# Patient Record
Sex: FEMALE | Race: WHITE | Employment: PART TIME | URBAN - METROPOLITAN AREA
[De-identification: names, ages, dates, MRNs, and addresses within clinical notes are randomized per-mention and may not be internally consistent; named-entity substitution may affect disease eponyms.]

---

## 2018-04-20 DIAGNOSIS — I10 ESSENTIAL HYPERTENSION: ICD-10-CM

## 2018-04-20 DIAGNOSIS — R07.9 CHEST PAIN, UNSPECIFIED TYPE: ICD-10-CM

## 2018-04-20 RX ORDER — VERAPAMIL HYDROCHLORIDE 120 MG/1
120 CAPSULE, EXTENDED RELEASE ORAL NIGHTLY
Qty: 90 CAPSULE | Refills: 0 | Status: SHIPPED | OUTPATIENT
Start: 2018-04-20 | End: 2018-05-23 | Stop reason: SDUPTHER

## 2018-05-14 ENCOUNTER — HOSPITAL ENCOUNTER (EMERGENCY)
Age: 48
Discharge: HOME | End: 2018-05-14
Payer: COMMERCIAL

## 2018-05-14 VITALS
WEIGHT: 225 LBS | DIASTOLIC BLOOD PRESSURE: 82 MMHG | SYSTOLIC BLOOD PRESSURE: 142 MMHG | TEMPERATURE: 97.9 F | OXYGEN SATURATION: 97 % | HEART RATE: 59 BPM | RESPIRATION RATE: 18 BRPM | BODY MASS INDEX: 30.52 KG/M2

## 2018-05-14 DIAGNOSIS — T78.40XA ALLERGIC REACTION, INITIAL ENCOUNTER: Primary | ICD-10-CM

## 2018-05-14 PROCEDURE — 96372 THER/PROPH/DIAG INJ SC/IM: CPT

## 2018-05-14 PROCEDURE — 99212 OFFICE O/P EST SF 10 MIN: CPT

## 2018-05-14 PROCEDURE — 6370000000 HC RX 637 (ALT 250 FOR IP): Performed by: NURSE PRACTITIONER

## 2018-05-14 PROCEDURE — 6360000002 HC RX W HCPCS: Performed by: NURSE PRACTITIONER

## 2018-05-14 RX ORDER — METHYLPREDNISOLONE SODIUM SUCCINATE 125 MG/2ML
125 INJECTION, POWDER, LYOPHILIZED, FOR SOLUTION INTRAMUSCULAR; INTRAVENOUS ONCE
Status: COMPLETED | OUTPATIENT
Start: 2018-05-14 | End: 2018-05-14

## 2018-05-14 RX ORDER — METHYLPREDNISOLONE 4 MG/1
TABLET ORAL
Qty: 21 TABLET | Status: SHIPPED | OUTPATIENT
Start: 2018-05-14 | End: 2018-05-20

## 2018-05-14 RX ORDER — DIPHENHYDRAMINE HCL 25 MG
25 TABLET ORAL ONCE
Status: COMPLETED | OUTPATIENT
Start: 2018-05-14 | End: 2018-05-14

## 2018-05-14 RX ORDER — FAMOTIDINE 20 MG/1
20 TABLET, FILM COATED ORAL ONCE
Status: COMPLETED | OUTPATIENT
Start: 2018-05-14 | End: 2018-05-14

## 2018-05-14 RX ADMIN — METHYLPREDNISOLONE SODIUM SUCCINATE 125 MG: 125 INJECTION, POWDER, FOR SOLUTION INTRAMUSCULAR; INTRAVENOUS at 09:17

## 2018-05-14 RX ADMIN — FAMOTIDINE 20 MG: 20 TABLET, FILM COATED ORAL at 09:17

## 2018-05-14 RX ADMIN — DIPHENHYDRAMINE HCL 25 MG: 25 TABLET ORAL at 09:16

## 2018-05-17 ENCOUNTER — CARE COORDINATION (OUTPATIENT)
Dept: CARE COORDINATION | Age: 48
End: 2018-05-17

## 2018-05-23 ENCOUNTER — OFFICE VISIT (OUTPATIENT)
Dept: FAMILY MEDICINE CLINIC | Age: 48
End: 2018-05-23
Payer: COMMERCIAL

## 2018-05-23 VITALS
DIASTOLIC BLOOD PRESSURE: 88 MMHG | OXYGEN SATURATION: 98 % | HEIGHT: 72 IN | BODY MASS INDEX: 31.71 KG/M2 | RESPIRATION RATE: 16 BRPM | TEMPERATURE: 98.2 F | SYSTOLIC BLOOD PRESSURE: 130 MMHG | HEART RATE: 55 BPM | WEIGHT: 234.12 LBS

## 2018-05-23 DIAGNOSIS — Z11.4 ENCOUNTER FOR SCREENING FOR HIV: ICD-10-CM

## 2018-05-23 DIAGNOSIS — Z00.00 HEALTH CARE MAINTENANCE: ICD-10-CM

## 2018-05-23 DIAGNOSIS — I10 HTN (HYPERTENSION), BENIGN: Primary | Chronic | ICD-10-CM

## 2018-05-23 DIAGNOSIS — I10 ESSENTIAL HYPERTENSION: ICD-10-CM

## 2018-05-23 DIAGNOSIS — T78.40XD ALLERGIC REACTION TO DRUG, SUBSEQUENT ENCOUNTER: ICD-10-CM

## 2018-05-23 PROCEDURE — 99214 OFFICE O/P EST MOD 30 MIN: CPT | Performed by: FAMILY MEDICINE

## 2018-05-23 RX ORDER — VERAPAMIL HYDROCHLORIDE 120 MG/1
120 CAPSULE, EXTENDED RELEASE ORAL NIGHTLY
Qty: 90 CAPSULE | Refills: 1 | Status: SHIPPED | OUTPATIENT
Start: 2018-07-15 | End: 2018-08-29 | Stop reason: ALTCHOICE

## 2018-05-23 RX ORDER — TRIAMTERENE AND HYDROCHLOROTHIAZIDE 37.5; 25 MG/1; MG/1
1 TABLET ORAL DAILY
Qty: 90 TABLET | Refills: 1 | Status: SHIPPED | OUTPATIENT
Start: 2018-05-23 | End: 2018-08-29 | Stop reason: SDUPTHER

## 2018-05-23 ASSESSMENT — PATIENT HEALTH QUESTIONNAIRE - PHQ9
1. LITTLE INTEREST OR PLEASURE IN DOING THINGS: 0
SUM OF ALL RESPONSES TO PHQ9 QUESTIONS 1 & 2: 0
2. FEELING DOWN, DEPRESSED OR HOPELESS: 0
SUM OF ALL RESPONSES TO PHQ QUESTIONS 1-9: 0

## 2018-07-30 DIAGNOSIS — F40.01 FEAR OF TRAVEL WITH PANIC ATTACKS: Primary | ICD-10-CM

## 2018-07-30 RX ORDER — HYDROXYZINE 50 MG/1
50 TABLET, FILM COATED ORAL DAILY PRN
Qty: 5 TABLET | Refills: 0 | Status: SHIPPED | OUTPATIENT
Start: 2018-07-30 | End: 2018-08-04

## 2018-08-22 ENCOUNTER — NURSE ONLY (OUTPATIENT)
Dept: FAMILY MEDICINE CLINIC | Age: 48
End: 2018-08-22
Payer: COMMERCIAL

## 2018-08-22 ENCOUNTER — HOSPITAL ENCOUNTER (OUTPATIENT)
Age: 48
Discharge: HOME | End: 2018-08-24
Payer: COMMERCIAL

## 2018-08-22 DIAGNOSIS — I10 HTN (HYPERTENSION), BENIGN: Chronic | ICD-10-CM

## 2018-08-22 DIAGNOSIS — Z11.4 ENCOUNTER FOR SCREENING FOR HIV: ICD-10-CM

## 2018-08-22 DIAGNOSIS — Z00.00 HEALTH CARE MAINTENANCE: ICD-10-CM

## 2018-08-22 LAB
ALBUMIN SERPL-MCNC: 4.2 G/DL (ref 3.5–5.2)
ALP BLD-CCNC: 49 U/L (ref 35–104)
ALT SERPL-CCNC: 17 U/L (ref 0–32)
ANION GAP SERPL CALCULATED.3IONS-SCNC: 12 MMOL/L (ref 7–16)
AST SERPL-CCNC: 20 U/L (ref 0–31)
BILIRUB SERPL-MCNC: 0.4 MG/DL (ref 0–1.2)
BUN BLDV-MCNC: 17 MG/DL (ref 6–20)
CALCIUM SERPL-MCNC: 9.4 MG/DL (ref 8.6–10.2)
CHLORIDE BLD-SCNC: 101 MMOL/L (ref 98–107)
CHOLESTEROL, TOTAL: 203 MG/DL (ref 0–199)
CO2: 28 MMOL/L (ref 22–29)
CREAT SERPL-MCNC: 1.1 MG/DL (ref 0.5–1)
GFR AFRICAN AMERICAN: >60
GFR NON-AFRICAN AMERICAN: 53 ML/MIN/1.73
GLUCOSE BLD-MCNC: 99 MG/DL (ref 74–109)
HDLC SERPL-MCNC: 47 MG/DL
LDL CHOLESTEROL CALCULATED: 122 MG/DL (ref 0–99)
MAGNESIUM: 2.3 MG/DL (ref 1.6–2.6)
POTASSIUM SERPL-SCNC: 4 MMOL/L (ref 3.5–5)
SODIUM BLD-SCNC: 141 MMOL/L (ref 132–146)
TOTAL PROTEIN: 7.1 G/DL (ref 6.4–8.3)
TRIGL SERPL-MCNC: 171 MG/DL (ref 0–149)
TSH SERPL DL<=0.05 MIU/L-ACNC: 1.45 UIU/ML (ref 0.27–4.2)
VLDLC SERPL CALC-MCNC: 34 MG/DL

## 2018-08-22 PROCEDURE — 86703 HIV-1/HIV-2 1 RESULT ANTBDY: CPT

## 2018-08-22 PROCEDURE — 36415 COLL VENOUS BLD VENIPUNCTURE: CPT | Performed by: FAMILY MEDICINE

## 2018-08-22 PROCEDURE — 80061 LIPID PANEL: CPT

## 2018-08-22 PROCEDURE — 80053 COMPREHEN METABOLIC PANEL: CPT

## 2018-08-22 PROCEDURE — 83735 ASSAY OF MAGNESIUM: CPT

## 2018-08-22 PROCEDURE — 84443 ASSAY THYROID STIM HORMONE: CPT

## 2018-08-23 LAB — HIV-1 AND HIV-2 ANTIBODIES: NORMAL

## 2018-08-29 ENCOUNTER — OFFICE VISIT (OUTPATIENT)
Dept: FAMILY MEDICINE CLINIC | Age: 48
End: 2018-08-29
Payer: COMMERCIAL

## 2018-08-29 VITALS
WEIGHT: 233.5 LBS | BODY MASS INDEX: 31.63 KG/M2 | DIASTOLIC BLOOD PRESSURE: 80 MMHG | SYSTOLIC BLOOD PRESSURE: 130 MMHG | HEART RATE: 58 BPM | TEMPERATURE: 97.7 F | HEIGHT: 72 IN | OXYGEN SATURATION: 98 % | RESPIRATION RATE: 12 BRPM

## 2018-08-29 DIAGNOSIS — Z12.11 COLON CANCER SCREENING: ICD-10-CM

## 2018-08-29 DIAGNOSIS — I10 ESSENTIAL HYPERTENSION: Primary | ICD-10-CM

## 2018-08-29 DIAGNOSIS — Z12.39 BREAST CANCER SCREENING: ICD-10-CM

## 2018-08-29 DIAGNOSIS — Z00.00 HEALTH CARE MAINTENANCE: ICD-10-CM

## 2018-08-29 PROCEDURE — 99214 OFFICE O/P EST MOD 30 MIN: CPT | Performed by: FAMILY MEDICINE

## 2018-08-29 RX ORDER — TRIAMTERENE AND HYDROCHLOROTHIAZIDE 37.5; 25 MG/1; MG/1
1 TABLET ORAL DAILY
Qty: 90 TABLET | Refills: 1 | Status: SHIPPED | OUTPATIENT
Start: 2018-08-29 | End: 2018-10-17 | Stop reason: SDUPTHER

## 2018-08-29 RX ORDER — VERAPAMIL HYDROCHLORIDE 120 MG/1
120 CAPSULE, EXTENDED RELEASE ORAL NIGHTLY
Qty: 90 CAPSULE | Refills: 1 | Status: CANCELLED | OUTPATIENT
Start: 2018-08-29 | End: 2019-03-01

## 2018-08-29 RX ORDER — METOPROLOL SUCCINATE 50 MG/1
50 TABLET, EXTENDED RELEASE ORAL DAILY
Qty: 90 TABLET | Refills: 1 | Status: SHIPPED | OUTPATIENT
Start: 2018-08-29 | End: 2018-10-17 | Stop reason: SDUPTHER

## 2018-08-29 ASSESSMENT — ENCOUNTER SYMPTOMS
BLURRED VISION: 0
BLOOD IN STOOL: 0
DIARRHEA: 0
BACK PAIN: 0
DOUBLE VISION: 0
WHEEZING: 0
CONSTIPATION: 0
NAUSEA: 0
COUGH: 0
SORE THROAT: 0
HEARTBURN: 0
SHORTNESS OF BREATH: 0
ORTHOPNEA: 0
ABDOMINAL PAIN: 0
VOMITING: 0
SPUTUM PRODUCTION: 0

## 2018-08-29 NOTE — PROGRESS NOTES
Alexander Pereira is a 53 y.o. female who presents today for check up. She has a history of HTN. Hypertension:  Patient is here for follow up chronic hypertension. This is not generally controlled on current medication regimen (currently on Maxzide 25 1 1/2 tablets/day and ER verapamil 120 mg/day). BP today is 130/80. Takes meds as directed and tolerates them well. Most recent labs reviewed with patient and are remarkable for GFR 53 and mildly elevated lipid profile. Symptoms from htn standpoint per ROS include lower extremity edema, palpitations. Patient is compliant with lifestyle modifications. She started Weight Watchers within the past 4 weeks and is eating less processed, less salty foods and more fruits and vegetables. Weight is stable. Patient does not smoke. Comorbid conditions include stress/anxiety without medication treatment. Health Maintenance:  Alexander Pereira is due for breast cancer and colon cancer screening. No PMH/FH of breast or female reproductive malignancies. Her mother had precancerous colon polyps. [address]:  Patient's past medical, surgical, social and/or family history reviewed, updated in chart, and are non-contributory (unless otherwise stated). Medications and allergies also reviewed and updated in chart. Review of Systems  Review of Systems   Constitutional: Negative for malaise/fatigue and weight loss. HENT: Negative for congestion, ear pain and sore throat. Eyes: Negative for blurred vision and double vision. Respiratory: Negative for cough, sputum production, shortness of breath and wheezing. Cardiovascular: Negative for chest pain, palpitations, orthopnea and leg swelling. Gastrointestinal: Negative for abdominal pain, blood in stool, constipation, diarrhea, heartburn, nausea and vomiting. Genitourinary: Negative for dysuria, frequency, hematuria and urgency.    Musculoskeletal: Negative for back pain, joint pain, myalgias and neck Pop Lindsey MD  -     [facility] DIGITAL SCREEN W CAD BILATERAL; Future    Breast cancer screening  -     [facility] DIGITAL SCREEN W CAD BILATERAL; Future    Colon cancer screening  -     Pop Lindsey MD         Call or go to ED immediately if symptoms worsen or persist.    Follow Up:  Return 1) F/U 6 weeks to assess BP on new medication regimen, for F/U 3 mos check up; fasting labs 1 week prior. or sooner if necessary. Educational materials and/or home exercises printed for patient's review and were included in patient instructions on his/her After Visit Summary and given to patient at the end of visit. Counseled regarding above diagnosis, including possible risks and complications,  especially if left uncontrolled. Counseled regarding the possible side effects, risks, benefits and alternatives to treatment; patient and/or guardian verbalizes understanding, agrees, feels comfortable with and wishes to proceed with above treatment plan. Advised patient to call with any new medication issues, and read all Rx info from pharmacy to assure aware of all possible risks and side effects of medication before taking. Reviewed age and gender appropriate health screening exams and vaccinations. Advised patient regarding importance of keeping up with recommended health maintenance and to schedule as soon as possible if overdue, as this is important in assessing for undiagnosed pathology, especially cancer, as well as protecting against potentially harmful/life threatening disease. Patient and/or guardian verbalizes understanding and agrees with above counseling, assessment and plan. All questions answered.     Mckenzie León MD

## 2018-08-30 ENCOUNTER — TELEPHONE (OUTPATIENT)
Dept: SURGERY | Age: 48
End: 2018-08-30

## 2018-08-30 NOTE — TELEPHONE ENCOUNTER
First attempt, Left message to schedule pt with any available provider at [place] office for a colonoscopy screening consult.     Electronically signed by Priya Cordero CMA on 8/30/18 at 10:54 AM

## 2018-09-04 ENCOUNTER — TELEPHONE (OUTPATIENT)
Dept: SURGERY | Age: 48
End: 2018-09-04

## 2018-09-10 ENCOUNTER — HOSPITAL ENCOUNTER (OUTPATIENT)
Dept: GENERAL RADIOLOGY | Age: 48
Discharge: HOME | End: 2018-09-12
Payer: COMMERCIAL

## 2018-09-10 DIAGNOSIS — Z12.39 BREAST CANCER SCREENING: ICD-10-CM

## 2018-09-10 DIAGNOSIS — Z00.00 HEALTH CARE MAINTENANCE: ICD-10-CM

## 2018-09-10 DIAGNOSIS — N64.4 BREAST PAIN, LEFT: ICD-10-CM

## 2018-09-10 PROCEDURE — 76642 ULTRASOUND BREAST LIMITED: CPT

## 2018-09-10 PROCEDURE — G0279 TOMOSYNTHESIS, MAMMO: HCPCS

## 2018-09-28 PROBLEM — Z00.00 HEALTH CARE MAINTENANCE: Status: RESOLVED | Noted: 2018-08-29 | Resolved: 2018-09-28

## 2018-10-09 ENCOUNTER — OFFICE VISIT (OUTPATIENT)
Dept: SURGERY | Age: 48
End: 2018-10-09
Payer: COMMERCIAL

## 2018-10-09 VITALS
BODY MASS INDEX: 31.97 KG/M2 | OXYGEN SATURATION: 97 % | WEIGHT: 236 LBS | HEART RATE: 55 BPM | HEIGHT: 72 IN | DIASTOLIC BLOOD PRESSURE: 85 MMHG | SYSTOLIC BLOOD PRESSURE: 133 MMHG | RESPIRATION RATE: 17 BRPM

## 2018-10-09 DIAGNOSIS — R10.31 RIGHT LOWER QUADRANT ABDOMINAL PAIN: Primary | ICD-10-CM

## 2018-10-09 PROCEDURE — 99999 PR OFFICE/OUTPT VISIT,PROCEDURE ONLY: CPT | Performed by: SURGERY

## 2018-10-09 PROCEDURE — 99202 OFFICE O/P NEW SF 15 MIN: CPT | Performed by: SURGERY

## 2018-10-09 RX ORDER — POLYETHYLENE GLYCOL 3350 17 G/17G
500 POWDER, FOR SOLUTION ORAL ONCE
Qty: 500 G | Refills: 1 | Status: SHIPPED | OUTPATIENT
Start: 2018-10-09 | End: 2018-10-09

## 2018-10-09 ASSESSMENT — ENCOUNTER SYMPTOMS
EYES NEGATIVE: 1
RESPIRATORY NEGATIVE: 1
HEARTBURN: 0
NAUSEA: 0
BLOOD IN STOOL: 0
CONSTIPATION: 0
VOMITING: 0
ABDOMINAL PAIN: 1
DIARRHEA: 0

## 2018-10-15 NOTE — PROGRESS NOTES
SOFI Valles called and spoke to Sigifredo pulido and scheduled PT for colonoscopy on 12/03/18 @ 7:30am with Dr. Cliff Farias. PT denied taking any ASA products. PT verbalized she understood prep instructions, and NPO after midnight. PT verbalized that she understood appointment date/time, as well as to arrive 1 hour prior to procedure. PT advised on where to park and enter the hospital at for procedure. PT has been told to make sure they have a ride to and from procedure as they are not allowed to drive after procedure. PT given procedure letter in office or mailed to them with all instructions also on it. MA instructed PT to call the office at [phone] with any questions, comments, or concerns about the procedure.        Electronically signed by Sheldon Davis MA on 10/15/18 at 10:36 AM

## 2018-10-17 ENCOUNTER — TELEPHONE (OUTPATIENT)
Dept: SURGERY | Age: 48
End: 2018-10-17

## 2018-10-17 ENCOUNTER — OFFICE VISIT (OUTPATIENT)
Dept: FAMILY MEDICINE CLINIC | Age: 48
End: 2018-10-17
Payer: COMMERCIAL

## 2018-10-17 VITALS
HEIGHT: 72 IN | DIASTOLIC BLOOD PRESSURE: 62 MMHG | SYSTOLIC BLOOD PRESSURE: 114 MMHG | OXYGEN SATURATION: 99 % | HEART RATE: 53 BPM | WEIGHT: 236 LBS | TEMPERATURE: 98.5 F | BODY MASS INDEX: 31.97 KG/M2

## 2018-10-17 DIAGNOSIS — I10 ESSENTIAL HYPERTENSION: Primary | ICD-10-CM

## 2018-10-17 DIAGNOSIS — Z00.00 HEALTH CARE MAINTENANCE: ICD-10-CM

## 2018-10-17 DIAGNOSIS — Z23 NEED FOR INFLUENZA VACCINATION: ICD-10-CM

## 2018-10-17 PROCEDURE — 99213 OFFICE O/P EST LOW 20 MIN: CPT | Performed by: FAMILY MEDICINE

## 2018-10-17 PROCEDURE — 90471 IMMUNIZATION ADMIN: CPT | Performed by: FAMILY MEDICINE

## 2018-10-17 PROCEDURE — 90686 IIV4 VACC NO PRSV 0.5 ML IM: CPT | Performed by: FAMILY MEDICINE

## 2018-10-17 RX ORDER — TRIAMTERENE AND HYDROCHLOROTHIAZIDE 37.5; 25 MG/1; MG/1
1 TABLET ORAL DAILY
Qty: 90 TABLET | Refills: 1 | Status: SHIPPED | OUTPATIENT
Start: 2018-10-17 | End: 2018-11-12 | Stop reason: SDUPTHER

## 2018-10-17 RX ORDER — METOPROLOL SUCCINATE 50 MG/1
50 TABLET, EXTENDED RELEASE ORAL DAILY
Qty: 90 TABLET | Refills: 1 | Status: SHIPPED | OUTPATIENT
Start: 2018-10-17 | End: 2018-11-28 | Stop reason: SDUPTHER

## 2018-10-17 ASSESSMENT — ENCOUNTER SYMPTOMS
SORE THROAT: 0
DIARRHEA: 0
DOUBLE VISION: 0
ORTHOPNEA: 0
ABDOMINAL PAIN: 0
NAUSEA: 0
HEARTBURN: 0
BLOOD IN STOOL: 0
BLURRED VISION: 0
COUGH: 0
BACK PAIN: 0
CONSTIPATION: 0
SPUTUM PRODUCTION: 0
VOMITING: 0
SHORTNESS OF BREATH: 0
WHEEZING: 0

## 2018-10-17 NOTE — PROGRESS NOTES
pain, joint pain, myalgias and neck pain. Skin: Negative for itching and rash. Neurological: Negative for dizziness, tingling, focal weakness, weakness and headaches. Psychiatric/Behavioral: Negative for depression, memory loss and substance abuse. The patient is not nervous/anxious and does not have insomnia. Physical Exam:    VS:  /62   Pulse 53   Temp 98.5 °F (36.9 °C) (Oral)   Ht 6' (1.829 m)   Wt 236 lb (107 kg)   SpO2 99%   Breastfeeding? No   BMI 32.01 kg/m²   LAST WEIGHT:  Wt Readings from Last 3 Encounters:   10/17/18 236 lb (107 kg)   10/09/18 236 lb (107 kg)   08/29/18 233 lb 8 oz (105.9 kg)     Physical Exam   Constitutional: She is oriented to person, place, and time. She appears well-developed and well-nourished. No distress. HENT:   Head: Normocephalic and atraumatic. Right Ear: External ear normal.   Left Ear: External ear normal.   Mouth/Throat: Oropharynx is clear and moist. No oropharyngeal exudate. Eyes: Pupils are equal, round, and reactive to light. Conjunctivae and EOM are normal. Right eye exhibits no discharge. No scleral icterus. Neck: Normal range of motion. Neck supple. No thyromegaly present. Cardiovascular: Normal rate, regular rhythm, normal heart sounds and intact distal pulses. No murmur heard. Pulmonary/Chest: Effort normal. No stridor. No respiratory distress. She has no wheezes. She has no rales. She exhibits no tenderness. Abdominal: Soft. Bowel sounds are normal. She exhibits no distension and no mass. There is no tenderness. There is no guarding. Musculoskeletal: Normal range of motion. She exhibits no edema or tenderness. Lymphadenopathy:     She has no cervical adenopathy. Neurological: She is alert and oriented to person, place, and time. Skin: Skin is warm and dry. No rash noted. She is not diaphoretic. No erythema. No pallor. Psychiatric: She has a normal mood and affect.  Her behavior is normal. Thought content normal. patient's review and were included in patient instructions on his/her After Visit Summary and given to patient at the end of visit. Counseled regarding above diagnosis, including possible risks and complications,  especially if left uncontrolled. Counseled regarding the possible side effects, risks, benefits and alternatives to treatment; patient and/or guardian verbalizes understanding, agrees, feels comfortable with and wishes to proceed with above treatment plan. Advised patient to call with any new medication issues, and read all Rx info from pharmacy to assure aware of all possible risks and side effects of medication before taking. Reviewed age and gender appropriate health screening exams and vaccinations. Advised patient regarding importance of keeping up with recommended health maintenance and to schedule as soon as possible if overdue, as this is important in assessing for undiagnosed pathology, especially cancer, as well as protecting against potentially harmful/life threatening disease. Patient and/or guardian verbalizes understanding and agrees with above counseling, assessment and plan. All questions answered.     Hari Scruggs MD

## 2018-11-12 DIAGNOSIS — I10 ESSENTIAL HYPERTENSION: ICD-10-CM

## 2018-11-12 RX ORDER — TRIAMTERENE AND HYDROCHLOROTHIAZIDE 37.5; 25 MG/1; MG/1
1 TABLET ORAL DAILY
Qty: 90 TABLET | Refills: 1 | Status: SHIPPED | OUTPATIENT
Start: 2018-11-12 | End: 2018-11-28 | Stop reason: SDUPTHER

## 2018-11-19 ENCOUNTER — NURSE ONLY (OUTPATIENT)
Dept: FAMILY MEDICINE CLINIC | Age: 48
End: 2018-11-19
Payer: COMMERCIAL

## 2018-11-19 ENCOUNTER — HOSPITAL ENCOUNTER (OUTPATIENT)
Age: 48
Discharge: HOME | End: 2018-11-21
Payer: COMMERCIAL

## 2018-11-19 DIAGNOSIS — I10 HTN (HYPERTENSION), BENIGN: Chronic | ICD-10-CM

## 2018-11-19 DIAGNOSIS — I10 ESSENTIAL HYPERTENSION: ICD-10-CM

## 2018-11-19 LAB
ALBUMIN SERPL-MCNC: 4.2 G/DL (ref 3.5–5.2)
ALP BLD-CCNC: 46 U/L (ref 35–104)
ALT SERPL-CCNC: 17 U/L (ref 0–32)
ANION GAP SERPL CALCULATED.3IONS-SCNC: 13 MMOL/L (ref 7–16)
AST SERPL-CCNC: 20 U/L (ref 0–31)
BASOPHILS ABSOLUTE: 0.06 E9/L (ref 0–0.2)
BASOPHILS RELATIVE PERCENT: 0.9 % (ref 0–2)
BILIRUB SERPL-MCNC: 0.5 MG/DL (ref 0–1.2)
BUN BLDV-MCNC: 15 MG/DL (ref 6–20)
CALCIUM SERPL-MCNC: 9.6 MG/DL (ref 8.6–10.2)
CHLORIDE BLD-SCNC: 103 MMOL/L (ref 98–107)
CHOLESTEROL, TOTAL: 198 MG/DL (ref 0–199)
CO2: 28 MMOL/L (ref 22–29)
CREAT SERPL-MCNC: 1.2 MG/DL (ref 0.5–1)
EOSINOPHILS ABSOLUTE: 0.23 E9/L (ref 0.05–0.5)
EOSINOPHILS RELATIVE PERCENT: 3.5 % (ref 0–6)
GFR AFRICAN AMERICAN: 58
GFR NON-AFRICAN AMERICAN: 48 ML/MIN/1.73
GLUCOSE BLD-MCNC: 103 MG/DL (ref 74–99)
HCT VFR BLD CALC: 42.8 % (ref 34–48)
HDLC SERPL-MCNC: 44 MG/DL
HEMOGLOBIN: 14.3 G/DL (ref 11.5–15.5)
IMMATURE GRANULOCYTES #: 0.02 E9/L
IMMATURE GRANULOCYTES %: 0.3 % (ref 0–5)
LDL CHOLESTEROL CALCULATED: 110 MG/DL (ref 0–99)
LYMPHOCYTES ABSOLUTE: 2.91 E9/L (ref 1.5–4)
LYMPHOCYTES RELATIVE PERCENT: 44 % (ref 20–42)
MAGNESIUM: 2.1 MG/DL (ref 1.6–2.6)
MCH RBC QN AUTO: 30.2 PG (ref 26–35)
MCHC RBC AUTO-ENTMCNC: 33.4 % (ref 32–34.5)
MCV RBC AUTO: 90.3 FL (ref 80–99.9)
MONOCYTES ABSOLUTE: 0.46 E9/L (ref 0.1–0.95)
MONOCYTES RELATIVE PERCENT: 6.9 % (ref 2–12)
NEUTROPHILS ABSOLUTE: 2.94 E9/L (ref 1.8–7.3)
NEUTROPHILS RELATIVE PERCENT: 44.4 % (ref 43–80)
PDW BLD-RTO: 12.1 FL (ref 11.5–15)
PLATELET # BLD: 298 E9/L (ref 130–450)
PMV BLD AUTO: 10.9 FL (ref 7–12)
POTASSIUM SERPL-SCNC: 4.3 MMOL/L (ref 3.5–5)
RBC # BLD: 4.74 E12/L (ref 3.5–5.5)
SODIUM BLD-SCNC: 144 MMOL/L (ref 132–146)
TOTAL PROTEIN: 6.9 G/DL (ref 6.4–8.3)
TRIGL SERPL-MCNC: 220 MG/DL (ref 0–149)
VLDLC SERPL CALC-MCNC: 44 MG/DL
WBC # BLD: 6.6 E9/L (ref 4.5–11.5)

## 2018-11-19 PROCEDURE — 80053 COMPREHEN METABOLIC PANEL: CPT

## 2018-11-19 PROCEDURE — 36415 COLL VENOUS BLD VENIPUNCTURE: CPT | Performed by: FAMILY MEDICINE

## 2018-11-19 PROCEDURE — 80061 LIPID PANEL: CPT

## 2018-11-19 PROCEDURE — 85025 COMPLETE CBC W/AUTO DIFF WBC: CPT

## 2018-11-19 PROCEDURE — 83735 ASSAY OF MAGNESIUM: CPT

## 2018-11-28 ENCOUNTER — OFFICE VISIT (OUTPATIENT)
Dept: FAMILY MEDICINE CLINIC | Age: 48
End: 2018-11-28
Payer: COMMERCIAL

## 2018-11-28 VITALS
OXYGEN SATURATION: 97 % | DIASTOLIC BLOOD PRESSURE: 86 MMHG | TEMPERATURE: 97.8 F | BODY MASS INDEX: 32.48 KG/M2 | SYSTOLIC BLOOD PRESSURE: 122 MMHG | HEIGHT: 72 IN | WEIGHT: 239.8 LBS | RESPIRATION RATE: 16 BRPM | HEART RATE: 51 BPM

## 2018-11-28 DIAGNOSIS — F32.A ANXIETY AND DEPRESSION: ICD-10-CM

## 2018-11-28 DIAGNOSIS — I10 ESSENTIAL HYPERTENSION: Primary | ICD-10-CM

## 2018-11-28 DIAGNOSIS — F41.9 ANXIETY AND DEPRESSION: ICD-10-CM

## 2018-11-28 PROCEDURE — G0444 DEPRESSION SCREEN ANNUAL: HCPCS | Performed by: FAMILY MEDICINE

## 2018-11-28 PROCEDURE — 99215 OFFICE O/P EST HI 40 MIN: CPT | Performed by: FAMILY MEDICINE

## 2018-11-28 RX ORDER — TRIAMTERENE AND HYDROCHLOROTHIAZIDE 37.5; 25 MG/1; MG/1
1 TABLET ORAL DAILY
Qty: 90 TABLET | Refills: 1
Start: 2018-11-28 | End: 2019-05-29 | Stop reason: SDUPTHER

## 2018-11-28 RX ORDER — METOPROLOL SUCCINATE 50 MG/1
50 TABLET, EXTENDED RELEASE ORAL DAILY
Qty: 90 TABLET | Refills: 1
Start: 2018-11-28 | End: 2019-02-27 | Stop reason: SDUPTHER

## 2018-11-28 RX ORDER — ESCITALOPRAM OXALATE 20 MG/1
20 TABLET ORAL DAILY
Qty: 90 TABLET | Refills: 1 | Status: SHIPPED | OUTPATIENT
Start: 2018-11-28 | End: 2019-03-05

## 2018-11-28 ASSESSMENT — ENCOUNTER SYMPTOMS
VOMITING: 0
NAUSEA: 0
DIARRHEA: 0
HEARTBURN: 0
SPUTUM PRODUCTION: 0
WHEEZING: 0
ABDOMINAL PAIN: 0
DOUBLE VISION: 0
BLOOD IN STOOL: 0
BACK PAIN: 0
CONSTIPATION: 0
BLURRED VISION: 0
SHORTNESS OF BREATH: 0
SORE THROAT: 0
COUGH: 0
ORTHOPNEA: 0

## 2018-11-28 ASSESSMENT — PATIENT HEALTH QUESTIONNAIRE - PHQ9
1. LITTLE INTEREST OR PLEASURE IN DOING THINGS: 3
4. FEELING TIRED OR HAVING LITTLE ENERGY: 2
9. THOUGHTS THAT YOU WOULD BE BETTER OFF DEAD, OR OF HURTING YOURSELF: 0
SUM OF ALL RESPONSES TO PHQ QUESTIONS 1-9: 12
5. POOR APPETITE OR OVEREATING: 0
10. IF YOU CHECKED OFF ANY PROBLEMS, HOW DIFFICULT HAVE THESE PROBLEMS MADE IT FOR YOU TO DO YOUR WORK, TAKE CARE OF THINGS AT HOME, OR GET ALONG WITH OTHER PEOPLE: 1
3. TROUBLE FALLING OR STAYING ASLEEP: 1
8. MOVING OR SPEAKING SO SLOWLY THAT OTHER PEOPLE COULD HAVE NOTICED. OR THE OPPOSITE, BEING SO FIGETY OR RESTLESS THAT YOU HAVE BEEN MOVING AROUND A LOT MORE THAN USUAL: 1
SUM OF ALL RESPONSES TO PHQ QUESTIONS 1-9: 12
SUM OF ALL RESPONSES TO PHQ9 QUESTIONS 1 & 2: 4
2. FEELING DOWN, DEPRESSED OR HOPELESS: 1
7. TROUBLE CONCENTRATING ON THINGS, SUCH AS READING THE NEWSPAPER OR WATCHING TELEVISION: 2
6. FEELING BAD ABOUT YOURSELF - OR THAT YOU ARE A FAILURE OR HAVE LET YOURSELF OR YOUR FAMILY DOWN: 2

## 2018-11-28 NOTE — PATIENT INSTRUCTIONS
Patient Education        Anxiety Disorder: Care Instructions  Your Care Instructions    Anxiety is a normal reaction to stress. Difficult situations can cause you to have symptoms such as sweaty palms and a nervous feeling. In an anxiety disorder, the symptoms are far more severe. Constant worry, muscle tension, trouble sleeping, nausea and diarrhea, and other symptoms can make normal daily activities difficult or impossible. These symptoms may occur for no reason, and they can affect your work, school, or social life. Medicines, counseling, and self-care can all help. Follow-up care is a key part of your treatment and safety. Be sure to make and go to all appointments, and call your doctor if you are having problems. It's also a good idea to know your test results and keep a list of the medicines you take. How can you care for yourself at home? · Take medicines exactly as directed. Call your doctor if you think you are having a problem with your medicine. · Go to your counseling sessions and follow-up appointments. · Recognize and accept your anxiety. Then, when you are in a situation that makes you anxious, say to yourself, \"This is not an emergency. I feel uncomfortable, but I am not in danger. I can keep going even if I feel anxious. \"  · Be kind to your body:  ? Relieve tension with exercise or a massage. ? Get enough rest.  ? Avoid alcohol, caffeine, nicotine, and illegal drugs. They can increase your anxiety level and cause sleep problems. ? Learn and do relaxation techniques. See below for more about these techniques. · Engage your mind. Get out and do something you enjoy. Go to a funny movie, or take a walk or hike. Plan your day. Having too much or too little to do can make you anxious. · Keep a record of your symptoms. Discuss your fears with a good friend or family member, or join a support group for people with similar problems. Talking to others sometimes relieves stress.   · Get involved in play a relaxation tape while you drive a car. When should you call for help? Call 911 anytime you think you may need emergency care. For example, call if:    · You feel you cannot stop from hurting yourself or someone else.   Gertrudis Enamorado the numbers for these national suicide hotlines: 5-338-325-TALK (4-278-300-958.641.1610) and 7-267-ZZALALI (9-238.676.8427). If you or someone you know talks about suicide or feeling hopeless, get help right away.   Watch closely for changes in your health, and be sure to contact your doctor if:    · You have anxiety or fear that affects your life.     · You have symptoms of anxiety that are new or different from those you had before. Where can you learn more? Go to https://[email or web address].[email or web address]. org and sign in to your [email or web address] account. Enter P754 in the [employer] box to learn more about \"Anxiety Disorder: Care Instructions. \"     If you do not have an account, please click on the \"Sign Up Now\" link. Current as of: December 7, 2017  Content Version: 11.8  © 2900-7557 Healthwise, [employer]. Care instructions adapted under license by [facility] ([facility]). If you have questions about a medical condition or this instruction, always ask your healthcare professional. [place] [address] any warranty or liability for your use of this information. Patient Education        Learning About Anxiety Disorders  What are anxiety disorders? Anxiety disorders are a type of medical problem. They cause severe anxiety. When you feel anxious, you feel that something bad is about to happen. This feeling interferes with your life. These disorders include:  · Generalized anxiety disorder. You feel worried and stressed about many everyday events and activities. This goes on for several months and disrupts your life on most days. · Panic disorder. You have repeated panic attacks. A panic attack is a sudden, intense fear or anxiety. It may make you feel short of breath.  Your heart may pound. · Social anxiety disorder. You feel very anxious about what you will say or do in front of people. For example, you may be scared to talk or eat in public. This problem affects your daily life. · Phobias. You are very scared of a specific object, situation, or activity. For example, you may fear spiders, high places, or small spaces. What are the symptoms? Generalized anxiety disorder  Symptoms may include:  · Feeling worried and stressed about many things almost every day. · Feeling tired or irritable. You may have a hard time concentrating. · Having headaches or muscle aches. · Having a hard time getting to sleep or staying asleep. Panic disorder  You may have repeated panic attacks when there is no reason for feeling afraid. You may change your daily activities because you worry that you will have another attack. Symptoms may include:  · Intense fear, terror, or anxiety. · Trouble breathing or very fast breathing. · Chest pain or tightness. · A heartbeat that races or is not regular. Social anxiety disorder  Symptoms may include:  · Fear about a social situation, such as eating in front of others or speaking in public. You may worry a lot. Or you may be afraid that something bad will happen. · Anxiety that can cause you to blush, sweat, and feel shaky. · A heartbeat that is faster than normal.  · A hard time focusing. Phobias  Symptoms may include:  · More fear than most people of being around an object, being in a situation, or doing an activity. You might also be stressed about the chance of being around the thing you fear. · Worry about losing control, panicking, fainting, or having physical symptoms like a faster heartbeat when you are around the situation or object. How are these disorders treated? Anxiety disorders can be treated with medicines or counseling. A combination of both may be used. Medicines may include:  · Antidepressants.  These may help your symptoms by does not mean that you are going crazy. It is important to know that depression can be treated. Medicines, counseling, and self-care can all help. Many people do not get help because they are embarrassed or think that they will get over the depression on their own. But some people do not get better without treatment. Follow-up care is a key part of your treatment and safety. Be sure to make and go to all appointments, and call your doctor if you are having problems. It's also a good idea to know your test results and keep a list of the medicines you take. How can you care for yourself at home? Learn about antidepressant medicines  Antidepressant medicines can improve or end the symptoms of depression. You may need to take the medicine for at least 6 months, and often longer. Keep taking your medicine even if you feel better. If you stop taking it too soon, your symptoms may come back or get worse. You may start to feel better within 1 to 3 weeks of taking antidepressant medicine. But it can take as many as 6 to 8 weeks to see more improvement. Talk to your doctor if you have problems with your medicine or if you do not notice any improvement after 3 weeks. Antidepressants can make you feel tired, dizzy, or nervous. Some people have dry mouth, constipation, headaches, sexual problems, an upset stomach, or diarrhea. Many of these side effects are mild and go away on their own after you take the medicine for a few weeks. Some may last longer. Talk to your doctor if side effects bother you too much. You might be able to try a different medicine. If you are pregnant or breastfeeding, talk to your doctor about what medicines you can take. Learn about counseling  In many cases, counseling can work as well as medicines to treat mild to moderate depression. Counseling is done by licensed mental health providers, such as psychologists, social workers, and some types of nurses.  It can be done in one-on-one sessions or

## 2018-11-28 NOTE — PROGRESS NOTES
Component Value Date    LDLCALC 110 (H) 11/19/2018    LDLCALC 122 (H) 08/22/2018    LDLCALC 102 (H) 11/03/2015       No results found for: LABA1C  Lab Results   Component Value Date    LDLCALC 110 (H) 11/19/2018    CREATININE 1.2 (H) 11/19/2018         Assessment / Plan:      Giselle Hernandez was seen today for hypertension and atrial flutter. Diagnoses and all orders for this visit:    Essential hypertension:  Well controlled  -     triamterene-hydrochlorothiazide (MAXZIDE-25) 37.5-25 MG per tablet; Take 1 tablet by mouth daily  -     metoprolol succinate (TOPROL XL) 50 MG extended release tablet; Take 1 tablet by mouth daily    Anxiety and depression:  New diagnosis  -     escitalopram (LEXAPRO) 20 MG tablet; Take 1 tablet by mouth daily        -     Written/video information regarding anxiety and depression emailed to patient via [address] She was encouraged to start walking daily. Call or go to ED immediately if symptoms worsen or persist.    Follow Up:  Return for F/U 6-7 weeks to assess response of symptoms to medication and non medication rx. or sooner if necessary. Educational materials and/or home exercises printed for patient's review and were included in patient instructions on his/her After Visit Summary and given to patient at the end of visit. Counseled regarding above diagnosis, including possible risks and complications,  especially if left uncontrolled. Counseled regarding the possible side effects, risks, benefits and alternatives to treatment; patient and/or guardian verbalizes understanding, agrees, feels comfortable with and wishes to proceed with above treatment plan. Advised patient to call with any new medication issues, and read all Rx info from pharmacy to assure aware of all possible risks and side effects of medication before taking. Reviewed age and gender appropriate health screening exams and vaccinations.   Advised patient regarding importance of keeping up

## 2018-11-28 NOTE — PROGRESS NOTES
remainder of the day due to having had anesthesia. PARKING INSTRUCTIONS:   [x] Arrival [id number]  · [x] Parking lot 1 is located on [facility] (the corner of [facility] and [facility]). To enter, press the button and the gate will lift. A free token will be provided to exit the lot. One car per patient is allowed to park in this lot. All other cars are to park on [address] [place] [address] Street either in the parking garage or the handicap lot. [] Free  parking is available on [address] [place] [address]. · [] To reach the [facility] lobby from [address] [place] [address], upon entering the hospital, take elevator B to the 3rd floor. EDUCATION INSTRUCTIONS:      [] Knee or hip replacement booklet & exercise pamphlets given. [] Ge [address] placed in chart. [] Pre-admission Testing educational folder given  [] Incentive Spirometry,coughing & deep breathing exercises reviewed. []Medication information sheet(s)   []Fluoroscopy-Xray used in surgery reviewed with patient. Educational pamphlet placed in chart. []Pain: Post-op pain is normal and to be expected. You will be asked to rate your pain from 0-10(a zero is not acceptable-education is needed). Your post-op pain goal is:  [] Ask your nurse for your pain medication. [] Joint camp offered. [] Joint replacement booklets given. []Other     MEDICATION INSTRUCTIONS:   [x]Bring a complete list of your medications, please write the last time you took the medicine, give this list to the nurse. [x] Take the following medications the morning of surgery with 1-2 ounces of water: see med list  [] Stop herbal supplements and vitamins 5 days before your surgery. [] DO NOT take any diabetic medicine the morning of surgery. Follow instructions for insulin the day before surgery. [] If you are diabetic and your blood sugar is low or you feel symptomatic, you may drink 1-2 ounces of apple juice or take a glucose tablet.   The morning of your procedure, you may call the pre-op area if you have concerns about your blood sugar [phone]. [] Use your inhalers the morning of surgery. Bring your emergency inhaler with you day of surgery. [] Follow physician instructions regarding any blood thinners you may be taking. WHAT TO EXPECT:  [x] The day of surgery you will be greeted and  checked in by the [employer] .  A nurse will greet you in accordance to the time you are needed in the pre-op area to prepare you for surgery. Please do not be discouraged if you are not greeted in the order you arrive as there are many variables that are involved in patient preparation. Your patience is greatly appreciated as you wait for your nurse. Please bring in items such as: books, magazines, newspapers, electronics, or any other items  to occupy your time in the waiting area. [x]  Delays may occur with surgery and staff will make a sincere effort to keep you informed of delays. If any delays occur with your procedure, we apologize ahead of time for your inconvenience as we recognize the value of your time.

## 2018-11-30 ENCOUNTER — ANESTHESIA EVENT (OUTPATIENT)
Dept: ENDOSCOPY | Age: 48
End: 2018-11-30
Payer: COMMERCIAL

## 2018-12-01 ENCOUNTER — PREP FOR PROCEDURE (OUTPATIENT)
Dept: SURGERY | Age: 48
End: 2018-12-01

## 2018-12-01 RX ORDER — 0.9 % SODIUM CHLORIDE 0.9 %
10 VIAL (ML) INJECTION EVERY 12 HOURS SCHEDULED
Status: CANCELLED | OUTPATIENT
Start: 2018-12-01

## 2018-12-01 RX ORDER — 0.9 % SODIUM CHLORIDE 0.9 %
10 VIAL (ML) INJECTION PRN
Status: CANCELLED | OUTPATIENT
Start: 2018-12-01

## 2018-12-01 RX ORDER — SODIUM CHLORIDE 9 MG/ML
INJECTION, SOLUTION INTRAVENOUS CONTINUOUS
Status: CANCELLED | OUTPATIENT
Start: 2018-12-01

## 2018-12-02 NOTE — H&P
[facility]  HISTORY & PHYSICAL  ATTENDING NOTE          Chief Complaint   Patient presents with    New Patient       new patient referral from Dr Arturo Mckinney for colonoscopy screening consult. patient states her mother has had colon polyps removed. no family history of colon cancer though. patient states she has pain in LRQ that she has had ultrasounds on but no findings and is continuing to have pain.  Other       patient has never had colonoscopy      HPI:  51y/o F presents for persistent RLQ pain since November. Nothing makes it worse of better. She denies constipation, diarrhea, N/V.  US of the pelvis was normal.  She denies melena, hematochezia. Her mother has a history of colon polyps, but she does not know much more than that.   She denies family history of colon cancer, Crohn's disease or colitis.     Past Medical History        Past Medical History:   Diagnosis Date    ADHD (attention deficit hyperactivity disorder)      Fear of travel with panic attacks      Hypertension      Right ankle injury 4/2015         Past Surgical History   Past Surgical History:   Procedure Laterality Date    CHOLECYSTECTOMY   2003    OVARIAN CYST SURGERY   1990         Family History         Family History   Problem Relation Age of Onset    High Blood Pressure Mother      Colon Polyps Mother      High Blood Pressure Father      Arrhythmia Father           A-fib; S/P ablation    ADHD Son      Alcohol Abuse Maternal Grandfather      Alcohol Abuse Paternal Grandfather                   Social History   Substance Use Topics    Smoking status: Former Smoker       Packs/day: 0.25       Types: Cigarettes       Quit date: 4/15/2011    Smokeless tobacco: Never Used    Alcohol use 3.0 - 4.2 oz/week        5 - 7 Glasses of wine per week          Comment: (11/3/15):  1 glass wine/daily            Allergies   Allergen Reactions    Ace Inhibitors Hives and Itching    Cat Hair Extract

## 2018-12-03 ENCOUNTER — HOSPITAL ENCOUNTER (OUTPATIENT)
Age: 48
Setting detail: OUTPATIENT SURGERY
Discharge: HOME | End: 2018-12-03
Attending: SURGERY | Admitting: SURGERY
Payer: COMMERCIAL

## 2018-12-03 ENCOUNTER — TELEPHONE (OUTPATIENT)
Dept: SURGERY | Age: 48
End: 2018-12-03

## 2018-12-03 ENCOUNTER — ANESTHESIA (OUTPATIENT)
Dept: ENDOSCOPY | Age: 48
End: 2018-12-03
Payer: COMMERCIAL

## 2018-12-03 VITALS
RESPIRATION RATE: 16 BRPM | TEMPERATURE: 97.3 F | DIASTOLIC BLOOD PRESSURE: 79 MMHG | HEIGHT: 72 IN | OXYGEN SATURATION: 97 % | BODY MASS INDEX: 32.37 KG/M2 | WEIGHT: 239 LBS | HEART RATE: 56 BPM | SYSTOLIC BLOOD PRESSURE: 128 MMHG

## 2018-12-03 VITALS
RESPIRATION RATE: 11 BRPM | SYSTOLIC BLOOD PRESSURE: 98 MMHG | OXYGEN SATURATION: 97 % | DIASTOLIC BLOOD PRESSURE: 54 MMHG

## 2018-12-03 PROCEDURE — 3700000000 HC ANESTHESIA ATTENDED CARE: Performed by: SURGERY

## 2018-12-03 PROCEDURE — 3609010300 HC COLONOSCOPY W/BIOPSY SINGLE/MULTIPLE: Performed by: SURGERY

## 2018-12-03 PROCEDURE — 88305 TISSUE EXAM BY PATHOLOGIST: CPT

## 2018-12-03 PROCEDURE — 6360000002 HC RX W HCPCS: Performed by: NURSE ANESTHETIST, CERTIFIED REGISTERED

## 2018-12-03 PROCEDURE — C1773 RET DEV, INSERTABLE: HCPCS | Performed by: SURGERY

## 2018-12-03 PROCEDURE — 3700000001 HC ADD 15 MINUTES (ANESTHESIA): Performed by: SURGERY

## 2018-12-03 PROCEDURE — 7100000011 HC PHASE II RECOVERY - ADDTL 15 MIN: Performed by: SURGERY

## 2018-12-03 PROCEDURE — 2709999900 HC NON-CHARGEABLE SUPPLY: Performed by: SURGERY

## 2018-12-03 PROCEDURE — 2580000003 HC RX 258: Performed by: SURGERY

## 2018-12-03 PROCEDURE — 45380 COLONOSCOPY AND BIOPSY: CPT | Performed by: SURGERY

## 2018-12-03 PROCEDURE — 7100000010 HC PHASE II RECOVERY - FIRST 15 MIN: Performed by: SURGERY

## 2018-12-03 RX ORDER — SODIUM CHLORIDE 9 MG/ML
INJECTION, SOLUTION INTRAVENOUS CONTINUOUS
Status: DISCONTINUED | OUTPATIENT
Start: 2018-12-03 | End: 2018-12-03 | Stop reason: HOSPADM

## 2018-12-03 RX ORDER — PROPOFOL 10 MG/ML
INJECTION, EMULSION INTRAVENOUS PRN
Status: DISCONTINUED | OUTPATIENT
Start: 2018-12-03 | End: 2018-12-03 | Stop reason: SDUPTHER

## 2018-12-03 RX ORDER — MIDAZOLAM HYDROCHLORIDE 1 MG/ML
INJECTION INTRAMUSCULAR; INTRAVENOUS PRN
Status: DISCONTINUED | OUTPATIENT
Start: 2018-12-03 | End: 2018-12-03 | Stop reason: SDUPTHER

## 2018-12-03 RX ORDER — 0.9 % SODIUM CHLORIDE 0.9 %
10 VIAL (ML) INJECTION EVERY 12 HOURS SCHEDULED
Status: DISCONTINUED | OUTPATIENT
Start: 2018-12-03 | End: 2018-12-03 | Stop reason: HOSPADM

## 2018-12-03 RX ORDER — 0.9 % SODIUM CHLORIDE 0.9 %
10 VIAL (ML) INJECTION PRN
Status: DISCONTINUED | OUTPATIENT
Start: 2018-12-03 | End: 2018-12-03 | Stop reason: HOSPADM

## 2018-12-03 RX ORDER — FENTANYL CITRATE 50 UG/ML
INJECTION, SOLUTION INTRAMUSCULAR; INTRAVENOUS PRN
Status: DISCONTINUED | OUTPATIENT
Start: 2018-12-03 | End: 2018-12-03 | Stop reason: SDUPTHER

## 2018-12-03 RX ADMIN — PROPOFOL 290 MG: 10 INJECTION, EMULSION INTRAVENOUS at 07:55

## 2018-12-03 RX ADMIN — SODIUM CHLORIDE: 9 INJECTION, SOLUTION INTRAVENOUS at 07:09

## 2018-12-03 RX ADMIN — MIDAZOLAM HYDROCHLORIDE 2 MG: 1 INJECTION, SOLUTION INTRAMUSCULAR; INTRAVENOUS at 07:35

## 2018-12-03 RX ADMIN — FENTANYL CITRATE 50 MCG: 50 INJECTION, SOLUTION INTRAMUSCULAR; INTRAVENOUS at 07:40

## 2018-12-03 ASSESSMENT — PAIN SCALES - GENERAL
PAINLEVEL_OUTOF10: 0

## 2018-12-03 ASSESSMENT — PAIN - FUNCTIONAL ASSESSMENT: PAIN_FUNCTIONAL_ASSESSMENT: 0-10

## 2018-12-07 NOTE — TELEPHONE ENCOUNTER
MA attempted to contact patient per  to set up follow up appt. MA reached patients VM and left detailed message of why was calling and office number for her to return call and schedule appointment. Letter from patient procedures mailed out also and request to call office and set up appt on there also.     Electronically signed by Jerrell Noble MA on 12/7/18 at 3:06 PM

## 2019-02-05 ENCOUNTER — HOSPITAL ENCOUNTER (EMERGENCY)
Age: 49
Discharge: NURSING, REHAB OR LONG-TERM CARE | End: 2019-02-05
Payer: COMMERCIAL

## 2019-02-05 VITALS
HEART RATE: 60 BPM | WEIGHT: 240 LBS | TEMPERATURE: 97.8 F | DIASTOLIC BLOOD PRESSURE: 80 MMHG | SYSTOLIC BLOOD PRESSURE: 161 MMHG | RESPIRATION RATE: 16 BRPM | OXYGEN SATURATION: 95 % | BODY MASS INDEX: 32.55 KG/M2
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
## 2019-02-05 PROCEDURE — 90471 IMMUNIZATION ADMIN: CPT | Performed by: PHYSICIAN ASSISTANT

## 2019-02-05 PROCEDURE — 6360000002 HC RX W HCPCS: Performed by: PHYSICIAN ASSISTANT

## 2019-02-05 PROCEDURE — 90715 TDAP VACCINE 7 YRS/> IM: CPT | Performed by: PHYSICIAN ASSISTANT

## 2019-02-05 PROCEDURE — 99213 OFFICE O/P EST LOW 20 MIN: CPT

## 2019-02-05 PROCEDURE — 6370000000 HC RX 637 (ALT 250 FOR IP): Performed by: PHYSICIAN ASSISTANT

## 2019-02-05 RX ORDER — AMOXICILLIN AND CLAVULANATE POTASSIUM 875; 125 MG/1; MG/1
1 TABLET, FILM COATED ORAL 2 TIMES DAILY
Qty: 20 TABLET | Refills: 0 | Status: SHIPPED | OUTPATIENT
Start: 2019-02-05 | End: 2019-02-15

## 2019-02-05 RX ORDER — DIAPER,BRIEF,INFANT-TODD,DISP
EACH MISCELLANEOUS ONCE
Status: COMPLETED | OUTPATIENT
Start: 2019-02-05 | End: 2019-02-05

## 2019-02-05 RX ADMIN — BACITRACIN ZINC 1 G: 500 OINTMENT TOPICAL at 10:10

## 2019-02-05 RX ADMIN — TETANUS TOXOID, REDUCED DIPHTHERIA TOXOID AND ACELLULAR PERTUSSIS VACCINE, ADSORBED 0.5 ML: 5; 2.5; 8; 8; 2.5 SUSPENSION INTRAMUSCULAR at 10:10

## 2019-02-08 ENCOUNTER — CARE COORDINATION (OUTPATIENT)
Dept: CARE COORDINATION | Age: 49
End: 2019-02-08

## 2019-02-27 DIAGNOSIS — I10 ESSENTIAL HYPERTENSION: ICD-10-CM

## 2019-02-27 RX ORDER — METOPROLOL SUCCINATE 50 MG/1
50 TABLET, EXTENDED RELEASE ORAL DAILY
Qty: 90 TABLET | Refills: 1 | Status: SHIPPED | OUTPATIENT
Start: 2019-02-27 | End: 2019-04-16

## 2019-03-05 ENCOUNTER — OFFICE VISIT (OUTPATIENT)
Dept: FAMILY MEDICINE CLINIC | Age: 49
End: 2019-03-05
Payer: COMMERCIAL

## 2019-03-05 VITALS
DIASTOLIC BLOOD PRESSURE: 80 MMHG | OXYGEN SATURATION: 97 % | HEART RATE: 66 BPM | BODY MASS INDEX: 33.59 KG/M2 | TEMPERATURE: 98.3 F | WEIGHT: 248 LBS | SYSTOLIC BLOOD PRESSURE: 118 MMHG | HEIGHT: 72 IN

## 2019-03-05 DIAGNOSIS — F32.A ANXIETY AND DEPRESSION: ICD-10-CM

## 2019-03-05 DIAGNOSIS — F41.9 ANXIETY AND DEPRESSION: ICD-10-CM

## 2019-03-05 PROCEDURE — 99213 OFFICE O/P EST LOW 20 MIN: CPT | Performed by: FAMILY MEDICINE

## 2019-03-05 RX ORDER — ESCITALOPRAM OXALATE 20 MG/1
10 TABLET ORAL DAILY
Qty: 90 TABLET | Refills: 1 | Status: SHIPPED | OUTPATIENT
Start: 2019-03-05 | End: 2019-04-16

## 2019-03-05 RX ORDER — BUPROPION HYDROCHLORIDE 150 MG/1
150 TABLET, EXTENDED RELEASE ORAL EVERY MORNING
Qty: 90 TABLET | Refills: 1 | Status: SHIPPED | OUTPATIENT
Start: 2019-03-05 | End: 2019-04-16

## 2019-03-05 ASSESSMENT — ENCOUNTER SYMPTOMS
DIARRHEA: 0
BLURRED VISION: 0
SORE THROAT: 0
COUGH: 0
NAUSEA: 0
DOUBLE VISION: 0
HEARTBURN: 0
ORTHOPNEA: 0
ABDOMINAL PAIN: 0
VOMITING: 0
SHORTNESS OF BREATH: 0
CONSTIPATION: 0
BACK PAIN: 0
BLOOD IN STOOL: 0
SPUTUM PRODUCTION: 0
WHEEZING: 0

## 2019-03-05 ASSESSMENT — PATIENT HEALTH QUESTIONNAIRE - PHQ9
SUM OF ALL RESPONSES TO PHQ QUESTIONS 1-9: 2
SUM OF ALL RESPONSES TO PHQ QUESTIONS 1-9: 2
2. FEELING DOWN, DEPRESSED OR HOPELESS: 1
1. LITTLE INTEREST OR PLEASURE IN DOING THINGS: 1
SUM OF ALL RESPONSES TO PHQ9 QUESTIONS 1 & 2: 2

## 2019-03-07 ENCOUNTER — TELEPHONE (OUTPATIENT)
Dept: FAMILY MEDICINE CLINIC | Age: 49
End: 2019-03-07

## 2019-04-16 ENCOUNTER — HOSPITAL ENCOUNTER (OUTPATIENT)
Age: 49
Discharge: HOME | End: 2019-04-18
Payer: COMMERCIAL

## 2019-04-16 ENCOUNTER — OFFICE VISIT (OUTPATIENT)
Dept: FAMILY MEDICINE CLINIC | Age: 49
End: 2019-04-16
Payer: COMMERCIAL

## 2019-04-16 VITALS
SYSTOLIC BLOOD PRESSURE: 110 MMHG | DIASTOLIC BLOOD PRESSURE: 80 MMHG | HEART RATE: 72 BPM | BODY MASS INDEX: 34.67 KG/M2 | WEIGHT: 256 LBS | TEMPERATURE: 98 F | RESPIRATION RATE: 16 BRPM | HEIGHT: 72 IN | OXYGEN SATURATION: 96 %

## 2019-04-16 DIAGNOSIS — F41.9 ANXIETY AND DEPRESSION: Primary | ICD-10-CM

## 2019-04-16 DIAGNOSIS — R63.5 WEIGHT GAIN: ICD-10-CM

## 2019-04-16 DIAGNOSIS — I10 ESSENTIAL HYPERTENSION: ICD-10-CM

## 2019-04-16 DIAGNOSIS — R53.83 FATIGUE, UNSPECIFIED TYPE: ICD-10-CM

## 2019-04-16 DIAGNOSIS — F32.A ANXIETY AND DEPRESSION: Primary | ICD-10-CM

## 2019-04-16 LAB — TSH SERPL DL<=0.05 MIU/L-ACNC: 0.75 UIU/ML (ref 0.27–4.2)

## 2019-04-16 PROCEDURE — 86800 THYROGLOBULIN ANTIBODY: CPT

## 2019-04-16 PROCEDURE — 86376 MICROSOMAL ANTIBODY EACH: CPT

## 2019-04-16 PROCEDURE — 84443 ASSAY THYROID STIM HORMONE: CPT

## 2019-04-16 PROCEDURE — 99214 OFFICE O/P EST MOD 30 MIN: CPT | Performed by: FAMILY MEDICINE

## 2019-04-16 RX ORDER — ESCITALOPRAM OXALATE 20 MG/1
10 TABLET ORAL DAILY
Qty: 90 TABLET | Refills: 1
Start: 2019-04-16 | End: 2019-05-29 | Stop reason: SDUPTHER

## 2019-04-16 RX ORDER — METOPROLOL SUCCINATE 50 MG/1
25 TABLET, EXTENDED RELEASE ORAL DAILY
Qty: 90 TABLET | Refills: 1 | Status: SHIPPED
Start: 2019-04-16 | End: 2020-04-20 | Stop reason: SDUPTHER

## 2019-04-16 ASSESSMENT — ENCOUNTER SYMPTOMS
ABDOMINAL PAIN: 0
SPUTUM PRODUCTION: 0
BLURRED VISION: 0
BACK PAIN: 0
WHEEZING: 0
DIARRHEA: 0
VOMITING: 0
SHORTNESS OF BREATH: 0
ORTHOPNEA: 0
NAUSEA: 0
CONSTIPATION: 0
COUGH: 0
HEARTBURN: 0
SORE THROAT: 0
BLOOD IN STOOL: 0
DOUBLE VISION: 0

## 2019-04-16 NOTE — PROGRESS NOTES
Dalton Luz is a 51 y.o. female who presents today for   Chief Complaint   Patient presents with    Follow-up     Assess medication regimen       Anxiety and depression:  Somatic symptoms of anxiety have resolved, but she has no energy and no libido. The latter may be a result of high dose escitalopram.  Discussed lowering dose of escitalopram and addition of bupropion that may increase energy as well as improve libido. Patient agreed to trial of this regimen  -     Decrease escitalopram (LEXAPRO) 20 MG tablet; Take 0.5 tablets by mouth daily  -     Add buPROPion (WELLBUTRIN SR) 150 MG extended release tablet; Take 1 tablet by mouth every morning  -     Patient encouraged to start incorporating lifestyle information discussed at length during initial office visit for this diagnosis    Compliance: taking as directed. Response: no change in the above symptoms; in fact, she feels as though her symptoms are worse, particularly the lack of motivation, fatigue, and weight gain. Chart reviewed; TSH done 8/2018 within desired limits. She sleeps well with the escitalopram and anxiety symptoms are resolved. She is postmenopausal x 2 years; she sees Dr. Herman Warner    Will repeat thyroid function ;labs. Discontinue bupropion, as it seems to have added no benefit for patient's symptoms    Further history following chart review:  patient was started on Metoprolol succinate 50 mg QHS around the time that patient started to complain of these symptoms      [address]:  Patient's past medical, surgical, social and/or family history reviewed, updated in chart, and are non-contributory (unless otherwise stated). Medications and allergies also reviewed and updated in chart. Review of Systems  Review of Systems   Constitutional: Negative for malaise/fatigue and weight loss. HENT: Negative for congestion, ear pain and sore throat. Eyes: Negative for blurred vision and double vision.    Respiratory: Negative for Decrease metoprolol succinate (TOPROL XL) 50 MG extended release tablet; Take 0.5 tablets by mouth daily    Fatigue, unspecified type:  Differential diagnosis may include thyroid dysfunction versus medication effect  -     Anti-Thyroglobulin Antibody; Future  -     Thyroid Peroxidase Antibody; Future  -     TSH without Reflex; Future    Weight gain:  Differential diagnosis may include thyroid dysfunction versus medication effect  -     Anti-Thyroglobulin Antibody; Future  -     Thyroid Peroxidase Antibody; Future  -     TSH without Reflex; Future        Call or go to ED immediately if symptoms worsen or persist.    Follow Up:  Return for F/U 4-6 weeks to assess response of symptoms and BP to lower Metoprolo dose. or sooner if necessary. Educational materials and/or home exercises printed for patient's review and were included in patient instructions on his/her After Visit Summary and given to patient at the end of visit. Counseled regarding above diagnosis, including possible risks and complications,  especially if left uncontrolled. Counseled regarding the possible side effects, risks, benefits and alternatives to treatment; patient and/or guardian verbalizes understanding, agrees, feels comfortable with and wishes to proceed with above treatment plan. Advised patient to call with any new medication issues, and read all Rx info from pharmacy to assure aware of all possible risks and side effects of medication before taking. Reviewed age and gender appropriate health screening exams and vaccinations. Advised patient regarding importance of keeping up with recommended health maintenance and to schedule as soon as possible if overdue, as this is important in assessing for undiagnosed pathology, especially cancer, as well as protecting against potentially harmful/life threatening disease.       Patient and/or guardian verbalizes understanding and agrees with above counseling, assessment and plan.    All questions answered.     Grzegorz Delatorre MD

## 2019-04-19 ENCOUNTER — TELEPHONE (OUTPATIENT)
Dept: FAMILY MEDICINE CLINIC | Age: 49
End: 2019-04-19

## 2019-04-19 LAB
THYROGLOBULIN AB: <0.9 IU/ML (ref 0–4)
THYROID PEROXIDASE (TPO) ABS: 0.6 IU/ML (ref 0–9)

## 2019-04-19 NOTE — TELEPHONE ENCOUNTER
1st attempt to contact pt for results, lm for pt to contact office.      Electronically signed by Steven Collins MA on 4/19/2019 at 10:10 AM

## 2019-05-29 ENCOUNTER — OFFICE VISIT (OUTPATIENT)
Dept: FAMILY MEDICINE CLINIC | Age: 49
End: 2019-05-29
Payer: COMMERCIAL

## 2019-05-29 VITALS
WEIGHT: 262 LBS | OXYGEN SATURATION: 96 % | HEIGHT: 72 IN | TEMPERATURE: 97.9 F | SYSTOLIC BLOOD PRESSURE: 126 MMHG | HEART RATE: 63 BPM | DIASTOLIC BLOOD PRESSURE: 88 MMHG | BODY MASS INDEX: 35.49 KG/M2

## 2019-05-29 DIAGNOSIS — I10 ESSENTIAL HYPERTENSION: ICD-10-CM

## 2019-05-29 DIAGNOSIS — F32.A ANXIETY AND DEPRESSION: Primary | ICD-10-CM

## 2019-05-29 DIAGNOSIS — F41.9 ANXIETY AND DEPRESSION: Primary | ICD-10-CM

## 2019-05-29 PROCEDURE — 99214 OFFICE O/P EST MOD 30 MIN: CPT | Performed by: FAMILY MEDICINE

## 2019-05-29 RX ORDER — TRIAMTERENE AND HYDROCHLOROTHIAZIDE 37.5; 25 MG/1; MG/1
1 TABLET ORAL DAILY
Qty: 90 TABLET | Refills: 3 | Status: SHIPPED
Start: 2019-05-29 | End: 2020-05-04 | Stop reason: SDUPTHER

## 2019-05-29 RX ORDER — TRAZODONE HYDROCHLORIDE 150 MG/1
150 TABLET ORAL NIGHTLY
Qty: 90 TABLET | Refills: 3 | Status: SHIPPED
Start: 2019-05-29 | End: 2020-09-10

## 2019-05-29 RX ORDER — ESCITALOPRAM OXALATE 20 MG/1
20 TABLET ORAL DAILY
Qty: 90 TABLET | Refills: 3 | Status: SHIPPED
Start: 2019-05-29 | End: 2020-05-04 | Stop reason: SDUPTHER

## 2019-05-29 ASSESSMENT — ENCOUNTER SYMPTOMS
ABDOMINAL PAIN: 0
VOMITING: 0
DOUBLE VISION: 0
BLURRED VISION: 0
NAUSEA: 0
SPUTUM PRODUCTION: 0
BLOOD IN STOOL: 0
SHORTNESS OF BREATH: 0
HEARTBURN: 0
COUGH: 0
ORTHOPNEA: 0
CONSTIPATION: 0
WHEEZING: 0
SORE THROAT: 0
DIARRHEA: 0
BACK PAIN: 0

## 2019-06-27 ENCOUNTER — APPOINTMENT (OUTPATIENT)
Dept: GENERAL RADIOLOGY | Age: 49
End: 2019-06-27
Payer: COMMERCIAL

## 2019-06-27 ENCOUNTER — HOSPITAL ENCOUNTER (EMERGENCY)
Age: 49
Discharge: HOME | End: 2019-06-27
Payer: COMMERCIAL

## 2019-06-27 VITALS
WEIGHT: 245 LBS | RESPIRATION RATE: 16 BRPM | DIASTOLIC BLOOD PRESSURE: 76 MMHG | TEMPERATURE: 97.8 F | SYSTOLIC BLOOD PRESSURE: 134 MMHG | OXYGEN SATURATION: 97 % | BODY MASS INDEX: 33.23 KG/M2 | HEART RATE: 53 BPM

## 2019-06-27 DIAGNOSIS — S93.401A SPRAIN OF RIGHT ANKLE, UNSPECIFIED LIGAMENT, INITIAL ENCOUNTER: Primary | ICD-10-CM

## 2019-06-27 PROCEDURE — 73610 X-RAY EXAM OF ANKLE: CPT

## 2019-06-27 PROCEDURE — 73630 X-RAY EXAM OF FOOT: CPT

## 2019-06-27 PROCEDURE — 99213 OFFICE O/P EST LOW 20 MIN: CPT

## 2019-06-27 PROCEDURE — L4350 ANKLE CONTROL ORTHO PRE OTS: HCPCS

## 2019-06-27 ASSESSMENT — PAIN DESCRIPTION - LOCATION: LOCATION: ANKLE

## 2019-06-27 ASSESSMENT — PAIN DESCRIPTION - PAIN TYPE: TYPE: ACUTE PAIN

## 2019-06-27 ASSESSMENT — PAIN SCALES - GENERAL: PAINLEVEL_OUTOF10: 8

## 2019-06-27 ASSESSMENT — PAIN DESCRIPTION - ORIENTATION: ORIENTATION: RIGHT

## 2019-06-27 ASSESSMENT — PAIN DESCRIPTION - DESCRIPTORS: DESCRIPTORS: THROBBING;SHARP

## 2019-06-27 NOTE — ED PROVIDER NOTES
Department of Emergency Medicine   ED  Provider Note  Admit Date/RoomTime: 6/27/2019  2:17 PM  ED Room: 02/02  Chief Complaint:   Ankle Pain (Pt c/o R ankle pain after missing a step last night)    History of Present Illness   Source of history provided by:  patient. History/Exam Limitations: none. Marguerite Ruvalcaba is a 51 y.o. old female presenting to the emergency department by private vehicle, for Right ankle pain which occured 1 day(s) prior to arrival.  Cause of complaint: twisting injury while at home. There has been a history of no prior problems with this area in the past.  Since onset the symptoms have been moderate in degree with ability to bear weight, but with some pain. Her pain is aggraveated by walking and relieved by nothing. ROS    Pertinent positives and negatives are stated within HPI, all other systems reviewed and are negative. Past Surgical History:   Procedure Laterality Date    CHOLECYSTECTOMY  2003    OVARIAN CYST SURGERY  1990    OR COLONOSCOPY W/BIOPSY SINGLE/MULTIPLE N/A 12/3/2018    COLONOSCOPY WITH BIOPSY performed by David Marino MD at [facility] History:  reports that she quit smoking about 8 years ago. Her smoking use included cigarettes. She smoked 0.25 packs per day. She has never used smokeless tobacco. She reports that she drinks about 3.0 - 4.2 oz of alcohol per week. She reports that she does not use drugs. Family History: family history includes ADHD in her son; Alcohol Abuse in her maternal grandfather and paternal grandfather; Arrhythmia in her father; Atrial Fibrillation in her father; Colon Polyps in her mother; High Blood Pressure in her father and mother. Allergies: Ace inhibitors;  Lisinopril; and Cat hair extract    Physical Exam           ED Triage Vitals   BP Temp Temp src Pulse Resp SpO2 Height Weight   -- -- -- -- -- -- -- --       Oxygen Saturation Interpretation: Normal.    Constitutional:  Alert, development Fever with headache, neck pain and decreased po intake since yesterday consistent with age. Neck:  Normal ROM. Supple. Ankle:  Right Lateral:              Tenderness:  moderate. Swelling: Moderate. Deformity: no.             ROM: diminished range with pain. Skin:  no erythema, rash or wounds noted. Neurovascular: Motor deficit: none. Sensory deficit:   none. Pulse deficit: none. 2+ dorsalis pedis pulse of the right foot. Capillary refill: normal.  Knee:              Tenderness:  none. No proximal fibular tenderness to palpation. Swelling: None. Deformity: no.             ROM: full range of motion. Skin:  no erythema, rash or wounds noted. Foot:              Tenderness:  Mild, lateral.              Swelling: None. Deformity: no.             ROM: full range of motion. Skin:  no erythema, rash or wounds noted. Gait:  limp. Lymphatics: No lymphangitis or adenopathy noted. Neurological:  Oriented. Motor functions intact. Lab / Imaging Results   (All laboratory and radiology results have been personally reviewed by myself)  Labs:  No results found for this visit on 06/27/19. Imaging: All Radiology results interpreted by Radiologist unless otherwise noted. XR ANKLE RIGHT (MIN 3 VIEWS)   Final Result   No acute findings. XR FOOT RIGHT (MIN 3 VIEWS)   Final Result   No acute findings. ED Course / Medical Decision Making   Medications - No data to display     Consults:   None    Procedure(s):   none    MDM:       No acute fracture dislocation on x-ray today. No proximal fibular tenderness. Patient has a good peripheral pulse. Advised ice and elevate. Given crutches. Advised to return to the ER or urgent care for any worsening symptoms. Otherwise to follow-up with PCP. Counseling:    The emergency provider has spoken with the patient and discussed todays results, in addition to providing specific details for the plan of care and counseling regarding the diagnosis and prognosis. Questions are answered at this time and they are agreeable with the plan. Assessment      1. Sprain of right ankle, unspecified ligament, initial encounter      Plan   Discharge to home  Patient condition is good    New Medications     New Prescriptions    No medications on file     Electronically signed by CANDACE Chahal   DD: 6/27/19  **This report was transcribed using voice recognition software. Every effort was made to ensure accuracy; however, inadvertent computerized transcription errors may be present.   END OF ED PROVIDER NOTE       Ron Chahal  06/27/19 2638

## 2020-02-19 ENCOUNTER — OFFICE VISIT (OUTPATIENT)
Dept: FAMILY MEDICINE CLINIC | Age: 50
End: 2020-02-19
Payer: COMMERCIAL

## 2020-02-19 VITALS
BODY MASS INDEX: 35.49 KG/M2 | TEMPERATURE: 98 F | SYSTOLIC BLOOD PRESSURE: 124 MMHG | HEIGHT: 72 IN | OXYGEN SATURATION: 97 % | DIASTOLIC BLOOD PRESSURE: 78 MMHG | WEIGHT: 262 LBS | HEART RATE: 95 BPM

## 2020-02-19 PROCEDURE — 99213 OFFICE O/P EST LOW 20 MIN: CPT | Performed by: NURSE PRACTITIONER

## 2020-02-19 RX ORDER — METHYLPREDNISOLONE 4 MG/1
TABLET ORAL
Qty: 1 KIT | Refills: 0 | Status: SHIPPED | OUTPATIENT
Start: 2020-02-19 | End: 2020-02-25

## 2020-02-19 RX ORDER — AZITHROMYCIN 250 MG/1
250 TABLET, FILM COATED ORAL SEE ADMIN INSTRUCTIONS
Qty: 6 TABLET | Refills: 0 | Status: SHIPPED | OUTPATIENT
Start: 2020-02-19 | End: 2020-02-24

## 2020-02-19 RX ORDER — METOPROLOL SUCCINATE 50 MG/1
TABLET, EXTENDED RELEASE ORAL
Qty: 90 TABLET | Refills: 1 | OUTPATIENT
Start: 2020-02-19

## 2020-02-19 NOTE — PROGRESS NOTES
Chief Complaint:   Chest Congestion (green/ yellow  productive Phlegm x 2 weeks )    History of Present Illness   Source of history provided by:  patientMarcos Valentin is a 51 y.o. old female with a past medical history of:   Past Medical History:   Diagnosis Date    ADHD (attention deficit hyperactivity disorder)     Fear of travel with panic attacks     Hypertension     Right ankle injury 4/2015    Presents to the walk in clinic for evaluation of sinus pressure, nasal congestion, discolored nasal drainage, bilateral ear pressure, productive cough, chest congestion, and sore throat x 14 days. Has been taking OTC without relief. Denies any fever, chills, wheezing, CP, SOB, or GI symptoms. No hx of asthma, COPD, or tobacco use. ROS    Unless otherwise stated in this report or unable to obtain because of the patient's clinical or mental status as evidenced by the medical record, this patients's positive and negative responses for Review of Systems, constitutional, psych, eyes, ENT, cardiovascular, respiratory, gastrointestinal, neurological, genitourinary, musculoskeletal, integument systems and systems related to the presenting problem are either stated in the preceding or were not pertinent or were negative for the symptoms and/or complaints related to the medical problem. Past Surgical History:  has a past surgical history that includes Ovarian cyst surgery (1990); Cholecystectomy (2003); and pr colonoscopy w/biopsy single/multiple (N/A, 12/3/2018). Social History:  reports that she quit smoking about 8 years ago. Her smoking use included cigarettes. She smoked 0.25 packs per day. She has never used smokeless tobacco. She reports current alcohol use of about 5.0 - 7.0 standard drinks of alcohol per week. She reports that she does not use drugs. Family History: family history includes ADHD in her son;  Alcohol Abuse in her maternal grandfather and paternal grandfather; Arrhythmia in her father; Atrial Fibrillation in her father; Colon Polyps in her mother; High Blood Pressure in her father and mother. Allergies: Ace inhibitors; Lisinopril; and Cat hair extract    Physical Exam         VS:  /78   Pulse 95   Temp 98 °F (36.7 °C) (Oral)   Ht 6' (1.829 m)   Wt 262 lb (118.8 kg)   LMP 05/01/2017   SpO2 97%   BMI 35.53 kg/m²    Oxygen Saturation Interpretation: Normal.    Constitutional:  Alert, development consistent with age. Head: TTP over the maxillary sinuses. Ears:  External Ears: Bilateral pinna normal. TMs visualized dull & bulging. or perforation bilaterally. Nose:  Moderate congestion of the nasal mucosa. There is moderate injection to middle turbinates bilaterally. Throat: Moderate posterior pharyngeal erythema with mild post nasal drip present. No exudate or tonsillar hypertrophy noted. Neck:  Supple. There is no anterior cervical adenopathy. Lungs: CTAB without wheezes, rales, or rhonchi. Cough is moist & productive throughout examination. Heart:  Regular rate and rhythm, normal heart sounds, without pathological murmurs, ectopy, gallops, or rubs. Skin:  Normal turgor. Warm, dry, without visible rash. Neurological:  Alert and oriented. Motor functions intact. Responds to verbal commands. Lab / Imaging Results   (All laboratory and radiology results have been personally reviewed by myself)  Labs:  No results found for this visit on 02/19/20. Assessment / Plan     Impression(s):  1. Sinobronchitis      Disposition:  Disposition: stable    Script written for Medrol dose-pack & azithromycin, side effects discussed. Increase fluids and rest. Symptomatic relief discussed. F/u PCP in 5-7 days if symptoms persist. ED sooner if symptoms worsen or change. Red flag symptoms discussed. Pt is in agreement with this care plan. All questions answered.

## 2020-04-20 ENCOUNTER — NURSE TRIAGE (OUTPATIENT)
Dept: OTHER | Facility: CLINIC | Age: 50
End: 2020-04-20

## 2020-04-20 ENCOUNTER — TELEPHONE (OUTPATIENT)
Dept: FAMILY MEDICINE CLINIC | Age: 50
End: 2020-04-20

## 2020-04-20 ENCOUNTER — E-VISIT (OUTPATIENT)
Dept: FAMILY MEDICINE CLINIC | Age: 50
End: 2020-04-20

## 2020-04-20 ENCOUNTER — TELEMEDICINE (OUTPATIENT)
Dept: FAMILY MEDICINE CLINIC | Age: 50
End: 2020-04-20
Payer: COMMERCIAL

## 2020-04-20 VITALS
WEIGHT: 252 LBS | DIASTOLIC BLOOD PRESSURE: 100 MMHG | HEART RATE: 66 BPM | TEMPERATURE: 96.9 F | SYSTOLIC BLOOD PRESSURE: 160 MMHG | HEIGHT: 72 IN | BODY MASS INDEX: 34.13 KG/M2

## 2020-04-20 PROCEDURE — 99213 OFFICE O/P EST LOW 20 MIN: CPT | Performed by: FAMILY MEDICINE

## 2020-04-20 RX ORDER — KETOROLAC TROMETHAMINE 10 MG/1
10 TABLET, FILM COATED ORAL EVERY 6 HOURS PRN
Qty: 20 TABLET | Refills: 0 | Status: SHIPPED
Start: 2020-04-20 | End: 2020-05-04 | Stop reason: SDUPTHER

## 2020-04-20 RX ORDER — METOPROLOL SUCCINATE 50 MG/1
25 TABLET, EXTENDED RELEASE ORAL DAILY
Qty: 90 TABLET | Refills: 0 | Status: SHIPPED
Start: 2020-04-20 | End: 2020-05-04 | Stop reason: SDUPTHER

## 2020-04-20 ASSESSMENT — ENCOUNTER SYMPTOMS
WHEEZING: 0
BACK PAIN: 0
BLOOD IN STOOL: 0
NAUSEA: 0
ABDOMINAL PAIN: 0
SORE THROAT: 0
VOMITING: 0
CONSTIPATION: 0
COUGH: 0
SHORTNESS OF BREATH: 0
DIARRHEA: 0

## 2020-04-20 NOTE — PROGRESS NOTES
an old prescription of Tramadol. Nothing has provided relief. The headache makes her nauseated    Her  was tested COVID-19 + on 3/25/2020. His symptoms have abated and he has returned to work. She has has not been tested but is presumed to be COVID-19 +. Hypertension:  She is in need of refill of metoprolol at this time. Other medication refills are due in 5/2020    [address]:  Patient's past medical, surgical, social and/or family history reviewed, updated in chart, and are non-contributory (unless otherwise stated). Medications and allergies also reviewed and updated in chart. Review of Systems  Review of Systems   HENT: Negative for congestion, ear pain and sore throat. Respiratory: Negative for cough, shortness of breath and wheezing. Cardiovascular: Negative for chest pain, palpitations and leg swelling. Gastrointestinal: Negative for abdominal pain, blood in stool, constipation, diarrhea, nausea and vomiting. Genitourinary: Negative for dysuria, frequency, hematuria and urgency. Musculoskeletal: Negative for back pain, myalgias and neck pain. Skin: Negative for rash. Neurological: Negative for dizziness, weakness and headaches. Psychiatric/Behavioral: The patient is not nervous/anxious. PHYSICAL EXAMINATION:  [ INSTRUCTIONS:  \"[x]\" Indicates a positive item  \"[]\" Indicates a negative item  -- DELETE ALL ITEMS NOT EXAMINED]  Vital Signs: (As obtained by patient/caregiver or practitioner observation)    Vitals:    04/20/20 1411   BP: (!) 160/100   Pulse: 66   Temp: 96.9 °F (36.1 °C)   TempSrc: Oral   Weight: 252 lb (114.3 kg)   Height: 6' (1.829 m)       Physical Exam  Constitutional:       General: She is not in acute distress. Appearance: Normal appearance. She is well-developed and overweight. She is not diaphoretic. HENT:      Head: Normocephalic and atraumatic.       Right Ear: External ear normal.      Left Ear: External ear normal.      Mouth/Throat: Pharynx: No oropharyngeal exudate. Eyes:      General: No scleral icterus. Conjunctiva/sclera: Conjunctivae normal.   Neck:      Musculoskeletal: Normal range of motion and neck supple. Thyroid: No thyromegaly. Pulmonary:      Effort: Pulmonary effort is normal. No respiratory distress. Musculoskeletal: Normal range of motion. Neurological:      Mental Status: She is alert and oriented to person, place, and time. Psychiatric:         Behavior: Behavior normal. Behavior is cooperative. Thought Content: Thought content normal.         Assessment / Plan:      Radames Bacon was seen today for headache. Diagnoses and all orders for this visit:    Acute intractable tension-type headache:  Differential diagnosis includes suspected COVID-19 infection. All other conservative measures, inculding treamtnet for sinus symptoms and treatment with Tramadol have been ineffective thus far  -     ketorolac (TORADOL) 10 MG tablet; Take 1 tablet by mouth every 6 hours as needed for Pain  -     Written/verbal information about headache management provided to patient via [facility] email    Suspected COVID-19 virus infection:  Symptoms are mild to moderate        -     Verbal/written information about COVID-19 management provided to patient via [employer] [facility] email    Essential hypertension:  Due for metoprolol refill  -     metoprolol succinate (TOPROL XL) 50 MG extended release tablet; Take 0.5 tablets by mouth daily    Follow Up:  Return in about 2 weeks (around 5/4/2020) for Check up, Medication refills. or sooner if necessary. Call or go to ED immediately if symptoms worsen or persist.    Educational materials and/or home exercises printed for patient's review and were included in patient instructions on his/her AfterVisit Summary and given to patient at the end of visit. Counseled regarding above diagnosis,including possible risks and complications,  especially if left uncontrolled.     Counseled

## 2020-04-20 NOTE — TELEPHONE ENCOUNTER
I called the patient to room her for her video visit with Dr Ana Wade and got her voicemail. Left her a message to call back.

## 2020-04-24 ENCOUNTER — APPOINTMENT (OUTPATIENT)
Dept: CT IMAGING | Age: 50
End: 2020-04-24
Payer: COMMERCIAL

## 2020-04-24 ENCOUNTER — HOSPITAL ENCOUNTER (EMERGENCY)
Age: 50
Discharge: HOME | End: 2020-04-24
Attending: EMERGENCY MEDICINE
Payer: COMMERCIAL

## 2020-04-24 VITALS
DIASTOLIC BLOOD PRESSURE: 94 MMHG | HEART RATE: 55 BPM | HEIGHT: 72 IN | OXYGEN SATURATION: 97 % | BODY MASS INDEX: 34.54 KG/M2 | TEMPERATURE: 98.2 F | RESPIRATION RATE: 16 BRPM | SYSTOLIC BLOOD PRESSURE: 175 MMHG | WEIGHT: 255 LBS

## 2020-04-24 LAB
EKG ATRIAL RATE: 55 BPM
EKG P AXIS: 26 DEGREES
EKG P-R INTERVAL: 176 MS
EKG Q-T INTERVAL: 454 MS
EKG QRS DURATION: 88 MS
EKG QTC CALCULATION (BAZETT): 434 MS
EKG R AXIS: 15 DEGREES
EKG T AXIS: 38 DEGREES
EKG VENTRICULAR RATE: 55 BPM

## 2020-04-24 PROCEDURE — 99284 EMERGENCY DEPT VISIT MOD MDM: CPT

## 2020-04-24 PROCEDURE — 96374 THER/PROPH/DIAG INJ IV PUSH: CPT

## 2020-04-24 PROCEDURE — 96375 TX/PRO/DX INJ NEW DRUG ADDON: CPT

## 2020-04-24 PROCEDURE — 93005 ELECTROCARDIOGRAM TRACING: CPT | Performed by: EMERGENCY MEDICINE

## 2020-04-24 PROCEDURE — 70450 CT HEAD/BRAIN W/O DYE: CPT

## 2020-04-24 PROCEDURE — 6360000002 HC RX W HCPCS: Performed by: EMERGENCY MEDICINE

## 2020-04-24 PROCEDURE — 93010 ELECTROCARDIOGRAM REPORT: CPT | Performed by: INTERNAL MEDICINE

## 2020-04-24 RX ORDER — DIPHENHYDRAMINE HYDROCHLORIDE 50 MG/ML
25 INJECTION INTRAMUSCULAR; INTRAVENOUS ONCE
Status: COMPLETED | OUTPATIENT
Start: 2020-04-24 | End: 2020-04-24

## 2020-04-24 RX ORDER — KETOROLAC TROMETHAMINE 30 MG/ML
30 INJECTION, SOLUTION INTRAMUSCULAR; INTRAVENOUS EVERY 6 HOURS PRN
Status: DISCONTINUED | OUTPATIENT
Start: 2020-04-24 | End: 2020-04-24 | Stop reason: HOSPADM

## 2020-04-24 RX ORDER — ONDANSETRON 2 MG/ML
4 INJECTION INTRAMUSCULAR; INTRAVENOUS EVERY 6 HOURS PRN
Status: DISCONTINUED | OUTPATIENT
Start: 2020-04-24 | End: 2020-04-24 | Stop reason: HOSPADM

## 2020-04-24 RX ORDER — BUTALBITAL, ACETAMINOPHEN AND CAFFEINE 300; 40; 50 MG/1; MG/1; MG/1
1 CAPSULE ORAL EVERY 4 HOURS PRN
Qty: 84 CAPSULE | Refills: 0 | Status: SHIPPED | OUTPATIENT
Start: 2020-04-24 | End: 2022-07-13

## 2020-04-24 RX ORDER — MORPHINE SULFATE 10 MG/ML
8 INJECTION, SOLUTION INTRAMUSCULAR; INTRAVENOUS ONCE
Status: COMPLETED | OUTPATIENT
Start: 2020-04-24 | End: 2020-04-24

## 2020-04-24 RX ADMIN — KETOROLAC TROMETHAMINE 30 MG: 30 INJECTION, SOLUTION INTRAMUSCULAR at 13:39

## 2020-04-24 RX ADMIN — ONDANSETRON 4 MG: 2 INJECTION INTRAMUSCULAR; INTRAVENOUS at 13:39

## 2020-04-24 RX ADMIN — MORPHINE SULFATE 8 MG: 10 INJECTION INTRAVENOUS at 14:39

## 2020-04-24 RX ADMIN — DIPHENHYDRAMINE HYDROCHLORIDE 25 MG: 50 INJECTION, SOLUTION INTRAMUSCULAR; INTRAVENOUS at 13:40

## 2020-04-24 ASSESSMENT — PAIN DESCRIPTION - LOCATION
LOCATION: CHEST
LOCATION: HEAD
LOCATION: HEAD
LOCATION: CHEST

## 2020-04-24 ASSESSMENT — PAIN SCALES - GENERAL
PAINLEVEL_OUTOF10: 5
PAINLEVEL_OUTOF10: 7
PAINLEVEL_OUTOF10: 7
PAINLEVEL_OUTOF10: 8
PAINLEVEL_OUTOF10: 6
PAINLEVEL_OUTOF10: 9
PAINLEVEL_OUTOF10: 8

## 2020-04-24 ASSESSMENT — PAIN DESCRIPTION - PAIN TYPE
TYPE: ACUTE PAIN
TYPE: ACUTE PAIN

## 2020-04-24 NOTE — ED PROVIDER NOTES
reviewed. Allergies: Ace inhibitors; Lisinopril; and Cat hair extract    -------------------------------------------------- RESULTS -------------------------------------------------  All laboratory and radiology results have been personally reviewed by myself   LABS:  No results found for this visit on 04/24/20. RADIOLOGY:  Interpreted by Radiologist.  No orders to display       ------------------------- NURSING NOTES AND VITALS REVIEWED ---------------------------   The nursing notes within the ED encounter and vital signs as below have been reviewed. BP (!) 189/91   Pulse 56   Temp 98.2 °F (36.8 °C) (Oral)   Resp 16   Ht 6' (1.829 m)   Wt 255 lb (115.7 kg)   LMP 05/01/2017   SpO2 94%   BMI 34.58 kg/m²   Oxygen Saturation Interpretation: Normal      ---------------------------------------------------PHYSICAL EXAM--------------------------------------      Constitutional/General: Alert and oriented x3, well appearing, non toxic in NAD  Head: NC/AT  Eyes: PERRL, EOMI  Mouth: Oral mucosa appears well-hydrated  Neck: Supple, full ROM, no meningeal signs  Pulmonary: Lungs clear to auscultation bilaterally, no wheezes, rales, or rhonchi. Not in respiratory distress  Cardiovascular:  Regular rate and rhythm, no murmurs, gallops, or rubs. 2+ distal pulses  Abdomen: Soft, non tender, non distended,   Extremities: Moves all extremities x 4. Warm and well perfused  Skin: warm and dry without rash  Neurologic: GCS 15, there are no focal neurological deficits  Psych: Normal Affect      ------------------------------ ED COURSE/MEDICAL DECISION MAKING----------------------  Medications   ketorolac (TORADOL) injection 30 mg (has no administration in time range)   ondansetron (ZOFRAN) injection 4 mg (has no administration in time range)   diphenhydrAMINE (BENADRYL) injection 25 mg (has no administration in time range)         Medical Decision Making:      Time: 2:20p  Re-evaluation.   Patients symptoms are

## 2020-04-24 NOTE — ED NOTES
States gets chest discomfort and feeling like she has to burp sometimes when she is under stress or anxious.        Johny Castañeda RN  04/24/20 3157

## 2020-04-27 ASSESSMENT — ENCOUNTER SYMPTOMS
VOMITING: 0
DIARRHEA: 0
BACK PAIN: 0
NAUSEA: 0
SORE THROAT: 0
CONSTIPATION: 0
ABDOMINAL PAIN: 0
SHORTNESS OF BREATH: 0
COUGH: 0
BLOOD IN STOOL: 0
WHEEZING: 0

## 2020-04-27 NOTE — PROGRESS NOTES
Medical Decision Making:      Re-evaluation. Patients symptoms are improving following the above medication cocktail  Repeat physical examination is improved        --------------------------------- IMPRESSION AND DISPOSITION ---------------------------------     IMPRESSION  1. Nonintractable episodic headache, unspecified headache type       Current status:  Headaches have been persistent. She believes that a trial of diphenhydramine over the weekend may have provided some relief. Trial of rizatriptan x a couple of doses was ineffective. Further history: she has has been home nonstop for several weeks with 2 home based cats, to which she is allergic    Hypertension:  Patient is here for follow up chronic hypertension. This is not generally controlled on current medication regimen (currently on Maxzide 25 1 1/2 tablets/day and metoprolol succinate [Toprol XL] 25 mg/day). BP today is 136/90; she reports that it has been somewhat elevated at home recently. Takes meds as directed and tolerates them well. Symptoms at this time include headaches as above. Fair lifestyle compliance between presence of headaches and pandemic. Patient does not smoke. Comorbid conditions include stress/anxiety without medication treatment. She is due for medication refills. Anxiety and/or depression: This is a/an chronic problem. This has been going on for greater than 1 year. Exacerbating factors include concerns about the well being of her family (especially her college aged son), headache symptoms, and recent pandemic. Alleviating factors include compliance with medication as prescribed. Treatment in the past includes escitalopram, bupropion. Anxiety symptoms include palpitations, anxious mood. Depressive symptoms include depressed mood, anhedonia, fatigue. Patient does not have suicidal or homicidal ideation. Patient is not having issues falling or staying asleep.   Patient is not having associated panic attacks. The above is not interfering with quality of life. Patient has seen a Counselor before. Patient does not use alcohol and/or drugs. Family history includes N/A. Currently she reports that she is stable and well controlled with escitalopram 20 mg/day. She tolerates this well and denies significant side effects. She is due for medication refill      [address] [place]:  Patient's past medical, surgical, social and/or family history reviewed, updated in chart, and are non-contributory (unless otherwise stated). Medications and allergies also reviewed and updated in chart. Review of Systems  Review of Systems   HENT: Negative for congestion, ear pain and sore throat. Respiratory: Negative for cough, shortness of breath and wheezing. Cardiovascular: Negative for chest pain, palpitations and leg swelling. Gastrointestinal: Negative for abdominal pain, blood in stool, constipation, diarrhea, nausea and vomiting. Genitourinary: Negative for dysuria, frequency, hematuria and urgency. Musculoskeletal: Negative for back pain, myalgias and neck pain. Skin: Negative for rash. Neurological: Positive for headaches. Negative for dizziness and weakness. Psychiatric/Behavioral: The patient is not nervous/anxious. Physical Exam:    VS:  BP (!) 136/90   Pulse 61   Temp 97.6 °F (36.4 °C) (Oral)   Resp 20   Ht 6' (1.829 m)   Wt 261 lb (118.4 kg)   LMP 05/01/2017   SpO2 97%   BMI 35.40 kg/m²     LAST WEIGHT:  Wt Readings from Last 3 Encounters:   05/04/20 261 lb (118.4 kg)   04/24/20 255 lb (115.7 kg)   04/20/20 252 lb (114.3 kg)       BMI Readings from Last 3 Encounters:   05/04/20 35.40 kg/m²   04/24/20 34.58 kg/m²   04/20/20 34.18 kg/m²       Physical Exam  Constitutional:       General: She is not in acute distress. Appearance: She is well-developed. She is not diaphoretic. HENT:      Head: Normocephalic and atraumatic.       Comments: Reproducible tightness and tension from occipital skull into occiput and into neck and shoulders/trapezii bilateral     Right Ear: External ear normal.      Left Ear: External ear normal.      Nose: Nose normal.      Right Sinus: No maxillary sinus tenderness or frontal sinus tenderness. Left Sinus: No maxillary sinus tenderness or frontal sinus tenderness. Mouth/Throat:      Mouth: Mucous membranes are moist.      Pharynx: Oropharynx is clear. Uvula midline. No pharyngeal swelling, oropharyngeal exudate, posterior oropharyngeal erythema or uvula swelling. Eyes:      General: No scleral icterus. Right eye: No discharge. Conjunctiva/sclera: Conjunctivae normal.      Pupils: Pupils are equal, round, and reactive to light. Neck:      Musculoskeletal: Normal range of motion and neck supple. Thyroid: No thyromegaly. Cardiovascular:      Rate and Rhythm: Normal rate and regular rhythm. Heart sounds: Normal heart sounds. No murmur. Pulmonary:      Effort: Pulmonary effort is normal. No respiratory distress. Breath sounds: No stridor. No wheezing or rales. Chest:      Chest wall: No tenderness. Abdominal:      General: Bowel sounds are normal. There is no distension. Palpations: Abdomen is soft. There is no mass. Tenderness: There is no abdominal tenderness. There is no guarding. Musculoskeletal: Normal range of motion. General: No tenderness. Lymphadenopathy:      Cervical: No cervical adenopathy. Skin:     General: Skin is warm and dry. Coloration: Skin is not pale. Findings: No erythema or rash. Neurological:      Mental Status: She is alert and oriented to person, place, and time. Psychiatric:         Behavior: Behavior normal.         Thought Content:  Thought content normal.         Labs:  Lab Results   Component Value Date     11/19/2018    K 4.3 11/19/2018     11/19/2018    CO2 28 11/19/2018    BUN 15 11/19/2018    CREATININE 1.2 11/19/2018    PROT 6.9 11/19/2018

## 2020-04-28 ENCOUNTER — TELEPHONE (OUTPATIENT)
Dept: PRIMARY CARE CLINIC | Age: 50
End: 2020-04-28

## 2020-04-28 NOTE — TELEPHONE ENCOUNTER
[facility] ED Follow Up Call    ED Visit Date:  2020    Patient: Eliu Hardin Patient : 1970     Marimar Bridges MD     Spoke with: Patient    Interactive Patient Contact:  Was patient able to fill all prescriptions: yes  Is patient taking all medications as directed on the After Visit Summary? yes  Does patient understand their visit instructions: Yes  Does patient have questions or concerns that need addressed?: no    Current patient status and patient narrative:  not changed - states pain is 3 or 4 on Saturday but since then 7 or 8 on scale of 1 -10. Self-care instructions reviewed:  yes    Face to face required? no  Able to participate in virtual visit? yes, offered earlier visit but pt prefers to keep appt on 2020 that was previously scheduled with PCP. ED summary reviewed, including labs, tests and important procedures:yes    Instructed to call PCP or return to ED if symptoms persist or worsen. Pt verbalized understanding.            Marimar Monsivais RN   20

## 2020-05-01 ENCOUNTER — PATIENT MESSAGE (OUTPATIENT)
Dept: FAMILY MEDICINE CLINIC | Age: 50
End: 2020-05-01

## 2020-05-01 RX ORDER — RIZATRIPTAN BENZOATE 5 MG/1
5 TABLET, ORALLY DISINTEGRATING ORAL PRN
Qty: 30 TABLET | Refills: 2 | Status: SHIPPED | OUTPATIENT
Start: 2020-05-01

## 2020-05-04 ENCOUNTER — OFFICE VISIT (OUTPATIENT)
Dept: FAMILY MEDICINE CLINIC | Age: 50
End: 2020-05-04
Payer: COMMERCIAL

## 2020-05-04 VITALS
BODY MASS INDEX: 35.35 KG/M2 | HEART RATE: 61 BPM | SYSTOLIC BLOOD PRESSURE: 136 MMHG | TEMPERATURE: 97.6 F | HEIGHT: 72 IN | RESPIRATION RATE: 20 BRPM | DIASTOLIC BLOOD PRESSURE: 90 MMHG | OXYGEN SATURATION: 97 % | WEIGHT: 261 LBS

## 2020-05-04 PROBLEM — J30.81 ALLERGIC RHINITIS DUE TO ANIMAL HAIR AND DANDER: Status: ACTIVE | Noted: 2020-05-04

## 2020-05-04 PROBLEM — G44.209 TENSION HEADACHE: Status: ACTIVE | Noted: 2020-05-04

## 2020-05-04 PROCEDURE — 99214 OFFICE O/P EST MOD 30 MIN: CPT | Performed by: FAMILY MEDICINE

## 2020-05-04 RX ORDER — METOPROLOL SUCCINATE 50 MG/1
25 TABLET, EXTENDED RELEASE ORAL DAILY
Qty: 90 TABLET | Refills: 0 | Status: SHIPPED
Start: 2020-05-04 | End: 2020-10-19 | Stop reason: SDUPTHER

## 2020-05-04 RX ORDER — ONDANSETRON 4 MG/1
TABLET, ORALLY DISINTEGRATING ORAL
COMMUNITY
Start: 2020-04-27

## 2020-05-04 RX ORDER — FLUTICASONE PROPIONATE 50 MCG
2 SPRAY, SUSPENSION (ML) NASAL DAILY
Qty: 1 BOTTLE | Refills: 5 | Status: SHIPPED
Start: 2020-05-04 | End: 2021-04-06 | Stop reason: SDUPTHER

## 2020-05-04 RX ORDER — ESCITALOPRAM OXALATE 20 MG/1
20 TABLET ORAL DAILY
Qty: 90 TABLET | Refills: 3 | Status: SHIPPED
Start: 2020-05-04 | End: 2020-12-31 | Stop reason: SDUPTHER

## 2020-05-04 RX ORDER — TRIAMTERENE AND HYDROCHLOROTHIAZIDE 37.5; 25 MG/1; MG/1
1 TABLET ORAL DAILY
Qty: 90 TABLET | Refills: 3 | Status: SHIPPED
Start: 2020-05-04 | End: 2021-04-06 | Stop reason: SDUPTHER

## 2020-05-04 NOTE — PATIENT INSTRUCTIONS
bedroom. · If you are allergic to house dust and mites, do not use home humidifiers. Your doctor can suggest ways you can control dust and mites. · Look for signs of cockroaches. Cockroaches cause allergic reactions. Use cockroach baits to get rid of them. Then, clean your home well. Cockroaches like areas where grocery bags, newspapers, empty bottles, or cardboard boxes are stored. Do not keep these inside your home, and keep trash and food containers sealed. Seal off any spots where cockroaches might enter your home. · If you are allergic to mold, get rid of furniture, rugs, and drapes that smell musty. Check for mold in the bathroom. · If you are allergic to outdoor pollen or mold spores, use air-conditioning. Change or clean all filters every month. Keep windows closed. · If you are allergic to pollen, stay inside when pollen counts are high. Use a vacuum  with a HEPA filter or a double-thickness filter at least two times each week. · Stay inside when air pollution is bad. Avoid paint fumes, perfumes, and other strong odors. · Avoid conditions that make your allergies worse. Stay away from smoke. Do not smoke or let anyone else smoke in your house. Do not use fireplaces or wood-burning stoves. · If you are allergic to your pets, change the air filter in your furnace every month. Use high-efficiency filters. · If you are allergic to pet dander, keep pets outside or out of your bedroom. Old carpet and cloth furniture can hold a lot of animal dander. You may need to replace them. When should you call for help? Give an epinephrine shot if:    · You think you are having a severe allergic reaction.     · You have symptoms in more than one body area, such as mild nausea and an itchy mouth.    After giving an epinephrine shot call  911, even if you feel better.   Call 911 if:    · You have symptoms of a severe allergic reaction.  These may include:  ? Sudden raised, red areas (hives) all over your safety. Be sure to make and go to all appointments, and call your doctor if you are having problems. It's also a good idea to know your test results and keep a list of the medicines you take. How can you care for yourself at home? · If you think that dust or dust mites are causing your allergies:  ? Wash sheets, pillowcases, and other bedding every week in hot water. ? Use airtight, dust-proof covers for pillows, duvets, and mattresses. Avoid plastic covers, because they tend to tear quickly and do not \"breathe. \" Wash according to the instructions. ? Remove extra blankets and pillows that you don't need. ? Use blankets that are machine-washable. ? Don't use home humidifiers. They can help mites live longer. · Use air-conditioning. Change or clean all filters every month. Keep windows closed. Use high-efficiency air filters. Don't use window or attic fans, which draw dust into the air. · If you're allergic to pet dander, keep pets outside or, at the very least, out of your bedroom. Old carpet and cloth-covered furniture can hold a lot of animal dander. You may need to replace them. · Look for signs of cockroaches. Use cockroach baits to get rid of them. Then clean your home well. · If you're allergic to mold, don't keep indoor plants, because molds can grow in soil. Get rid of furniture, rugs, and drapes that smell musty. Check for mold in the bathroom. · If you're allergic to pollen, stay inside when pollen counts are high. · Don't smoke or let anyone else smoke in your house. Don't use fireplaces or wood-burning stoves. Avoid paint fumes, perfumes, and other strong odors. When should you call for help? Give an epinephrine shot if:    · You think you are having a severe allergic reaction.    After giving an epinephrine shot call  911, even if you feel better.   Call 911 if:    · You have symptoms of a severe allergic reaction.  These may include:  ? Sudden raised, red areas (hives) all over your body.  ? Swelling of the throat, mouth, lips, or tongue. ? Trouble breathing. ? Passing out (losing consciousness). Or you may feel very lightheaded or suddenly feel weak, confused, or restless.     · You have been given an epinephrine shot, even if you feel better.    Call your doctor now or seek immediate medical care if:    · You have symptoms of an allergic reaction, such as:  ? A rash or hives (raised, red areas on the skin). ? Itching. ? Swelling. ? Belly pain, nausea, or vomiting.    Watch closely for changes in your health, and be sure to contact your doctor if:    · Your allergies get worse.     · You need help controlling your allergies.     · You have questions about allergy testing.     · You do not get better as expected. Where can you learn more? Go to https://Cojoinpekimberlyeb.Qubole. org and sign in to your [employer] account. Enter L249 in the WebSideStory box to learn more about \"Managing Your Allergies: Care Instructions. \"     If you do not have an account, please click on the \"Sign Up Now\" link. Current as of: October 6, 2019Content Version: 12.4  © 7219-8315 Healthwise, Incorporated. Care instructions adapted under license by [facility] ([facility]). If you have questions about a medical condition or this instruction, always ask your healthcare professional. [address] any warranty or liability for your use of this information. Patient Education        Rhinitis: Care Instructions  Your Care Instructions  Rhinitis is swelling and irritation in the nose. Allergies and infections are often the cause. Your nose may run or feel stuffy. Other symptoms are itchy and sore eyes, ears, throat, and mouth. If allergies are the cause, your doctor may do tests to find out what you are allergic to. You may be able to stop symptoms if you avoid the things that cause them. Your doctor may suggest or prescribe medicine to ease your symptoms.   Follow-up care is a key part of your treatment and safety. Be sure to make and go to all appointments, and call your doctor if you are having problems. It's also a good idea to know your test results and keep a list of the medicines you take. How can you care for yourself at home? · If your rhinitis is caused by allergies, try to find out what sets off (triggers) your symptoms. Take steps to avoid these triggers. ? Avoid yard work. It can stir up both pollen and mold. ? Do not smoke or allow others to smoke around you. If you need help quitting, talk to your doctor about stop-smoking programs and medicines. These can increase your chances of quitting for good. ? Do not use aerosol sprays, cleaning products, or perfumes. ? If pollen is one of your triggers, close your house and car windows during blooming season. ? Clean your house often to control dust.  ? Keep pets outside. · If your doctor recommends over-the-counter medicines to relieve symptoms, take your medicines exactly as prescribed. Call your doctor if you think you are having a problem with your medicine. · Use saline (saltwater) nasal washes to help keep your nasal passages open and wash out mucus and bacteria. You can buy saline nose drops at a grocery store or drugstore. Or you can make your own at home by adding 1 teaspoon of salt and 1 teaspoon of baking soda to 2 cups of distilled water. If you make your own, fill a bulb syringe with the solution, insert the tip into your nostril, and squeeze gently. Jerona Race your nose. When should you call for help? Call your doctor now or seek immediate medical care if:    · You are having trouble breathing.    Watch closely for changes in your health, and be sure to contact your doctor if:    · Mucus from your nose gets thicker (like pus) or has new blood in it.     · You have new or worse symptoms.     · You do not get better as expected. Where can you learn more? Go to https://chpekimberlyeb.health-partners. org and sign in to straight toward the outer wall of your nostril. This will help keep the medicine from irritating the inner walls of your nose, especially your septum (the wall that separates your left and right nostrils). · Don't blow your nose for 10 minutes or so after you spray. And try not to sneeze. · Be safe with medicines. Use this medicine exactly as prescribed. Call your doctor if you think you are having a problem with your medicine. · Clean your sprayer once a week. Read the label to learn how. When should you call for help? Watch closely for changes in your health, and be sure to contact your doctor if you have any problems. Where can you learn more? Go to https://HourVillepeSyncing.Neteb.CaptiveMotion. org and sign in to your [employer] account. Enter D702 in the [employer] box to learn more about \"Using a Nasal Steroid Spray: Care Instructions. \"     If you do not have an account, please click on the \"Sign Up Now\" link. Current as of: July 28, 2019Content Version: 12.4  © 9366-5339 Healthwise, Incorporated. Care instructions adapted under license by [facility] ([facility]). If you have questions about a medical condition or this instruction, always ask your healthcare professional. [address] any warranty or liability for your use of this information. Patient Education        Saline Nasal Washes: Care Instructions  Your Care Instructions  Saline nasal washes help keep the nasal passages open by washing out thick or dried mucus. This simple remedy can help relieve symptoms of allergies, sinusitis, and colds. It also can make the nose feel more comfortable by keeping the mucous membranes moist. You may notice a little burning sensation in your nose the first few times you use the solution, but this usually gets better in a few days. Follow-up care is a key part of your treatment and safety. Be sure to make and go to all appointments, and call your doctor if you are having problems.  It's each headache began, how long it lasted, and what the pain was like (throbbing, aching, stabbing, or dull). Write down any other symptoms you had with the headache, such as nausea, flashing lights or dark spots, or sensitivity to bright light or loud noise. Note if the headache occurred near your period. List anything that might have triggered the headache, such as certain foods (chocolate, cheese, wine) or odors, smoke, bright light, stress, or lack of sleep. · Find healthy ways to deal with stress. Headaches are most common during or right after stressful times. Take time to relax before and after you do something that has caused a headache in the past.  · Try to keep your muscles relaxed by keeping good posture. Check your jaw, face, neck, and shoulder muscles for tension, and try relaxing them. When sitting at a desk, change positions often, and stretch for 30 seconds each hour. · Get plenty of sleep and exercise. · Eat regularly and well. Long periods without food can trigger a headache. · Treat yourself to a massage. Some people find that regular massages are very helpful in relieving tension. · Limit caffeine by not drinking too much coffee, tea, or soda. But don't quit caffeine suddenly, because that can also give you headaches. · Reduce eyestrain from computers by blinking frequently and looking away from the computer screen every so often. Make sure you have proper eyewear and that your monitor is set up properly, about an arm's length away. · Seek help if you have depression or anxiety. Your headaches may be linked to these conditions. Treatment can both prevent headaches and help with symptoms of anxiety or depression. When should you call for help? Call 911 anytime you think you may need emergency care. For example, call if:    · You have signs of a stroke.  These may include:  ? Sudden numbness, paralysis, or weakness in your face, arm, or leg, especially on only one side of your body.  ? Sudden vision changes. ? Sudden trouble speaking. ? Sudden confusion or trouble understanding simple statements. ? Sudden problems with walking or balance. ? A sudden, severe headache that is different from past headaches.    Call your doctor now or seek immediate medical care if:    · You have a new or worse headache.     · Your headache gets much worse. Where can you learn more? Go to https://chpepiceweb.Cubicle. org and sign in to your [employer] account. Enter 1226 1820 in the [facility] box to learn more about \"Headache: Care Instructions. \"     If you do not have an account, please click on the \"Sign Up Now\" link. Current as of: November 19, 2019Content Version: 12.4  © 1165-1709 Healthwise, Incorporated. Care instructions adapted under license by [facility] ([facility]). If you have questions about a medical condition or this instruction, always ask your healthcare professional. [place] [address] any warranty or liability for your use of this information. Patient Education        Tension Headache: Care Instructions  Your Care Instructions  Most headaches are tension headaches. These headaches tend to happen again, especially if you are under stress. A tension headache may cause pain or a feeling of pressure all over your head. You probably can't pinpoint the center of the pain. If you keep getting tension headaches, the best thing you can do to limit them is to find out what is causing them and then make changes in those areas. Follow-up care is a key part of your treatment and safety. Be sure to make and go to all appointments, and call your doctor if you are having problems. It's also a good idea to know your test results and keep a list of the medicines you take. How can you care for yourself at home? · Rest in a quiet, dark room with a cool cloth on your forehead until your headache is gone. Close your eyes, and try to relax or go to sleep.  Don't watch TV or read. Avoid using the computer. · Use a warm, moist towel or a heating pad set on low to relax tight shoulder and neck muscles. · Have someone gently massage your neck and shoulders. · Take pain medicines exactly as directed. ? If the doctor gave you a prescription medicine for pain, take it as prescribed. ? If you are not taking a prescription pain medicine, ask your doctor if you can take an over-the-counter medicine. · Be careful not to take pain medicine more often than the instructions allow, because you may get worse or more frequent headaches when the medicine wears off. · If you get another tension headache, stop what you are doing and sit quietly for a moment. Close your eyes and breathe slowly. Try to relax your head and neck muscles. · Do not ignore new symptoms that occur with a headache, such as fever, weakness or numbness, vision changes, or confusion. These may be signs of a more serious problem. To help prevent headaches  · Keep a headache diary so you can figure out what triggers your headaches. Avoiding triggers may help you prevent headaches. Record when each headache began, how long it lasted, and what the pain was like (throbbing, aching, stabbing, or dull). List anything that may have triggered the headache, such as being physically or emotionally stressed or being anxious or depressed. Other possible triggers are hunger, anger, fatigue, poor posture, and muscle strain. · Find healthy ways to deal with stress. Headaches are most common during or right after stressful times. Take time to relax before and after you do something that has caused a headache in the past.  · Exercise daily to relieve stress. Relaxation exercises may help reduce tension. · Get plenty of sleep. · Eat regularly and well. Long periods without food can trigger a headache. · Treat yourself to a massage. Some people find that massages are very helpful in relieving tension.   · Try to keep your muscles relaxed by keeping good posture. Check your jaw, face, neck, and shoulder muscles for tension, and try to relax them. When sitting at a desk, change positions often, and stretch for 30 seconds each hour. · Reduce eyestrain from computers by blinking frequently and looking away from the computer screen every so often. Make sure you have proper eyewear and that your monitor is set up properly, about an arm's length away. When should you call for help? Call 911 anytime you think you may need emergency care. For example, call if:    · You have signs of a stroke. These may include:  ? Sudden numbness, paralysis, or weakness in your face, arm, or leg, especially on only one side of your body. ? Sudden vision changes. ? Sudden trouble speaking. ? Sudden confusion or trouble understanding simple statements. ? Sudden problems with walking or balance. ? A sudden, severe headache that is different from past headaches.    Call your doctor now or seek immediate medical care if:    · You have new or worse nausea and vomiting.     · You have a new or higher fever.     · Your headache gets much worse.    Watch closely for changes in your health, and be sure to contact your doctor if:    · You are not getting better after 2 days (48 hours). Where can you learn more? Go to https://Devex.Vedantu. org and sign in to your [employer] account. Enter 11 22 12 in the ParaShoot box to learn more about \"Tension Headache: Care Instructions. \"     If you do not have an account, please click on the \"Sign Up Now\" link. Current as of: November 19, 2019Content Version: 12.4  © 8266-3359 Healthwise, Incorporated. Care instructions adapted under license by [facility] ([facility]). If you have questions about a medical condition or this instruction, always ask your healthcare professional. [place] [address] any warranty or liability for your use of this information.          Patient Education         Headaches: Avoiding Triggers (01:35)  Your health professional recommends that you watch this short online health video. Learn some common headache triggers so you can plan to avoid the ones that affect you. How to watch the video    Scan the QR code   OR Visit the website    [email or web address]   Current as of: November 19, 2019Content Version: 12.4  © 7631-8633 [employer]. Care instructions adapted under license by [employer] [facility]). If you have questions about a medical condition or this instruction, always ask your healthcare professional. [address] any warranty or liability for your use of this information. Patient Education         Headaches: Keeping a Diary (01:31)  Your health professional recommends that you watch this short online health video. Learn how keeping a headache diary can help you find what's causing your pain. How to watch the video    Scan the QR code   OR Visit the website    [email or web address]   Current as of: November 19, 2019Content Version: 12.4  © 7486-3038 [employer]. Care instructions adapted under license by [employer] ([facility]). If you have questions about a medical condition or this instruction, always ask your healthcare professional. [address] any warranty or liability for your use of this information.

## 2020-07-11 ENCOUNTER — HOSPITAL ENCOUNTER (OUTPATIENT)
Age: 50
Discharge: HOME | End: 2020-07-13
Payer: COMMERCIAL

## 2020-07-11 ENCOUNTER — OFFICE VISIT (OUTPATIENT)
Dept: PRIMARY CARE CLINIC | Age: 50
End: 2020-07-11
Payer: COMMERCIAL

## 2020-07-11 VITALS
BODY MASS INDEX: 34.54 KG/M2 | SYSTOLIC BLOOD PRESSURE: 140 MMHG | WEIGHT: 255 LBS | TEMPERATURE: 97.7 F | HEART RATE: 59 BPM | HEIGHT: 72 IN | DIASTOLIC BLOOD PRESSURE: 90 MMHG

## 2020-07-11 PROCEDURE — U0003 INFECTIOUS AGENT DETECTION BY NUCLEIC ACID (DNA OR RNA); SEVERE ACUTE RESPIRATORY SYNDROME CORONAVIRUS 2 (SARS-COV-2) (CORONAVIRUS DISEASE [COVID-19]), AMPLIFIED PROBE TECHNIQUE, MAKING USE OF HIGH THROUGHPUT TECHNOLOGIES AS DESCRIBED BY CMS-2020-01-R: HCPCS

## 2020-07-11 PROCEDURE — 99213 OFFICE O/P EST LOW 20 MIN: CPT | Performed by: NURSE PRACTITIONER

## 2020-07-11 RX ORDER — ALBUTEROL SULFATE 90 UG/1
2 AEROSOL, METERED RESPIRATORY (INHALATION)
Qty: 1 INHALER | Refills: 0 | Status: SHIPPED
Start: 2020-07-11 | End: 2021-04-06 | Stop reason: SDUPTHER

## 2020-07-11 RX ORDER — BENZONATATE 100 MG/1
100 CAPSULE ORAL 3 TIMES DAILY PRN
Qty: 21 CAPSULE | Refills: 0 | Status: SHIPPED | OUTPATIENT
Start: 2020-07-11 | End: 2020-07-18

## 2020-07-11 NOTE — PROGRESS NOTES
Tyler Arriola  1970    Respiratory Symptoms:  Patient complains of 9 day(s) history of myalgias/arthralgias, headache, clear nasal discharge, sore throat, shortness of breath, wheezing/chest tightness, productive cough: Sputum is clear , nausea without vomiting, diarrhea and loss of taste and smell. Symptoms have been improving with time. She denies fever, ear symptoms, facial pain/sinus pressure and dizziness. Relevant PMH: HTN. Smoking history:  She  reports that she quit smoking about 9 years ago. Her smoking use included cigarettes. She smoked 0.25 packs per day. She has never used smokeless tobacco.     She has had ill contacts with COVID- Son tested positive. Treatment to date: Acetaminophen, NSAID, which has been  somewhat effective. Pertinent travel history:  None    Vitals:    07/11/20 0813   BP: (!) 140/90   Pulse: 59   Temp: 97.7 °F (36.5 °C)   TempSrc: Oral   Weight: 255 lb (115.7 kg)   Height: 6' (1.829 m)      Physical Exam  Vitals signs reviewed. Constitutional:       General: She is not in acute distress. Appearance: Normal appearance. She is well-developed. She is not toxic-appearing. HENT:      Head: Normocephalic and atraumatic. Right Ear: Hearing normal.      Left Ear: Hearing normal.      Nose: Nose normal.      Mouth/Throat:      Lips: Pink. Mouth: Mucous membranes are moist.      Pharynx: Oropharynx is clear. Uvula midline. Eyes:      General: Lids are normal.      Conjunctiva/sclera: Conjunctivae normal.      Pupils: Pupils are equal, round, and reactive to light. Neck:      Musculoskeletal: Normal range of motion. Trachea: Trachea normal.   Cardiovascular:      Rate and Rhythm: Normal rate and regular rhythm. Pulses: Normal pulses. Radial pulses are 2+ on the right side and 2+ on the left side. Heart sounds: Normal heart sounds. Pulmonary:      Effort: Pulmonary effort is normal. No respiratory distress.       Breath sounds: Normal breath sounds. No decreased breath sounds, wheezing, rhonchi or rales. Abdominal:      General: Abdomen is flat. Bowel sounds are normal.      Palpations: Abdomen is soft. Lymphadenopathy:      Cervical: No cervical adenopathy. Skin:     General: Skin is warm and dry. Capillary Refill: Capillary refill takes less than 2 seconds. Neurological:      Mental Status: She is alert and oriented to person, place, and time. Psychiatric:         Attention and Perception: Attention normal.         Mood and Affect: Mood and affect normal.         Speech: Speech normal.         Behavior: Behavior normal. Behavior is cooperative. Thought Content: Thought content normal.         Cognition and Memory: Cognition normal.         Judgment: Judgment normal.        Assessment/Plan:  1. Close exposure to COVID-19 virus  - COVID-19; Future  - Will advise patient of results once available  - Patient advised to continue strict self-quarantine at home for 14 days or until her test is negative and is fever free for 72 hours without medications  - If negative patient advised should be 7 days from symptom onset, have improving respiratory symptoms, and fever free for 72 hours without fever reducers prior to returning to public    2. Cough  - albuterol sulfate HFA (VENTOLIN HFA) 108 (90 Base) MCG/ACT inhaler; Inhale 2 puffs into the lungs every 4-6 hours as needed for Wheezing or Shortness of Breath  Dispense: 1 Inhaler; Refill: 0  - benzonatate (TESSALON) 100 MG capsule; Take 1 capsule by mouth 3 times daily as needed for Cough  Dispense: 21 capsule; Refill: 0    3. Generalized body aches  - Tylenol or Ibuprofen as needed    4. Disturbance of smell and taste    HANNA Smith - CNP  7/11/20     This visit was provided as a focused evaluation during the COVID -19 pandemic/national emergency. A comprehensive review of all previous patient history and testing was not conducted.   Pertinent findings were

## 2020-07-14 LAB — SARS-COV-2: NOT DETECTED

## 2020-09-10 RX ORDER — TRAZODONE HYDROCHLORIDE 150 MG/1
150 TABLET ORAL NIGHTLY
Qty: 90 TABLET | Refills: 1 | Status: SHIPPED
Start: 2020-09-10 | End: 2020-10-19 | Stop reason: SDUPTHER

## 2020-12-13 ENCOUNTER — OFFICE VISIT (OUTPATIENT)
Dept: PRIMARY CARE CLINIC | Age: 50
End: 2020-12-13
Payer: COMMERCIAL

## 2020-12-13 VITALS
BODY MASS INDEX: 33.86 KG/M2 | SYSTOLIC BLOOD PRESSURE: 140 MMHG | HEIGHT: 72 IN | OXYGEN SATURATION: 96 % | RESPIRATION RATE: 20 BRPM | HEART RATE: 63 BPM | TEMPERATURE: 98.1 F | DIASTOLIC BLOOD PRESSURE: 82 MMHG | WEIGHT: 250 LBS

## 2020-12-13 LAB
Lab: NORMAL
QC PASS/FAIL: NORMAL
SARS-COV-2, POC: DETECTED

## 2020-12-13 PROCEDURE — 87426 SARSCOV CORONAVIRUS AG IA: CPT | Performed by: FAMILY MEDICINE

## 2020-12-13 PROCEDURE — 99213 OFFICE O/P EST LOW 20 MIN: CPT | Performed by: FAMILY MEDICINE

## 2020-12-13 RX ORDER — AZITHROMYCIN 250 MG/1
250 TABLET, FILM COATED ORAL SEE ADMIN INSTRUCTIONS
Qty: 6 TABLET | Refills: 0 | Status: SHIPPED | OUTPATIENT
Start: 2020-12-13 | End: 2020-12-18

## 2020-12-13 ASSESSMENT — ENCOUNTER SYMPTOMS
EYES NEGATIVE: 1
ALLERGIC/IMMUNOLOGIC NEGATIVE: 1
GASTROINTESTINAL NEGATIVE: 1
RESPIRATORY NEGATIVE: 1

## 2020-12-13 NOTE — PROGRESS NOTES
20  Name: Popeye Escobedo    : 1970    Sex: female    Age: 53 y.o. Subjective:  Chief Complaint: Patient is here for ache  temp     Onset  This am       coivd pos--patient does not work in the medical field. 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 Review of Systems   Constitutional: Positive for fever. HENT: Negative. Eyes: Negative. Respiratory: Negative. Cardiovascular: Negative. Gastrointestinal: Negative. Endocrine: Negative. Genitourinary: Negative. Musculoskeletal: Negative. Skin: Negative. Allergic/Immunologic: Negative. Neurological: Negative. Hematological: Negative. Psychiatric/Behavioral: Negative.           Current Outpatient Medications:     azithromycin (ZITHROMAX) 250 MG tablet, Take 1 tablet by mouth See Admin Instructions for 5 days 500mg on day 1 followed by 250mg on days 2 - 5, Disp: 6 tablet, Rfl: 0    traZODone (DESYREL) 150 MG tablet, Take 1 tablet by mouth nightly, Disp: 90 tablet, Rfl: 3    metoprolol succinate (TOPROL XL) 50 MG extended release tablet, Take 0.5 tablets by mouth daily, Disp: 90 tablet, Rfl: 3    triamterene-hydrochlorothiazide (MAXZIDE-25) 37.5-25 MG per tablet, Take 1 tablet by mouth daily, Disp: 90 tablet, Rfl: 3    escitalopram (LEXAPRO) 20 MG tablet, Take 1 tablet by mouth daily, Disp: 90 tablet, Rfl: 3    Loratadine (CLARITIN PO), Take by mouth Indications: OTC, Disp: , Rfl:     albuterol sulfate HFA (VENTOLIN HFA) 108 (90 Base) MCG/ACT inhaler, Inhale 2 puffs into the lungs every 4-6 hours as needed for Wheezing or Shortness of Breath (Patient not taking: Reported on 2020), Disp: 1 Inhaler, Rfl: 0    ondansetron (ZOFRAN-ODT) 4 MG disintegrating tablet, dissolve 1 tablet ON TONGUE twice a day if needed for nausea, Disp: , Rfl:     fluticasone (FLONASE) 50 MCG/ACT nasal spray, 2 sprays by Nasal route daily (Patient not taking: Reported on 2020), Disp: 1 Bottle, Rfl: 5   rizatriptan (MAXALT-MLT) 5 MG disintegrating tablet, Take 1 tablet by mouth as needed for Migraine (Patient not taking: Reported on 2020), Disp: 30 tablet, Rfl: 2    butalbital-APAP-caffeine (FIORICET) -40 MG CAPS per capsule, Take 1 capsule by mouth every 4 hours as needed for Headaches (Patient not taking: Reported on 2020), Disp: 84 capsule, Rfl: 0  Allergies   Allergen Reactions    Ace Inhibitors Hives and Itching    Lisinopril Itching    Cat Hair Extract      Congested     Social History     Socioeconomic History    Marital status:      Spouse name: Not on file    Number of children: Not on file    Years of education: Not on file    Highest education level: Not on file   Occupational History    Not on file   Social Needs    Financial resource strain: Not on file    Food insecurity     Worry: Not on file     Inability: Not on file    Transportation needs     Medical: Not on file     Non-medical: Not on file   Tobacco Use    Smoking status: Former Smoker     Packs/day: 0.25     Types: Cigarettes     Quit date: 4/15/2011     Years since quittin.6    Smokeless tobacco: Never Used   Substance and Sexual Activity    Alcohol use:  Yes     Alcohol/week: 5.0 - 7.0 standard drinks     Types: 5 - 7 Glasses of wine per week    Drug use: No    Sexual activity: Yes     Partners: Male     Comment: (11/3/15):  ; s/p vasectomy   Lifestyle    Physical activity     Days per week: Not on file     Minutes per session: Not on file    Stress: Not on file   Relationships    Social connections     Talks on phone: Not on file     Gets together: Not on file     Attends [employer] service: Not on file     Active member of club or organization: Not on file     Attends meetings of clubs or organizations: Not on file     Relationship status: Not on file    Intimate partner violence     Fear of current or ex partner: Not on file     Emotionally abused: Not on file Physically abused: Not on file     Forced sexual activity: Not on file   Other Topics Concern    Not on file   Social History Narrative    Not on file      Past Medical History:   Diagnosis Date    ADHD (attention deficit hyperactivity disorder)     Fear of travel with panic attacks     Hypertension     Right ankle injury 4/2015     Family History   Problem Relation Age of Onset    High Blood Pressure Mother     Colon Polyps Mother     High Blood Pressure Father     Arrhythmia Father         A-fib; S/P ablation    Atrial Fibrillation Father     ADHD Son     Alcohol Abuse Maternal Grandfather     Alcohol Abuse Paternal Grandfather       Past Surgical History:   Procedure Laterality Date    CHOLECYSTECTOMY  2003    OVARIAN CYST SURGERY  1990    HI COLONOSCOPY W/BIOPSY SINGLE/MULTIPLE N/A 12/3/2018    COLONOSCOPY WITH BIOPSY performed by Homer Vicente MD at [address]:    12/13/20 0911   BP: (!) 140/82   Site: Left Upper Arm   Position: Sitting   Cuff Size: Medium Adult   Pulse: 63   Resp: 20   Temp: 98.1 °F (36.7 °C)   TempSrc: Oral   SpO2: 96%   Weight: 250 lb (113.4 kg)   Height: 6' (1.829 m)       Objective:    Physical Exam  Vitals signs reviewed. Constitutional:       Appearance: She is well-developed. HENT:      Head: Normocephalic. Eyes:      Pupils: Pupils are equal, round, and reactive to light. Neck:      Musculoskeletal: Normal range of motion. Cardiovascular:      Rate and Rhythm: Normal rate and regular rhythm. Pulmonary:      Effort: Pulmonary effort is normal.      Breath sounds: Normal breath sounds. Abdominal:      Palpations: Abdomen is soft. Musculoskeletal: Normal range of motion. Skin:     General: Skin is warm. Neurological:      Mental Status: She is alert and oriented to person, place, and time. Psychiatric:         Behavior: Behavior normal.         Joshua Leonardo was seen today for headache, fever and generalized body aches.

## 2020-12-29 RX ORDER — ESCITALOPRAM OXALATE 20 MG/1
20 TABLET ORAL DAILY
Qty: 90 TABLET | Refills: 3 | OUTPATIENT
Start: 2020-12-29

## 2020-12-31 RX ORDER — ESCITALOPRAM OXALATE 20 MG/1
TABLET ORAL
Qty: 45 TABLET | Refills: 0 | Status: SHIPPED
Start: 2020-12-31 | End: 2021-01-06 | Stop reason: SDUPTHER

## 2020-12-31 NOTE — PROGRESS NOTES
Mariam Briceno is a 53 y.o. female who presents today for     Chief Complaint   Patient presents with    Discuss Medications    Other     12/13/2020 positive covid test. very tired. no fever now        MY CHART MESSAGE 3/28/2020: I have slight tightness in my chest and a bit of a cough, and I tire easily. Like many people, I'm sure, I have a lot of anxiety. I'm not sure  it's appropriate, but I have increased my Lexapro to 1.5 tablets (30mg.) Any advice/suggestions would be appreciated. Current status:  She is exhausted. In addition to stress and depression, she tested positive for COVID 12/13/2020. In addition to fatigue, her COVID symptoms have included loss of taste and smell (which is recovering) and persistent diarrhea--2 to 3 stools a day. Denies current F/C; admits to [facility]    Hypertension:  Patient is here for follow up chronic hypertension. This is not generally controlled on current medication regimen (currently on Maxzide 25 1 1/2 tablets/day and metoprolol succinate [Toprol XL] 25 mg/day). BP today is 136/90; she reports that it has been somewhat elevated at home recently. Takes meds as directed and tolerates them well. Symptoms at this time include headaches as above. Fair lifestyle compliance between presence of headaches and pandemic. Patient does not smoke. Comorbid conditions include stress/anxiety without medication treatment. She is due for medication refills.     Anxiety and/or depression: This is a/an chronic problem. This has been going on for greater than 1 year. Exacerbating factors include concerns about the well being of her family (especially her college aged son), headache symptoms, and recent pandemic. Alleviating factors include compliance with medication as prescribed. Treatment in the past includes escitalopram, bupropion. Anxiety symptoms include palpitations, anxious mood. Depressive symptoms include depressed mood, anhedonia, fatigue. Patient does not have suicidal or homicidal ideation. Patient is not having issues falling or staying asleep. Patient is not having associated panic attacks. The above is not interfering with quality of life. Patient has seen a Counselor before. Patient does not use alcohol and/or drugs. Family history includes N/A. Currently she reports that she is stable and well controlled with escitalopram 20 mg/day. She tolerates this well and denies significant side effects. She is due for medication refill      [address]:  Patient's past medical, surgical, social and/or family history reviewed, updated in chart, and are non-contributory (unless otherwise stated). Medications and allergies also reviewed and updated in chart. Review of Systems  Review of Systems   HENT: Negative for congestion, ear pain and sore throat. Respiratory: Negative for cough, shortness of breath and wheezing. Cardiovascular: Negative for chest pain, palpitations and leg swelling. Gastrointestinal: Negative for abdominal pain, blood in stool, constipation, diarrhea, nausea and vomiting. Genitourinary: Negative for dysuria, frequency, hematuria and urgency. Musculoskeletal: Negative for back pain, myalgias and neck pain. Skin: Negative for rash. Neurological: Positive for headaches. Negative for dizziness and weakness. Psychiatric/Behavioral: The patient is not nervous/anxious.         Physical Exam: VS:  /88   Pulse 60   Temp 96.6 °F (35.9 °C) (Oral)   Resp 18   Ht 6' (1.829 m)   Wt 261 lb 4.8 oz (118.5 kg)   LMP 05/01/2017   SpO2 98%   BMI 35.44 kg/m²     LAST WEIGHT:  Wt Readings from Last 3 Encounters:   01/06/21 261 lb 4.8 oz (118.5 kg)   12/13/20 250 lb (113.4 kg)   07/11/20 255 lb (115.7 kg)       BMI Readings from Last 3 Encounters:   01/06/21 35.44 kg/m²   12/13/20 33.91 kg/m²   07/11/20 34.58 kg/m²       Physical Exam  Constitutional:       General: She is not in acute distress. Appearance: She is well-developed. She is not diaphoretic. HENT:      Head: Normocephalic and atraumatic. Comments: Reproducible tightness and tension from occipital skull into occiput and into neck and shoulders/trapezii bilateral     Right Ear: External ear normal.      Left Ear: External ear normal.      Nose: Nose normal.      Right Sinus: No maxillary sinus tenderness or frontal sinus tenderness. Left Sinus: No maxillary sinus tenderness or frontal sinus tenderness. Mouth/Throat:      Mouth: Mucous membranes are moist.      Pharynx: Oropharynx is clear. Uvula midline. No pharyngeal swelling, oropharyngeal exudate, posterior oropharyngeal erythema or uvula swelling. Eyes:      General: No scleral icterus. Right eye: No discharge. Conjunctiva/sclera: Conjunctivae normal.      Pupils: Pupils are equal, round, and reactive to light. Neck:      Musculoskeletal: Normal range of motion and neck supple. Thyroid: No thyromegaly. Cardiovascular:      Rate and Rhythm: Normal rate and regular rhythm. Heart sounds: Normal heart sounds. No murmur. Pulmonary:      Effort: Pulmonary effort is normal. No respiratory distress. Breath sounds: No stridor. No wheezing or rales. Chest:      Chest wall: No tenderness. Abdominal:      General: Bowel sounds are normal. There is no distension. Palpations: Abdomen is soft. There is no mass. Tenderness: There is no abdominal tenderness. There is no guarding. Musculoskeletal: Normal range of motion. General: No tenderness. Lymphadenopathy:      Cervical: No cervical adenopathy. Skin:     General: Skin is warm and dry. Coloration: Skin is not pale. Findings: No erythema or rash. Neurological:      Mental Status: She is alert and oriented to person, place, and time. Psychiatric:         Behavior: Behavior normal.         Thought Content: Thought content normal.         Labs:  Lab Results   Component Value Date     11/19/2018    K 4.3 11/19/2018     11/19/2018    CO2 28 11/19/2018    BUN 15 11/19/2018    CREATININE 1.2 11/19/2018    PROT 6.9 11/19/2018    LABALBU 4.2 11/19/2018    CALCIUM 9.6 11/19/2018    GFRAA 58 11/19/2018    LABGLOM 48 11/19/2018    GLUCOSE 103 11/19/2018    AST 20 11/19/2018    ALT 17 11/19/2018    ALKPHOS 46 11/19/2018    BILITOT 0.5 11/19/2018    TSH 0.751 04/16/2019    CHOL 198 11/19/2018    TRIG 220 11/19/2018    HDL 44 11/19/2018    LDLCALC 110 11/19/2018        Lab Results   Component Value Date    CHOL 198 11/19/2018    CHOL 203 (H) 08/22/2018    CHOL 204 (H) 11/03/2015     Lab Results   Component Value Date    TRIG 220 (H) 11/19/2018    TRIG 171 (H) 08/22/2018    TRIG 259 (H) 11/03/2015     Lab Results   Component Value Date    HDL 44 11/19/2018    HDL 47 08/22/2018    HDL 50 11/03/2015     Lab Results   Component Value Date    LDLCALC 110 (H) 11/19/2018    LDLCALC 122 (H) 08/22/2018    LDLCALC 102 (H) 11/03/2015       No results found for: LABA1C  Lab Results   Component Value Date    LDLCALC 110 (H) 11/19/2018    CREATININE 1.2 (H) 11/19/2018         Assessment / Plan:      Natali Sánchez was seen today for discuss medications and other. Diagnoses and all orders for this visit:    COVID-19 virus infection: Documented history in 12/2020; still having symptoms   -     COVID-19 Ambulatory; Future  -     XR CHEST (2 VW);  Future -     CBC Auto Differential; Future  -     Comprehensive Metabolic Panel; Future    Shortness of breath: recent COVID infection  -     XR CHEST (2 VW); Future    Anxiety and depression:Due for medication refill  -     escitalopram (LEXAPRO) 20 MG tablet; Take 1 1/2 tablets daily        Follow Up:  No follow-ups on file. or sooner if necessary. Call or go to ED immediately if symptoms worsen or persist.    Educational materials and/or home exercises printed for patient's review and were included in patient instructions on his/her AfterVisit Summary and given to patient at the end of visit. Counseled regarding above diagnosis,including possible risks and complications,  especially if left uncontrolled. Counseled regarding the possible side effects, risks, benefits and alternatives to treatment; patient and/or guardian verbalizes understanding, agrees, feels comfortable with and wishes to proceed with above treatment plan. Advised patient tocall with any new medication issues, and read all Rx info from pharmacy to assureaware of all possible risks and side effects of medication before taking. Reviewed age and gender appropriate health screening exams and vaccinations. Advisedpatient regarding importance of keeping up with recommended health maintenance andto schedule as soon as possible if overdue, as this is important in assessing forundiagnosed pathology, especially cancer, as well as protecting against potentially harmful/life threatening disease. Patient and/or guardian verbalizes understandingand agrees with above counseling, assessment and plan. All questions answered.     Jose Arreola MD

## 2021-01-06 ENCOUNTER — HOSPITAL ENCOUNTER (OUTPATIENT)
Dept: GENERAL RADIOLOGY | Age: 51
Discharge: HOME | End: 2021-01-08
Payer: COMMERCIAL

## 2021-01-06 ENCOUNTER — HOSPITAL ENCOUNTER (OUTPATIENT)
Age: 51
Discharge: HOME | End: 2021-01-08
Payer: COMMERCIAL

## 2021-01-06 ENCOUNTER — HOSPITAL ENCOUNTER (OUTPATIENT)
Age: 51
Discharge: HOME | End: 2021-01-06
Payer: COMMERCIAL

## 2021-01-06 ENCOUNTER — OFFICE VISIT (OUTPATIENT)
Dept: FAMILY MEDICINE CLINIC | Age: 51
End: 2021-01-06
Payer: COMMERCIAL

## 2021-01-06 VITALS
BODY MASS INDEX: 35.39 KG/M2 | DIASTOLIC BLOOD PRESSURE: 88 MMHG | HEART RATE: 60 BPM | SYSTOLIC BLOOD PRESSURE: 130 MMHG | HEIGHT: 72 IN | WEIGHT: 261.3 LBS | RESPIRATION RATE: 18 BRPM | OXYGEN SATURATION: 98 % | TEMPERATURE: 96.6 F

## 2021-01-06 DIAGNOSIS — F32.A ANXIETY AND DEPRESSION: ICD-10-CM

## 2021-01-06 DIAGNOSIS — U07.1 COVID-19 VIRUS INFECTION: ICD-10-CM

## 2021-01-06 DIAGNOSIS — F41.9 ANXIETY AND DEPRESSION: ICD-10-CM

## 2021-01-06 DIAGNOSIS — U07.1 COVID-19 VIRUS INFECTION: Primary | ICD-10-CM

## 2021-01-06 DIAGNOSIS — R06.02 SHORTNESS OF BREATH: ICD-10-CM

## 2021-01-06 LAB
ALBUMIN SERPL-MCNC: 4.3 G/DL (ref 3.5–5.2)
ALP BLD-CCNC: 62 U/L (ref 35–104)
ALT SERPL-CCNC: 33 U/L (ref 0–32)
ANION GAP SERPL CALCULATED.3IONS-SCNC: 9 MMOL/L (ref 7–16)
AST SERPL-CCNC: 28 U/L (ref 0–31)
BASOPHILS ABSOLUTE: 0.05 E9/L (ref 0–0.2)
BASOPHILS RELATIVE PERCENT: 0.8 % (ref 0–2)
BILIRUB SERPL-MCNC: 0.4 MG/DL (ref 0–1.2)
BUN BLDV-MCNC: 15 MG/DL (ref 6–20)
CALCIUM SERPL-MCNC: 8.2 MG/DL (ref 8.6–10.2)
CHLORIDE BLD-SCNC: 102 MMOL/L (ref 98–107)
CO2: 28 MMOL/L (ref 22–29)
CREAT SERPL-MCNC: 1.2 MG/DL (ref 0.5–1)
EOSINOPHILS ABSOLUTE: 0.17 E9/L (ref 0.05–0.5)
EOSINOPHILS RELATIVE PERCENT: 2.8 % (ref 0–6)
GFR AFRICAN AMERICAN: 58
GFR NON-AFRICAN AMERICAN: 48 ML/MIN/1.73
GLUCOSE BLD-MCNC: 105 MG/DL (ref 74–99)
HCT VFR BLD CALC: 41.7 % (ref 34–48)
HEMOGLOBIN: 14.5 G/DL (ref 11.5–15.5)
IMMATURE GRANULOCYTES #: 0.02 E9/L
IMMATURE GRANULOCYTES %: 0.3 % (ref 0–5)
LYMPHOCYTES ABSOLUTE: 2.63 E9/L (ref 1.5–4)
LYMPHOCYTES RELATIVE PERCENT: 44.1 % (ref 20–42)
MCH RBC QN AUTO: 30.8 PG (ref 26–35)
MCHC RBC AUTO-ENTMCNC: 34.8 % (ref 32–34.5)
MCV RBC AUTO: 88.5 FL (ref 80–99.9)
MONOCYTES ABSOLUTE: 0.42 E9/L (ref 0.1–0.95)
MONOCYTES RELATIVE PERCENT: 7 % (ref 2–12)
NEUTROPHILS ABSOLUTE: 2.68 E9/L (ref 1.8–7.3)
NEUTROPHILS RELATIVE PERCENT: 45 % (ref 43–80)
PDW BLD-RTO: 12.4 FL (ref 11.5–15)
PLATELET # BLD: 288 E9/L (ref 130–450)
PMV BLD AUTO: 10 FL (ref 7–12)
POTASSIUM SERPL-SCNC: 4.1 MMOL/L (ref 3.5–5)
RBC # BLD: 4.71 E12/L (ref 3.5–5.5)
SODIUM BLD-SCNC: 139 MMOL/L (ref 132–146)
TOTAL PROTEIN: 7.8 G/DL (ref 6.4–8.3)
WBC # BLD: 6 E9/L (ref 4.5–11.5)

## 2021-01-06 PROCEDURE — 71046 X-RAY EXAM CHEST 2 VIEWS: CPT

## 2021-01-06 PROCEDURE — 99213 OFFICE O/P EST LOW 20 MIN: CPT | Performed by: FAMILY MEDICINE

## 2021-01-06 PROCEDURE — 85025 COMPLETE CBC W/AUTO DIFF WBC: CPT

## 2021-01-06 PROCEDURE — 36415 COLL VENOUS BLD VENIPUNCTURE: CPT

## 2021-01-06 PROCEDURE — 80053 COMPREHEN METABOLIC PANEL: CPT

## 2021-01-06 RX ORDER — ESCITALOPRAM OXALATE 20 MG/1
TABLET ORAL
Qty: 135 TABLET | Refills: 3 | Status: SHIPPED
Start: 2021-01-06 | End: 2021-04-06 | Stop reason: SDUPTHER

## 2021-01-06 ASSESSMENT — COLUMBIA-SUICIDE SEVERITY RATING SCALE - C-SSRS
1. WITHIN THE PAST MONTH, HAVE YOU WISHED YOU WERE DEAD OR WISHED YOU COULD GO TO SLEEP AND NOT WAKE UP?: NO
2. HAVE YOU ACTUALLY HAD ANY THOUGHTS OF KILLING YOURSELF?: NO
6. HAVE YOU EVER DONE ANYTHING, STARTED TO DO ANYTHING, OR PREPARED TO DO ANYTHING TO END YOUR LIFE?: NO

## 2021-01-06 ASSESSMENT — PATIENT HEALTH QUESTIONNAIRE - PHQ9
8. MOVING OR SPEAKING SO SLOWLY THAT OTHER PEOPLE COULD HAVE NOTICED. OR THE OPPOSITE, BEING SO FIGETY OR RESTLESS THAT YOU HAVE BEEN MOVING AROUND A LOT MORE THAN USUAL: 0
5. POOR APPETITE OR OVEREATING: 0
9. THOUGHTS THAT YOU WOULD BE BETTER OFF DEAD, OR OF HURTING YOURSELF: 0
SUM OF ALL RESPONSES TO PHQ QUESTIONS 1-9: 13
SUM OF ALL RESPONSES TO PHQ QUESTIONS 1-9: 13
SUM OF ALL RESPONSES TO PHQ9 QUESTIONS 1 & 2: 6
1. LITTLE INTEREST OR PLEASURE IN DOING THINGS: 3
SUM OF ALL RESPONSES TO PHQ QUESTIONS 1-9: 13

## 2021-01-07 LAB — SARS-COV-2, PCR: NOT DETECTED

## 2021-03-31 ASSESSMENT — ENCOUNTER SYMPTOMS
BACK PAIN: 0
WHEEZING: 0
CONSTIPATION: 0
DIARRHEA: 0
SHORTNESS OF BREATH: 0
ABDOMINAL PAIN: 0
BLOOD IN STOOL: 0
SORE THROAT: 0
COUGH: 0
VOMITING: 0
NAUSEA: 0

## 2021-03-31 NOTE — PROGRESS NOTES
is not having issues falling or staying asleep. Patient is not having associated panic attacks. The above is not interfering with quality of life. Patient has seen a Counselor before. Patient does not use alcohol and/or drugs. Family history includes N/A. Currently she reports that she is stable and well controlled with escitalopram 20 mg/day. She tolerates this well and denies significant side effects. [address]:  Patient's past medical, surgical, social and/or family history reviewed, updated in chart, and are non-contributory (unless otherwise stated). Medications and allergies also reviewed and updated in chart. Review of Systems  Review of Systems   HENT: Negative for congestion, ear pain and sore throat. Respiratory: Negative for cough, shortness of breath and wheezing. Cardiovascular: Negative for chest pain, palpitations and leg swelling. Gastrointestinal: Negative for abdominal pain, blood in stool, constipation, diarrhea, nausea and vomiting. Genitourinary: Negative for dysuria, frequency, hematuria and urgency. Musculoskeletal: Negative for back pain, myalgias and neck pain. Skin: Negative for rash. Neurological: Positive for headaches. Negative for dizziness and weakness. Psychiatric/Behavioral: The patient is not nervous/anxious. Physical Exam:    VS:  /82   Pulse 69   Temp 96.9 °F (36.1 °C) (Infrared)   Resp 18   Ht 6' (1.829 m)   Wt 260 lb (117.9 kg)   LMP 05/01/2017   SpO2 96%   BMI 35.26 kg/m²     LAST WEIGHT:  Wt Readings from Last 3 Encounters:   04/06/21 260 lb (117.9 kg)   01/06/21 261 lb 4.8 oz (118.5 kg)   12/13/20 250 lb (113.4 kg)       BMI Readings from Last 3 Encounters:   04/06/21 35.26 kg/m²   01/06/21 35.44 kg/m²   12/13/20 33.91 kg/m²       Physical Exam  Constitutional:       General: She is not in acute distress. Appearance: She is well-developed. She is not diaphoretic. HENT:      Head: Normocephalic and atraumatic. Right Ear: External ear normal.      Left Ear: External ear normal.      Nose: Nose normal.      Right Sinus: No maxillary sinus tenderness or frontal sinus tenderness. Left Sinus: No maxillary sinus tenderness or frontal sinus tenderness. Mouth/Throat:      Mouth: Mucous membranes are moist.      Pharynx: Oropharynx is clear. Uvula midline. No pharyngeal swelling, oropharyngeal exudate, posterior oropharyngeal erythema or uvula swelling. Eyes:      General: No scleral icterus. Right eye: No discharge. Conjunctiva/sclera: Conjunctivae normal.      Pupils: Pupils are equal, round, and reactive to light. Neck:      Musculoskeletal: Normal range of motion and neck supple. Thyroid: No thyromegaly. Cardiovascular:      Rate and Rhythm: Normal rate and regular rhythm. Heart sounds: Normal heart sounds. No murmur. Pulmonary:      Effort: Pulmonary effort is normal. No respiratory distress. Breath sounds: No stridor. No wheezing or rales. Chest:      Chest wall: No tenderness. Abdominal:      General: Bowel sounds are normal. There is no distension. Palpations: Abdomen is soft. There is no mass. Tenderness: There is no abdominal tenderness. There is no guarding. Musculoskeletal: Normal range of motion. General: No tenderness. Lymphadenopathy:      Cervical: No cervical adenopathy. Skin:     General: Skin is warm and dry. Coloration: Skin is not pale. Findings: No erythema or rash. Neurological:      Mental Status: She is alert and oriented to person, place, and time. Psychiatric:         Behavior: Behavior normal.         Thought Content:  Thought content normal.         Labs:  Lab Results   Component Value Date     01/06/2021    K 4.1 01/06/2021     01/06/2021    CO2 28 01/06/2021    BUN 15 01/06/2021    CREATININE 1.2 01/06/2021    PROT 7.8 01/06/2021    LABALBU 4.3 01/06/2021    CALCIUM 8.2 01/06/2021    GFRAA 58 01/06/2021 1 1/2 tablets daily    Vitamin D insufficiency  -     Vitamin D 25 Hydroxy; Future  -     vitamin D3 (CHOLECALCIFEROL) 25 MCG (1000 UT) TABS tablet; Take 1 tablet by mouth daily    Paresthesia  -     Vitamin B12 & Folate; Future  -     Consider prescribing trial of gabapentin is B12/Folate are WDL      ADDENDUM:    Lab Results   Component Value Date    [id number] 04/06/2021     Vitamin B12/Folate are within desired limits. Will prescribe trial of gabapentin 100 mg QHS and F/U in 6 weeks (F2F or Vv)    Time spent in pre-visit planning, interview, exam, /education and coordination of care: 30 minutes        Follow Up:  Return for F/U 3 mos check up; fasting labs 1 week prior. or sooner if necessary. Call or go to ED immediately if symptoms worsen or persist.    Educational materials and/or home exercises printed for patient's review and were included in patient instructions on his/her AfterVisit Summary and given to patient at the end of visit. Counseled regarding above diagnosis,including possible risks and complications,  especially if left uncontrolled. Counseled regarding the possible side effects, risks, benefits and alternatives to treatment; patient and/or guardian verbalizes understanding, agrees, feels comfortable with and wishes to proceed with above treatment plan. Advised patient tocall with any new medication issues, and read all Rx info from pharmacy to assureaware of all possible risks and side effects of medication before taking. Reviewed age and gender appropriate health screening exams and vaccinations. Advisedpatient regarding importance of keeping up with recommended health maintenance andto schedule as soon as possible if overdue, as this is important in assessing forundiagnosed pathology, especially cancer, as well as protecting against potentially harmful/life threatening disease.       Patient and/or guardian verbalizes understandingand agrees with above counseling, assessment and plan. All questions answered.     Randal Huerta MD

## 2021-04-06 ENCOUNTER — OFFICE VISIT (OUTPATIENT)
Dept: FAMILY MEDICINE CLINIC | Age: 51
End: 2021-04-06
Payer: COMMERCIAL

## 2021-04-06 VITALS
BODY MASS INDEX: 35.21 KG/M2 | TEMPERATURE: 96.9 F | RESPIRATION RATE: 18 BRPM | DIASTOLIC BLOOD PRESSURE: 82 MMHG | HEART RATE: 69 BPM | SYSTOLIC BLOOD PRESSURE: 128 MMHG | WEIGHT: 260 LBS | HEIGHT: 72 IN | OXYGEN SATURATION: 96 %

## 2021-04-06 DIAGNOSIS — Z12.31 ENCOUNTER FOR SCREENING MAMMOGRAM FOR MALIGNANT NEOPLASM OF BREAST: Primary | ICD-10-CM

## 2021-04-06 DIAGNOSIS — F41.9 ANXIETY AND DEPRESSION: ICD-10-CM

## 2021-04-06 DIAGNOSIS — R05.9 COUGH: ICD-10-CM

## 2021-04-06 DIAGNOSIS — I10 ESSENTIAL HYPERTENSION: ICD-10-CM

## 2021-04-06 DIAGNOSIS — E55.9 VITAMIN D INSUFFICIENCY: ICD-10-CM

## 2021-04-06 DIAGNOSIS — R20.2 PARESTHESIA: ICD-10-CM

## 2021-04-06 DIAGNOSIS — F32.A ANXIETY AND DEPRESSION: ICD-10-CM

## 2021-04-06 DIAGNOSIS — J30.81 ALLERGIC RHINITIS DUE TO ANIMAL HAIR AND DANDER: ICD-10-CM

## 2021-04-06 LAB
FOLATE: 17.9 NG/ML (ref 4.8–24.2)
VITAMIN B-12: 388 PG/ML (ref 211–946)

## 2021-04-06 PROCEDURE — 99214 OFFICE O/P EST MOD 30 MIN: CPT | Performed by: FAMILY MEDICINE

## 2021-04-06 RX ORDER — ESCITALOPRAM OXALATE 20 MG/1
TABLET ORAL
Qty: 135 TABLET | Refills: 3 | Status: SHIPPED
Start: 2021-04-06 | End: 2022-05-02 | Stop reason: SDUPTHER

## 2021-04-06 RX ORDER — TRAZODONE HYDROCHLORIDE 150 MG/1
150 TABLET ORAL NIGHTLY
Qty: 90 TABLET | Refills: 3 | Status: SHIPPED
Start: 2021-04-06 | End: 2022-05-02 | Stop reason: SDUPTHER

## 2021-04-06 RX ORDER — FLUTICASONE PROPIONATE 50 MCG
2 SPRAY, SUSPENSION (ML) NASAL DAILY
Qty: 3 BOTTLE | Refills: 3 | Status: SHIPPED | OUTPATIENT
Start: 2021-04-06 | End: 2022-04-06

## 2021-04-06 RX ORDER — MELATONIN
1000 DAILY
Qty: 90 TABLET | Refills: 3 | COMMUNITY
Start: 2021-04-06 | End: 2021-07-06 | Stop reason: SDUPTHER

## 2021-04-06 RX ORDER — TRIAMTERENE AND HYDROCHLOROTHIAZIDE 37.5; 25 MG/1; MG/1
1 TABLET ORAL DAILY
Qty: 90 TABLET | Refills: 3 | Status: SHIPPED
Start: 2021-04-06 | End: 2022-03-29 | Stop reason: SDUPTHER

## 2021-04-06 RX ORDER — METOPROLOL SUCCINATE 50 MG/1
25 TABLET, EXTENDED RELEASE ORAL DAILY
Qty: 90 TABLET | Refills: 3 | Status: SHIPPED
Start: 2021-04-06 | End: 2022-06-06 | Stop reason: SDUPTHER

## 2021-04-06 RX ORDER — ALBUTEROL SULFATE 90 UG/1
2 AEROSOL, METERED RESPIRATORY (INHALATION)
Qty: 1 INHALER | Refills: 2 | Status: SHIPPED
Start: 2021-04-06 | End: 2022-05-02 | Stop reason: SDUPTHER

## 2021-04-06 NOTE — PATIENT INSTRUCTIONS
Patient Education        Numbness and Tingling: Care Instructions  Your Care Instructions     Many things can cause numbness or tingling. Swelling may put pressure on a nerve. This could cause you to lose feeling or have a pins-and-needles sensation on part of your body. Nerves may be damaged from trauma, toxins, or diseases, such as diabetes or multiple sclerosis (MS). Sometimes, though, the cause is not clear. If there is no clear reason for your symptoms, and you are not having any other symptoms, your doctor may suggest watching and waiting for a while to see if the numbness or tingling goes away on its own. Your doctor may want you to have blood or nerve tests to find the cause of your symptoms. Follow-up care is a key part of your treatment and safety. Be sure to make and go to all appointments, and call your doctor if you are having problems. It's also a good idea to know your test results and keep a list of the medicines you take. How can you care for yourself at home? · If your doctor prescribes medicine, take it exactly as directed. Call your doctor if you think you are having a problem with your medicine. · If you have any swelling, put ice or a cold pack on the area for 10 to 20 minutes at a time. Put a thin cloth between the ice and your skin. When should you call for help? Call 911 anytime you think you may need emergency care. For example, call if:    · You have weakness, numbness, or tingling in both legs.     · You lose bowel or bladder control.     · You have symptoms of a stroke. These may include:  ? Sudden numbness, tingling, weakness, or loss of movement in your face, arm, or leg, especially on only one side of your body. ? Sudden vision changes. ? Sudden trouble speaking. ? Sudden confusion or trouble understanding simple statements. ? Sudden problems with walking or balance. ? A sudden, severe headache that is different from past headaches.    Watch closely for changes in your health, and be sure to contact your doctor if you have any problems, or if:    · You do not get better as expected. Where can you learn more? Go to [email or web address] org and sign in to your [employer] account. Enter E564 in the [facility] box to learn more about \"Numbness and Tingling: Care Instructions. \"     If you do not have an account, please click on the \"Sign Up Now\" link. Current as of: August 4, 2020               Content Version: 12.8  © 1449-9970 [employer]. Care instructions adapted under license by [facility] ([facility]). If you have questions about a medical condition or this instruction, always ask your healthcare professional. [address] any warranty or liability for your use of this information. Patient Education        gabapentin  Pronunciation:  GA ba PEN tin  Brand:  Gralise, Horizant, Neurontin  What is the most important information I should know about gabapentin? Gabapentin can cause life-threatening breathing problems, especially in older adults or people with COPD. Seek emergency medical attention if you have very slow breathing. Some people have thoughts about suicide or behavior changes while taking gabapentin. Stay alert to changes in your mood or symptoms. Report any new or worsening symptoms to your doctor. Avoid driving or hazardous activity until you know how this medicine will affect you. Dizziness or drowsiness can cause falls, accidents, or severe injuries. Do not stop using gabapentin suddenly, even if you feel fine. What is gabapentin? Gabapentin is used together with other medicines to treat partial seizures in adults and children at least 1years old. Gabapentin is also used to treat nerve pain caused by herpes virus or shingles (herpes zoster) in adults. Use only the brand and form of gabapentin your doctor has prescribed.   Check your medicine each time you get a refill to make sure you receive the taken during the day. For best results, take Horizant with food at about 5:00 in the evening. Both Gralise and Horizant should be taken with food. Neurontin can be taken with or without food. If you break a Neurontin tablet and take only half of it, take the other half at your next dose. Any tablet that has been broken should be used as soon as possible or within a few days. Swallow the capsule  or tablet  whole and do not crush, chew, break, or open it. Measure liquid medicine carefully. Use the dosing syringe provided, or use a medicine dose-measuring device (not a kitchen spoon). Do not stop using gabapentin suddenly, even if you feel fine. Stopping suddenly may cause increased seizures. Follow your doctor's instructions about tapering your dose. In case of emergency, wear or carry medical identification to let others know you have seizures. This medicine can cause unusual results with certain medical tests. Tell any doctor who treats you that you are using gabapentin. Store gabapentin tablets and capsules at room temperature away from light and moisture. Store the liquid medicine in the refrigerator. Do not freeze. What happens if I miss a dose? Take the medicine as soon as you can, but skip the missed dose if it is almost time for your next dose. Do not take two doses at one time. If you take Horizant:  Skip the missed dose and use your next dose at the regular time. Do not use two doses of Horizant at one time. What happens if I overdose? Seek emergency medical attention or call the Poison Help line at 1-410.938.7488. What should I avoid while taking gabapentin? Avoid driving or hazardous activity until you know how this medicine will affect you. Your reactions could be impaired. Dizziness or drowsiness can cause falls, accidents, or severe injuries. Avoid taking an antacid within 2 hours before or after you take gabapentin. Antacids can make it harder for your body to absorb gabapentin. effects. You may report side effects to FDA at 2-511-FDA-9736. What other drugs will affect gabapentin? Using gabapentin with other drugs that slow your breathing can cause dangerous side effects or death. Ask your doctor before using opioid medication, a sleeping pill, cold or allergy medicine, a muscle relaxer, or medicine for anxiety or seizures. Other drugs may affect gabapentin, including prescription and over-the-counter medicines, vitamins, and herbal products. Tell your doctor about all your current medicines and any medicine you start or stop using. Where can I get more information? Your pharmacist can provide more information about gabapentin. Remember, keep this and all other medicines out of the reach of children, never share your medicines with others, and use this medication only for the indication prescribed. Every effort has been made to ensure that the information provided by [address] is accurate, up-to-date, and complete, but no guarantee is made to that effect. Drug information contained herein may be time sensitive. [facility] information has been compiled for use by healthcare practitioners and consumers in the [facility] and therefore [employer] does not warrant that uses outside of the [facility] are appropriate, unless specifically indicated otherwise. [facility]'s drug information does not endorse drugs, diagnose patients or recommend therapy. [employer] drug information is an informational resource designed to assist licensed healthcare practitioners in caring for their patients and/or to serve consumers viewing this service as a supplement to, and not a substitute for, the expertise, skill, knowledge and judgment of healthcare practitioners. The absence of a warning for a given drug or drug combination in no way should be construed to indicate that the drug or drug combination is safe, effective or appropriate for any given patient.  [employer] does not assume any responsibility for any aspect of healthcare administered with the aid of information [facility] provides. The information contained herein is not intended to cover all possible uses, directions, precautions, warnings, drug interactions, allergic reactions, or adverse effects. If you have questions about the drugs you are taking, check with your doctor, nurse or pharmacist.  Copyright 3106-8643 [address]. Version: 16.01. Revision date: 4/30/2020. Care instructions adapted under license by [facility] [place] ([facility]). If you have questions about a medical condition or this instruction, always ask your healthcare professional. [place] [address] any warranty or liability for your use of this information.

## 2021-04-09 ENCOUNTER — TELEPHONE (OUTPATIENT)
Dept: FAMILY MEDICINE CLINIC | Age: 51
End: 2021-04-09

## 2021-04-09 NOTE — TELEPHONE ENCOUNTER
LMOM regarding lab results vit b12/folate are within normal limits and trial of gabapentin 100 mg and to contact office to schedule a face to face visit or virtual visit in 6 weeks.

## 2021-04-09 NOTE — TELEPHONE ENCOUNTER
----- Message from Seng Warner MD sent at 4/7/2021 11:15 PM EDT -----  Regarding: Lab results and recommendations  Please notify patient that her Vitamin B12/Folate are within desired limits.       Recommend trial of gabapentin 100 mg QHS and F/U in 6 weeks (F2F or Vv)

## 2021-04-13 ENCOUNTER — TELEPHONE (OUTPATIENT)
Dept: FAMILY MEDICINE CLINIC | Age: 51
End: 2021-04-13

## 2021-04-13 DIAGNOSIS — R20.2 PARESTHESIA: Primary | ICD-10-CM

## 2021-04-13 RX ORDER — GABAPENTIN 100 MG/1
100 CAPSULE ORAL NIGHTLY
Qty: 90 CAPSULE | Refills: 1 | Status: SHIPPED
Start: 2021-04-13 | End: 2021-05-11

## 2021-04-13 NOTE — TELEPHONE ENCOUNTER
Patient called back, is okay with the trial of gabapentin and has follow up appointment scheduled for 5/11/2021 for follow up of the medication. Would like the gabapentin sent to [address].

## 2021-05-05 ASSESSMENT — ENCOUNTER SYMPTOMS
WHEEZING: 0
ABDOMINAL PAIN: 0
COUGH: 0
BACK PAIN: 0
NAUSEA: 0
SORE THROAT: 0
VOMITING: 0
DIARRHEA: 0
SHORTNESS OF BREATH: 0
CONSTIPATION: 0
BLOOD IN STOOL: 0

## 2021-05-05 NOTE — PROGRESS NOTES
Jasmyn Best is a 49 y.o. female who presents today for     Chief Complaint   Patient presents with    Discuss Medications     follow up on gabapentin, does not help       She complains of paresthesias (tingling and burning) of bilateral hands and feet x 3 months, since just after being diagnosed with COVID. Duration is intermittent. Worsened by change of temperature. Wax and wane with spontaneous improvement and recurrence. No medication/nonmedication treatments. Associated symptoms include tightness and pain of low back; does not impede gait or balance. ASSESSMENT/PLAN 4/6/21:    ADDENDUM:    Lab Results   Component Value Date    [id number] 04/06/2021     Vitamin B12/Folate are within desired limits. Will prescribe trial of gabapentin 100 mg QHS and F/U in 6 weeks (F2F or Vv)    CURRENT STATUS:  No significant improvement with the current dosing of gabapentin. She did report a mild to moderate \"hangover\" feeling the next AM after taking it. She is willing to attempt an increase of dosing with a gradual titration to Gabapentin 100 mg TID. [address]:  Patient's past medical, surgical, social and/or family history reviewed, updated in chart, and are non-contributory (unless otherwise stated). Medications and allergies also reviewed and updated in chart. Review of Systems  Review of Systems   HENT: Negative for congestion, ear pain and sore throat. Respiratory: Negative for cough, shortness of breath and wheezing. Cardiovascular: Negative for chest pain, palpitations and leg swelling. Gastrointestinal: Negative for abdominal pain, blood in stool, constipation, diarrhea, nausea and vomiting. Genitourinary: Negative for dysuria, frequency, hematuria and urgency. Musculoskeletal: Negative for back pain, myalgias and neck pain. Skin: Negative for rash. Neurological: Positive for headaches. Negative for dizziness and weakness.    Psychiatric/Behavioral: The patient is not nervous/anxious. Physical Exam:    VS:  /76   Pulse 66   Temp 96.6 °F (35.9 °C) (Infrared)   Resp 18   Ht 6' (1.829 m)   Wt 262 lb (118.8 kg)   LMP 05/01/2017   SpO2 97%   BMI 35.53 kg/m²     LAST WEIGHT:  Wt Readings from Last 3 Encounters:   05/11/21 262 lb (118.8 kg)   04/06/21 260 lb (117.9 kg)   01/06/21 261 lb 4.8 oz (118.5 kg)       BMI Readings from Last 3 Encounters:   05/11/21 35.53 kg/m²   04/06/21 35.26 kg/m²   01/06/21 35.44 kg/m²       Physical Exam  Constitutional:       General: She is not in acute distress. Appearance: She is well-developed. She is not diaphoretic. HENT:      Head: Normocephalic and atraumatic. Right Ear: External ear normal.      Left Ear: External ear normal.      Nose: Nose normal.      Right Sinus: No maxillary sinus tenderness or frontal sinus tenderness. Left Sinus: No maxillary sinus tenderness or frontal sinus tenderness. Mouth/Throat:      Mouth: Mucous membranes are moist.      Pharynx: Oropharynx is clear. Uvula midline. No pharyngeal swelling, oropharyngeal exudate, posterior oropharyngeal erythema or uvula swelling. Eyes:      General: No scleral icterus. Right eye: No discharge. Conjunctiva/sclera: Conjunctivae normal.      Pupils: Pupils are equal, round, and reactive to light. Neck:      Musculoskeletal: Normal range of motion and neck supple. Thyroid: No thyromegaly. Cardiovascular:      Rate and Rhythm: Normal rate and regular rhythm. Heart sounds: Normal heart sounds. No murmur. Pulmonary:      Effort: Pulmonary effort is normal. No respiratory distress. Breath sounds: No stridor. No wheezing or rales. Chest:      Chest wall: No tenderness. Abdominal:      General: Bowel sounds are normal. There is no distension. Palpations: Abdomen is soft. There is no mass. Tenderness: There is no abdominal tenderness. There is no guarding. Musculoskeletal: Normal range of motion. General: No tenderness. Lymphadenopathy:      Cervical: No cervical adenopathy. Skin:     General: Skin is warm and dry. Coloration: Skin is not pale. Findings: No erythema or rash. Neurological:      Mental Status: She is alert and oriented to person, place, and time. Psychiatric:         Behavior: Behavior normal.         Thought Content: Thought content normal.         Labs:  Lab Results   Component Value Date     01/06/2021    K 4.1 01/06/2021     01/06/2021    CO2 28 01/06/2021    BUN 15 01/06/2021    CREATININE 1.2 01/06/2021    PROT 7.8 01/06/2021    LABALBU 4.3 01/06/2021    CALCIUM 8.2 01/06/2021    GFRAA 58 01/06/2021    LABGLOM 48 01/06/2021    GLUCOSE 105 01/06/2021    AST 28 01/06/2021    ALT 33 01/06/2021    ALKPHOS 62 01/06/2021    BILITOT 0.4 01/06/2021    TSH 0.751 04/16/2019    CHOL 198 11/19/2018    TRIG 220 11/19/2018    HDL 44 11/19/2018    LDLCALC 110 11/19/2018        Lab Results   Component Value Date    CHOL 198 11/19/2018    CHOL 203 (H) 08/22/2018    CHOL 204 (H) 11/03/2015     Lab Results   Component Value Date    TRIG 220 (H) 11/19/2018    TRIG 171 (H) 08/22/2018    TRIG 259 (H) 11/03/2015     Lab Results   Component Value Date    HDL 44 11/19/2018    HDL 47 08/22/2018    HDL 50 11/03/2015     Lab Results   Component Value Date    LDLCALC 110 (H) 11/19/2018    LDLCALC 122 (H) 08/22/2018    LDLCALC 102 (H) 11/03/2015       No results found for: LABA1C  Lab Results   Component Value Date    LDLCALC 110 (H) 11/19/2018    CREATININE 1.2 (H) 01/06/2021       Assessment / Plan:      Luis Aguirre was seen today for discuss medications. Diagnoses and all orders for this visit:    Paresthesia: suboptimal control of symptoms  -     Increase/titrate gabapentin (NEURONTIN) 100 MG capsule; Take 1 capsule by mouth 3 times daily for 180 days.  Intended supply: 90 days        Follow Up:  Return in 17 days (on 5/28/2021), or (Vv, Friday AM), for to assess response of

## 2021-05-11 ENCOUNTER — OFFICE VISIT (OUTPATIENT)
Dept: FAMILY MEDICINE CLINIC | Age: 51
End: 2021-05-11
Payer: COMMERCIAL

## 2021-05-11 VITALS
TEMPERATURE: 96.6 F | RESPIRATION RATE: 18 BRPM | SYSTOLIC BLOOD PRESSURE: 124 MMHG | WEIGHT: 262 LBS | DIASTOLIC BLOOD PRESSURE: 76 MMHG | BODY MASS INDEX: 35.49 KG/M2 | HEART RATE: 66 BPM | HEIGHT: 72 IN | OXYGEN SATURATION: 97 %

## 2021-05-11 DIAGNOSIS — R20.2 PARESTHESIA: ICD-10-CM

## 2021-05-11 PROCEDURE — 99213 OFFICE O/P EST LOW 20 MIN: CPT | Performed by: FAMILY MEDICINE

## 2021-05-11 RX ORDER — GABAPENTIN 100 MG/1
100 CAPSULE ORAL 3 TIMES DAILY
Qty: 270 CAPSULE | Refills: 1 | Status: SHIPPED
Start: 2021-05-11 | End: 2021-05-28 | Stop reason: ALTCHOICE

## 2021-05-26 ASSESSMENT — ENCOUNTER SYMPTOMS
NAUSEA: 0
ABDOMINAL PAIN: 0
SHORTNESS OF BREATH: 0
BACK PAIN: 0
CONSTIPATION: 0
DIARRHEA: 0
BLOOD IN STOOL: 0
COUGH: 0
SORE THROAT: 0
VOMITING: 0
WHEEZING: 0

## 2021-05-27 NOTE — PROGRESS NOTES
Catherine Perez, was evaluated through a synchronous (real-time) audio-video encounter. The patient (or guardian if applicable) is aware that this is a billable service. Verbal consent to proceed has been obtained within the past 12 months. The visit was conducted pursuant to the emergency declaration under the [facility], [address] [place] [address] and the [employer] General Act. Patient identification was verified, and a caregiver was present when appropriate. The patient was located in a state where the provider was credentialed to provide care. Catherine Perez is a 49 y.o. female who presents today for     Chief Complaint   Patient presents with    Other     no complaints       She complains of paresthesias (tingling and burning) of bilateral hands and feet x 3 months, since just after being diagnosed with COVID. Duration is intermittent. Worsened by change of temperature. Wax and wane with spontaneous improvement and recurrence. No medication/nonmedication treatments. Associated symptoms include tightness and pain of low back; does not impede gait or balance. CURRENT STATUS:   Gabapentin 100 mg gradually increased to TID. She is taking meds as directed without any relief. She reports optimal sleep. [address]:  Patient's past medical, surgical, social and/or family history reviewed, updated in chart, and are non-contributory (unless otherwise stated). Medications and allergies also reviewed and updated in chart. Review of Systems  Review of Systems   HENT: Negative for congestion, ear pain and sore throat. Respiratory: Negative for cough, shortness of breath and wheezing. Cardiovascular: Negative for chest pain, palpitations and leg swelling. Gastrointestinal: Negative for abdominal pain, blood in stool, constipation, diarrhea, nausea and vomiting.    Genitourinary: Negative for dysuria, frequency, hematuria and urgency. Musculoskeletal: Negative for back pain, myalgias and neck pain. Skin: Negative for rash. Neurological: Positive for headaches. Negative for dizziness and weakness. Psychiatric/Behavioral: The patient is not nervous/anxious. Physical Exam:    Patient-Reported Vitals 5/28/2021   Patient-Reported Weight 258.4   Patient-Reported Height 6'0\"   Patient-Reported Pulse 65      Physical Exam  Constitutional:       General: She is not in acute distress. Appearance: She is well-developed. She is not diaphoretic. HENT:      Head: Normocephalic and atraumatic. Right Ear: External ear normal.      Left Ear: External ear normal.      Nose: Nose normal.      Right Sinus: No maxillary sinus tenderness or frontal sinus tenderness. Left Sinus: No maxillary sinus tenderness or frontal sinus tenderness. Mouth/Throat:      Mouth: Mucous membranes are moist.      Pharynx: Oropharynx is clear. Uvula midline. No pharyngeal swelling, oropharyngeal exudate, posterior oropharyngeal erythema or uvula swelling. Eyes:      General: No scleral icterus. Right eye: No discharge. Conjunctiva/sclera: Conjunctivae normal.      Pupils: Pupils are equal, round, and reactive to light. Neck:      Thyroid: No thyromegaly. Cardiovascular:      Rate and Rhythm: Normal rate and regular rhythm. Heart sounds: Normal heart sounds. No murmur heard. Pulmonary:      Effort: Pulmonary effort is normal. No respiratory distress. Breath sounds: No stridor. No wheezing or rales. Chest:      Chest wall: No tenderness. Abdominal:      General: Bowel sounds are normal. There is no distension. Palpations: Abdomen is soft. There is no mass. Tenderness: There is no abdominal tenderness. There is no guarding. Musculoskeletal:         General: No tenderness. Normal range of motion. Cervical back: Normal range of motion and neck supple.    Lymphadenopathy: Cervical: No cervical adenopathy. Skin:     General: Skin is warm and dry. Coloration: Skin is not pale. Findings: No erythema or rash. Neurological:      Mental Status: She is alert and oriented to person, place, and time. Psychiatric:         Behavior: Behavior normal.         Thought Content: Thought content normal.         Labs:  Lab Results   Component Value Date     01/06/2021    K 4.1 01/06/2021     01/06/2021    CO2 28 01/06/2021    BUN 15 01/06/2021    CREATININE 1.2 01/06/2021    PROT 7.8 01/06/2021    LABALBU 4.3 01/06/2021    CALCIUM 8.2 01/06/2021    GFRAA 58 01/06/2021    LABGLOM 48 01/06/2021    GLUCOSE 105 01/06/2021    AST 28 01/06/2021    ALT 33 01/06/2021    ALKPHOS 62 01/06/2021    BILITOT 0.4 01/06/2021    TSH 0.751 04/16/2019    CHOL 198 11/19/2018    TRIG 220 11/19/2018    HDL 44 11/19/2018    LDLCALC 110 11/19/2018        Lab Results   Component Value Date    CHOL 198 11/19/2018    CHOL 203 (H) 08/22/2018    CHOL 204 (H) 11/03/2015     Lab Results   Component Value Date    TRIG 220 (H) 11/19/2018    TRIG 171 (H) 08/22/2018    TRIG 259 (H) 11/03/2015     Lab Results   Component Value Date    HDL 44 11/19/2018    HDL 47 08/22/2018    HDL 50 11/03/2015     Lab Results   Component Value Date    LDLCALC 110 (H) 11/19/2018    LDLCALC 122 (H) 08/22/2018    LDLCALC 102 (H) 11/03/2015       No results found for: LABA1C  Lab Results   Component Value Date    LDLCALC 110 (H) 11/19/2018    CREATININE 1.2 (H) 01/06/2021         Assessment / Plan:      Shreya Levi was seen today for other. Diagnoses and all orders for this visit:    Paresthesia:Persistent symptoms that are not responding to trial gabapentin        -     Discontinue gabapentin via rapid taper  -     pregabalin (LYRICA) 50 MG capsule; Take 2 capsules by mouth 3 times daily. Start at 1 capsule a day. Increase by 1 capsule every 5 days until 2 capsules TID or lowest effective dose  -     C-Reactive Protein;  Future -     Sedimentation Rate; Future          Follow Up:  Return for Keep scheduled appointment(s), Fasting lab work 1 week prior. or sooner if necessary. Call or go to ED immediately if symptoms worsen or persist.    Educational materials(pregabalin)printed for patient's review and were included in patient instructions on his/her AfterVisit Summary and given to patient at the end of visit. Counseled regarding above diagnosis,including possible risks and complications,  especially if left uncontrolled. Counseled regarding the possible side effects, risks, benefits and alternatives to treatment; patient and/or guardian verbalizes understanding, agrees, feels comfortable with and wishes to proceed with above treatment plan. Advised patient tocall with any new medication issues, and read all Rx info from pharmacy to assureaware of all possible risks and side effects of medication before taking. Reviewed age and gender appropriate health screening exams and vaccinations. Advisedpatient regarding importance of keeping up with recommended health maintenance andto schedule as soon as possible if overdue, as this is important in assessing forundiagnosed pathology, especially cancer, as well as protecting against potentially harmful/life threatening disease. Patient and/or guardian verbalizes understandingand agrees with above counseling, assessment and plan. All questions answered. Leelee Mendez MD    --Leelee Mendez MD on 5/28/2021 at 8:52 AM    An electronic signature was used to authenticate this note.

## 2021-05-28 ENCOUNTER — TELEPHONE (OUTPATIENT)
Dept: FAMILY MEDICINE CLINIC | Age: 51
End: 2021-05-28

## 2021-05-28 ENCOUNTER — VIRTUAL VISIT (OUTPATIENT)
Dept: FAMILY MEDICINE CLINIC | Age: 51
End: 2021-05-28
Payer: COMMERCIAL

## 2021-05-28 DIAGNOSIS — R20.2 PARESTHESIA: Primary | ICD-10-CM

## 2021-05-28 PROCEDURE — 99214 OFFICE O/P EST MOD 30 MIN: CPT | Performed by: FAMILY MEDICINE

## 2021-05-28 RX ORDER — PREGABALIN 50 MG/1
100 CAPSULE ORAL 3 TIMES DAILY
Qty: 180 CAPSULE | Refills: 1 | Status: SHIPPED
Start: 2021-05-28 | End: 2021-07-06 | Stop reason: SINTOL

## 2021-05-28 SDOH — ECONOMIC STABILITY: FOOD INSECURITY: WITHIN THE PAST 12 MONTHS, YOU WORRIED THAT YOUR FOOD WOULD RUN OUT BEFORE YOU GOT MONEY TO BUY MORE.: NEVER TRUE

## 2021-05-28 SDOH — ECONOMIC STABILITY: FOOD INSECURITY: WITHIN THE PAST 12 MONTHS, THE FOOD YOU BOUGHT JUST DIDN'T LAST AND YOU DIDN'T HAVE MONEY TO GET MORE.: NEVER TRUE

## 2021-05-28 NOTE — TELEPHONE ENCOUNTER
Returned call to pharmacy regarding lyrica instructions. Informed that patient is just now starting out on lyrica and doing the titration. Pharmacist voiced understanding of this.

## 2021-05-28 NOTE — PATIENT INSTRUCTIONS
Patient Education        pregabalin  Pronunciation:  pre RUDI a maxi  Brand:  Lyrica, Lyrica CR  What is the most important information I should know about pregabalin? Pregabalin can cause a severe allergic reaction. Stop taking this medicine and seek emergency medical help if you have hives or blisters on your skin, trouble breathing, or swelling in your face, mouth, or throat. Some people have thoughts about suicide while taking pregabalin. Stay alert to changes in your mood or symptoms. Report any new or worsening symptoms to your doctor. If you have diabetes or heart problems, call your doctor if you have weight gain or swelling in your hands or feet while taking pregabalin. Do not stop using pregabalin suddenly, even if you feel fine. Stopping suddenly may cause withdrawal symptoms. What is pregabalin? Pregabalin is an anti-epileptic drug, also called an anticonvulsant. It works by slowing down impulses in the brain that cause seizures. Pregabalin also affects chemicals in the brain that send pain signals across the nervous system. Pregabalin is used to treat pain caused by fibromyalgia, or nerve pain in people with diabetes (diabetic neuropathy), herpes zoster (post-herpetic neuralgia), or spinal cord injury. Pregabalin is also used with other medications to treat partial onset seizures in adults and children who are at least 2 month old. Pregabalin may also be used for purposes not listed in this medication guide. What should I discuss with my healthcare provider before taking pregabalin? You should not use pregabalin if you are allergic to it. Tell your doctor if you have ever had:  · lung disease, such as chronic obstructive pulmonary disease (COPD);   · a mood disorder, depression, or suicidal thoughts;  · heart problems (especially congestive heart failure);  · a bleeding disorder, or low levels of platelets in your blood;  · kidney disease (or if you are on dialysis);  · diabetes (unless you are taking pregabalin to treat diabetic neuropathy);  · drug or alcohol addiction; or  · a severe allergic reaction (angioedema). Do not give this medicine to a child without medical advice. · Pregabalin is not approved for use by anyone younger than 25years old to treat nerve pain caused by fibromyalgia, diabetes, herpes zoster, or spinal cord injury. · Pregabalin is not approved for seizures in anyone younger than 2 month old. Some people have thoughts about suicide while taking pregabalin. Your doctor will need to check your progress at regular visits. Your family or other caregivers should also be alert to changes in your mood or symptoms. Seizure control is very important during pregnancy, and having a seizure could harm both mother and baby. Do not start or stop taking pregabalin without your doctor's advice, and tell your doctor right away if you become pregnant. If you are pregnant, your name may be listed on a pregnancy registry to track the effects of pregabalin on the baby. Pregabalin can decrease sperm count and may affect fertility in men (your ability to have children). In animal studies, pregabalin also caused birth defects in the offspring of males treated with this medicine. However, it is not known whether these effects would occur in humans. Ask your doctor about your risk. You should not breastfeed while using pregabalin. How should I take pregabalin? Follow all directions on your prescription label and read all medication guides or instruction sheets. Your doctor may occasionally change your dose. Use the medicine exactly as directed. Take the medicine at the same time each day, with or without food. Swallow an extended-release tablet whole and do not crush, chew, or break it. Measure liquid medicine carefully. Use the dosing syringe provided, or use a medicine dose-measuring device (not a kitchen spoon). Call your doctor if your symptoms do not improve, or if they get worse. Do not stop using pregabalin suddenly, even if you feel fine. Stopping suddenly may cause increased seizures or unpleasant withdrawal symptoms. Follow your doctor's instructions about tapering your dose for at least 1 week before stopping completely. In case of emergency, wear or carry medical identification to let others know you take seizure medication. Store at room temperature away from moisture, heat, and light. What happens if I miss a dose? Take the medicine as soon as you can, but skip the missed dose if it is almost time for your next dose. Do not take two doses at one time. What happens if I overdose? Seek emergency medical attention or call the Poison Help line at 1-123.303.3514. What should I avoid while taking pregabalin? Avoid drinking alcohol. It may increase certain side effects of pregabalin. Avoid driving or hazardous activity until you know how this medicine will affect you. Your reactions could be impaired. What are the possible side effects of pregabalin? Pregabalin can cause a severe allergic reaction. Stop taking this medicine and get emergency medical help if you have: hives or blisters on your skin; difficult breathing; swelling of your face, lips, tongue, or throat. Report any new or worsening symptoms to your doctor, such as: mood or behavior changes, depression, anxiety, panic attacks, trouble sleeping, or if you feel impulsive, irritable, agitated, hostile, aggressive, restless, hyperactive (mentally or physically), or have thoughts about suicide or hurting yourself.   Call your doctor at once if you have:  · weak or shallow breathing;  · blue-colored skin, lips, fingers, and toes;  · confusion, extreme drowsiness or weakness;  · vision problems;  · skin sores (if you have diabetes);  · easy bruising, unusual bleeding;  · swelling in your hands or feet, rapid weight gain (especially if you have diabetes or heart problems); or  · unexplained muscle pain, tenderness, or weakness (especially if you also have fever or don't feel well). Pregabalin can cause life-threatening breathing problems. A person caring for you should seek emergency medical attention if you have slow breathing with long pauses, blue colored lips, or if you are hard to wake up. Breathing problems may be more likely in older adults or in people with COPD. If you have diabetes,  tell your doctor right away if you have any new sores or other skin problems. Common side effects may include:  · dizziness, drowsiness;  · swelling in your hands and feet;  · trouble concentrating;  · increased appetite;  · weight gain;  · dry mouth; or  · blurred vision. This is not a complete list of side effects and others may occur. Call your doctor for medical advice about side effects. You may report side effects to FDA at 6-284-FDA-6840. What other drugs will affect pregabalin? Using pregabalin with other drugs that slow your breathing can cause dangerous side effects or death. Ask your doctor before using opioid medication, a sleeping pill, cold or allergy medicine, a muscle relaxer, or medicine for anxiety or seizures. Tell your doctor about all your other medicines, especially:  · oral diabetes medicine --pioglitazone, rosiglitazone; or  · an ACE inhibitor --benazepril, captopril, enalapril, fosinopril, lisinopril, moexipril, perindopril, quinapril, ramipril, or trandolapril. This list is not complete. Other drugs may affect pregabalin, including prescription and over-the-counter medicines, vitamins, and herbal products. Not all possible drug interactions are listed here. Where can I get more information? Your pharmacist can provide more information about pregabalin. Remember, keep this and all other medicines out of the reach of children, never share your medicines with others, and use this medication only for the indication prescribed.    Every effort has been made to ensure that the information provided by [address] [place] [address] Dr CRAFT ('[employer]') is accurate, up-to-date, and complete, but no guarantee is made to that effect. Drug information contained herein may be time sensitive. [employer] information has been compiled for use by healthcare practitioners and consumers in the [place] and therefore [employer] does not warrant that uses outside of the [place] are appropriate, unless specifically indicated otherwise. [employer]'s drug information does not endorse drugs, diagnose patients or recommend therapy. [employer]'s drug information is an informational resource designed to assist licensed healthcare practitioners in caring for their patients and/or to serve consumers viewing this service as a supplement to, and not a substitute for, the expertise, skill, knowledge and judgment of healthcare practitioners. The absence of a warning for a given drug or drug combination in no way should be construed to indicate that the drug or drug combination is safe, effective or appropriate for any given patient. [facility] does not assume any responsibility for any aspect of healthcare administered with the aid of information [employer] provides. The information contained herein is not intended to cover all possible uses, directions, precautions, warnings, drug interactions, allergic reactions, or adverse effects. If you have questions about the drugs you are taking, check with your doctor, nurse or pharmacist.  Copyright 3584-9055 [address]. Version: 9.01. Revision date: 12/26/2019. Care instructions adapted under license by [facility] ([facility]). If you have questions about a medical condition or this instruction, always ask your healthcare professional. [place] [address] any warranty or liability for your use of this information.

## 2021-06-28 DIAGNOSIS — I10 ESSENTIAL HYPERTENSION: ICD-10-CM

## 2021-06-28 DIAGNOSIS — R20.2 PARESTHESIA: ICD-10-CM

## 2021-06-28 DIAGNOSIS — E55.9 VITAMIN D INSUFFICIENCY: ICD-10-CM

## 2021-06-28 LAB
ALBUMIN SERPL-MCNC: 4.1 G/DL (ref 3.5–5.2)
ALP BLD-CCNC: 51 U/L (ref 35–104)
ALT SERPL-CCNC: 24 U/L (ref 0–32)
ANION GAP SERPL CALCULATED.3IONS-SCNC: 15 MMOL/L (ref 7–16)
AST SERPL-CCNC: 24 U/L (ref 0–31)
BASOPHILS ABSOLUTE: 0.06 E9/L (ref 0–0.2)
BASOPHILS RELATIVE PERCENT: 1 % (ref 0–2)
BILIRUB SERPL-MCNC: 0.4 MG/DL (ref 0–1.2)
BUN BLDV-MCNC: 14 MG/DL (ref 6–20)
C-REACTIVE PROTEIN: 0.5 MG/DL (ref 0–0.4)
CALCIUM SERPL-MCNC: 9 MG/DL (ref 8.6–10.2)
CHLORIDE BLD-SCNC: 103 MMOL/L (ref 98–107)
CHOLESTEROL, TOTAL: 198 MG/DL (ref 0–199)
CO2: 26 MMOL/L (ref 22–29)
CREAT SERPL-MCNC: 1.1 MG/DL (ref 0.5–1)
EOSINOPHILS ABSOLUTE: 0.23 E9/L (ref 0.05–0.5)
EOSINOPHILS RELATIVE PERCENT: 4 % (ref 0–6)
GFR AFRICAN AMERICAN: >60
GFR NON-AFRICAN AMERICAN: 53 ML/MIN/1.73
GLUCOSE BLD-MCNC: 128 MG/DL (ref 74–99)
HCT VFR BLD CALC: 43.2 % (ref 34–48)
HDLC SERPL-MCNC: 39 MG/DL
HEMOGLOBIN: 13.8 G/DL (ref 11.5–15.5)
IMMATURE GRANULOCYTES #: 0.02 E9/L
IMMATURE GRANULOCYTES %: 0.3 % (ref 0–5)
LDL CHOLESTEROL CALCULATED: 116 MG/DL (ref 0–99)
LYMPHOCYTES ABSOLUTE: 2.31 E9/L (ref 1.5–4)
LYMPHOCYTES RELATIVE PERCENT: 40.4 % (ref 20–42)
MCH RBC QN AUTO: 30.6 PG (ref 26–35)
MCHC RBC AUTO-ENTMCNC: 31.9 % (ref 32–34.5)
MCV RBC AUTO: 95.8 FL (ref 80–99.9)
MONOCYTES ABSOLUTE: 0.42 E9/L (ref 0.1–0.95)
MONOCYTES RELATIVE PERCENT: 7.3 % (ref 2–12)
NEUTROPHILS ABSOLUTE: 2.68 E9/L (ref 1.8–7.3)
NEUTROPHILS RELATIVE PERCENT: 47 % (ref 43–80)
PDW BLD-RTO: 12.8 FL (ref 11.5–15)
PLATELET # BLD: 294 E9/L (ref 130–450)
PMV BLD AUTO: 10.8 FL (ref 7–12)
POTASSIUM SERPL-SCNC: 4 MMOL/L (ref 3.5–5)
RBC # BLD: 4.51 E12/L (ref 3.5–5.5)
SEDIMENTATION RATE, ERYTHROCYTE: 9 MM/HR (ref 0–20)
SODIUM BLD-SCNC: 144 MMOL/L (ref 132–146)
TOTAL PROTEIN: 6.9 G/DL (ref 6.4–8.3)
TRIGL SERPL-MCNC: 214 MG/DL (ref 0–149)
TSH SERPL DL<=0.05 MIU/L-ACNC: 0.95 UIU/ML (ref 0.27–4.2)
VITAMIN D 25-HYDROXY: 22 NG/ML (ref 30–100)
VLDLC SERPL CALC-MCNC: 43 MG/DL
WBC # BLD: 5.7 E9/L (ref 4.5–11.5)

## 2021-07-03 ASSESSMENT — ENCOUNTER SYMPTOMS
SHORTNESS OF BREATH: 0
SORE THROAT: 0
BACK PAIN: 0
ABDOMINAL PAIN: 0
WHEEZING: 0
VOMITING: 0
CONSTIPATION: 0
NAUSEA: 0
DIARRHEA: 0
COUGH: 0
BLOOD IN STOOL: 0

## 2021-07-04 NOTE — PROGRESS NOTES
Raffaele Orellana is a 49 y.o. female who presents today for     Chief Complaint   Patient presents with   Tyler Arevalo     no complaints, gabapentin refill     Paraesthesias:  She complains of paresthesias (tingling and burning) of bilateral hands and feet x 3 months, since just after being diagnosed with COVID. Duration is intermittent. Worsened by change of temperature. Wax and wane with spontaneous improvement and recurrence. No medication/nonmedication treatments. Associated symptoms include tightness and pain of low back; does not impede gait or balance. Trials of gabapentin and pregabalin were ineffective/produced side effects. She requested via [employer] to stop p[regabalin and resume gabapentin. Current dose is gabapentin 100 mg TID. She is advised to titrate to 200 mg TID     Hypertension:  Patient is here for follow up chronic hypertension. This is not generally controlled on current medication regimen (currently on Maxzide 25 1 1/2 tablets/day and metoprolol succinate [Toprol XL] 25 mg/day). BP today is 122/78. Takes meds as directed and tolerates them well. Symptoms at this time include headaches as above. Fair lifestyle compliance between presence of headaches and pandemic. Patient does not smoke. Comorbid conditions include stress/anxiety without medication treatment. She is due for medication refills. Lifestyle: Sleep is satisfactory with use of trazodone. She started a \"[employer] to [employer], with hopes to running a half marathon in October 2021. Eating is starting to improve. She has a subscription to [employer]; she is encouraged to utilize it to its full potential.    Hyperglycemia/Prediabetes:  . No previous workup or diagnosis of prediabetes/DM. Will check POCT HgbA1C today. Counseling about optimal lifestyle provided.     Results for POC orders placed in visit on 07/06/21   POCT glycosylated hemoglobin (Hb A1C)   Result Value Ref Range    Hemoglobin A1C 6.0 %       Result Negative for rash. Neurological: Positive for headaches. Negative for dizziness and weakness. Psychiatric/Behavioral: The patient is not nervous/anxious. Physical Exam:    VS:  /78   Pulse 53   Temp 97.7 °F (36.5 °C) (Infrared)   Resp 18   Ht 6' (1.829 m)   Wt 261 lb 12.8 oz (118.8 kg)   LMP 05/01/2017   SpO2 97%   BMI 35.51 kg/m²     LAST WEIGHT:  Wt Readings from Last 3 Encounters:   07/06/21 261 lb 12.8 oz (118.8 kg)   05/11/21 262 lb (118.8 kg)   04/06/21 260 lb (117.9 kg)       BMI Readings from Last 3 Encounters:   07/06/21 35.51 kg/m²   05/11/21 35.53 kg/m²   04/06/21 35.26 kg/m²       Physical Exam  Constitutional:       General: She is not in acute distress. Appearance: She is well-developed. She is not diaphoretic. HENT:      Head: Normocephalic and atraumatic. Right Ear: External ear normal.      Left Ear: External ear normal.      Nose: Nose normal.      Right Sinus: No maxillary sinus tenderness or frontal sinus tenderness. Left Sinus: No maxillary sinus tenderness or frontal sinus tenderness. Mouth/Throat:      Mouth: Mucous membranes are moist.      Pharynx: Oropharynx is clear. Uvula midline. No pharyngeal swelling, oropharyngeal exudate, posterior oropharyngeal erythema or uvula swelling. Eyes:      General: No scleral icterus. Right eye: No discharge. Conjunctiva/sclera: Conjunctivae normal.      Pupils: Pupils are equal, round, and reactive to light. Neck:      Thyroid: No thyromegaly. Cardiovascular:      Rate and Rhythm: Normal rate and regular rhythm. Heart sounds: Normal heart sounds. No murmur heard. Pulmonary:      Effort: Pulmonary effort is normal. No respiratory distress. Breath sounds: No stridor. No wheezing or rales. Chest:      Chest wall: No tenderness. Abdominal:      General: Bowel sounds are normal. There is no distension. Palpations: Abdomen is soft. There is no mass. Tenderness:  There is no abdominal tenderness. There is no guarding. Musculoskeletal:         General: No tenderness. Normal range of motion. Cervical back: Normal range of motion and neck supple. Lymphadenopathy:      Cervical: No cervical adenopathy. Skin:     General: Skin is warm and dry. Coloration: Skin is not pale. Findings: No erythema or rash. Neurological:      Mental Status: She is alert and oriented to person, place, and time. Psychiatric:         Behavior: Behavior normal.         Thought Content: Thought content normal.         Labs:  Lab Results   Component Value Date     06/28/2021    K 4.0 06/28/2021     06/28/2021    CO2 26 06/28/2021    BUN 14 06/28/2021    CREATININE 1.1 06/28/2021    PROT 6.9 06/28/2021    LABALBU 4.1 06/28/2021    CALCIUM 9.0 06/28/2021    GFRAA >60 06/28/2021    LABGLOM 53 06/28/2021    GLUCOSE 128 06/28/2021    AST 24 06/28/2021    ALT 24 06/28/2021    ALKPHOS 51 06/28/2021    BILITOT 0.4 06/28/2021    TSH 0.954 06/28/2021    CHOL 198 06/28/2021    TRIG 214 06/28/2021    HDL 39 06/28/2021    LDLCALC 116 06/28/2021        Lab Results   Component Value Date    CHOL 198 06/28/2021    CHOL 198 11/19/2018    CHOL 203 (H) 08/22/2018     Lab Results   Component Value Date    TRIG 214 (H) 06/28/2021    TRIG 220 (H) 11/19/2018    TRIG 171 (H) 08/22/2018     Lab Results   Component Value Date    HDL 39 06/28/2021    HDL 44 11/19/2018    HDL 47 08/22/2018     Lab Results   Component Value Date    LDLCALC 116 (H) 06/28/2021    LDLCALC 110 (H) 11/19/2018    LDLCALC 122 (H) 08/22/2018       No results found for: LABA1C  Lab Results   Component Value Date    LDLCALC 116 (H) 06/28/2021    CREATININE 1.1 (H) 06/28/2021         Assessment / Plan:      Kd Kamara was seen today for check-up. Diagnoses and all orders for this visit:    Essential hypertension: Stable and well controlled  -     triamterene-hydroCHLOROthiazide (MAXZIDE-25) 37.5-25 MG per tablet;  Take 1 tablet by mouth daily  -     metoprolol succinate (TOPROL XL) 50 MG extended release tablet; Take 0.5 tablets by mouth daily  -     Comprehensive Metabolic Panel; Future  -     Lipid Panel; Future    Anxiety and depression: Under fair control  -     traZODone (DESYREL) 150 MG tablet; Take 1 tablet by mouth nightly  -     escitalopram (LEXAPRO) 20 MG tablet; Take 1 1/2 tablets daily    Paresthesia: Did not tolerate pregabalin well. Requested return to gabapentin with upward titration.  -     gabapentin (NEURONTIN) 100 MG capsule; Take 2 capsules by mouth 3 times daily for 180 days. Intended supply: 90 days    Vitamin D insufficiency: Patient does admit to noncompliance with vitamin D  -     vitamin D3 (CHOLECALCIFEROL) 25 MCG (1000 UT) TABS tablet; Take 2 tablets by mouth daily  -     Vitamin D 25 Hydroxy; Future    Prediabetes: New diagnosis  -     POCT glycosylated hemoglobin (Hb A1C)  -     Comprehensive Metabolic Panel; Future  -     Hemoglobin A1C; Future  -     Lipid Panel; Future        Time spent in pre-visit planning, interview, exam, /education and coordination of care: 50 minutes        Follow Up:  Return for F/U 3 mos check up; fasting labs 1 week prior. or sooner if necessary. Call or go to ED immediately if symptoms worsen or persist.    Educational materials and/or home exercises printed for patient's review and were included in patient instructions on his/her AfterVisit Summary and given to patient at the end of visit. Counseled regarding above diagnosis,including possible risks and complications,  especially if left uncontrolled. Counseled regarding the possible side effects, risks, benefits and alternatives to treatment; patient and/or guardian verbalizes understanding, agrees, feels comfortable with and wishes to proceed with above treatment plan.     Advised patient tocall with any new medication issues, and read all Rx info from pharmacy to assureaware of all possible risks and side effects of medication before taking. Reviewed age and gender appropriate health screening exams and vaccinations. Advisedpatient regarding importance of keeping up with recommended health maintenance andto schedule as soon as possible if overdue, as this is important in assessing forundiagnosed pathology, especially cancer, as well as protecting against potentially harmful/life threatening disease. Patient and/or guardian verbalizes understandingand agrees with above counseling, assessment and plan. All questions answered.     Nicolette Hernandez MD

## 2021-07-06 ENCOUNTER — OFFICE VISIT (OUTPATIENT)
Dept: FAMILY MEDICINE CLINIC | Age: 51
End: 2021-07-06
Payer: COMMERCIAL

## 2021-07-06 VITALS
HEART RATE: 53 BPM | WEIGHT: 261.8 LBS | RESPIRATION RATE: 18 BRPM | OXYGEN SATURATION: 97 % | BODY MASS INDEX: 35.46 KG/M2 | SYSTOLIC BLOOD PRESSURE: 122 MMHG | DIASTOLIC BLOOD PRESSURE: 78 MMHG | TEMPERATURE: 97.7 F | HEIGHT: 72 IN

## 2021-07-06 DIAGNOSIS — E55.9 VITAMIN D INSUFFICIENCY: ICD-10-CM

## 2021-07-06 DIAGNOSIS — R73.03 PREDIABETES: ICD-10-CM

## 2021-07-06 DIAGNOSIS — F32.A ANXIETY AND DEPRESSION: ICD-10-CM

## 2021-07-06 DIAGNOSIS — R20.2 PARESTHESIA: ICD-10-CM

## 2021-07-06 DIAGNOSIS — I10 ESSENTIAL HYPERTENSION: Primary | ICD-10-CM

## 2021-07-06 DIAGNOSIS — F41.9 ANXIETY AND DEPRESSION: ICD-10-CM

## 2021-07-06 LAB — HBA1C MFR BLD: 6 %

## 2021-07-06 PROCEDURE — 99215 OFFICE O/P EST HI 40 MIN: CPT | Performed by: FAMILY MEDICINE

## 2021-07-06 PROCEDURE — 83036 HEMOGLOBIN GLYCOSYLATED A1C: CPT | Performed by: FAMILY MEDICINE

## 2021-07-06 RX ORDER — GABAPENTIN 100 MG/1
200 CAPSULE ORAL 3 TIMES DAILY
Qty: 540 CAPSULE | Refills: 1 | Status: SHIPPED
Start: 2021-07-06 | End: 2022-05-02 | Stop reason: SDUPTHER

## 2021-07-06 RX ORDER — MELATONIN
2000 DAILY
Qty: 90 TABLET | Refills: 3 | COMMUNITY
Start: 2021-07-06 | End: 2022-05-02 | Stop reason: DRUGHIGH

## 2021-11-21 PROBLEM — R20.2 PARESTHESIA: Status: ACTIVE | Noted: 2021-11-21

## 2021-11-21 PROBLEM — R73.03 PREDIABETES: Status: ACTIVE | Noted: 2021-11-21

## 2021-11-24 ENCOUNTER — TELEPHONE (OUTPATIENT)
Dept: FAMILY MEDICINE CLINIC | Age: 51
End: 2021-11-24

## 2022-03-18 DIAGNOSIS — I10 ESSENTIAL HYPERTENSION: ICD-10-CM

## 2022-03-21 RX ORDER — TRIAMTERENE AND HYDROCHLOROTHIAZIDE 37.5; 25 MG/1; MG/1
1 TABLET ORAL DAILY
Qty: 90 TABLET | Refills: 3 | OUTPATIENT
Start: 2022-03-21 | End: 2023-03-21

## 2022-03-29 RX ORDER — TRIAMTERENE AND HYDROCHLOROTHIAZIDE 37.5; 25 MG/1; MG/1
1 TABLET ORAL DAILY
Qty: 30 TABLET | Refills: 0 | Status: SHIPPED
Start: 2022-03-29 | End: 2022-05-02 | Stop reason: SDUPTHER

## 2022-03-29 NOTE — TELEPHONE ENCOUNTER
Please inform patient that prescription was sent without refills. She is overdue for appointment with Dr. Danyell Bonilla for her hypertension follow-up.

## 2022-05-02 ENCOUNTER — OFFICE VISIT (OUTPATIENT)
Dept: FAMILY MEDICINE CLINIC | Age: 52
End: 2022-05-02
Payer: COMMERCIAL

## 2022-05-02 VITALS
TEMPERATURE: 97.9 F | HEART RATE: 60 BPM | BODY MASS INDEX: 35.62 KG/M2 | OXYGEN SATURATION: 97 % | DIASTOLIC BLOOD PRESSURE: 84 MMHG | HEIGHT: 72 IN | SYSTOLIC BLOOD PRESSURE: 130 MMHG | RESPIRATION RATE: 16 BRPM | WEIGHT: 263 LBS

## 2022-05-02 DIAGNOSIS — F32.A ANXIETY AND DEPRESSION: ICD-10-CM

## 2022-05-02 DIAGNOSIS — M25.551 RIGHT HIP PAIN: ICD-10-CM

## 2022-05-02 DIAGNOSIS — R05.9 COUGH: ICD-10-CM

## 2022-05-02 DIAGNOSIS — R20.2 PARESTHESIA: ICD-10-CM

## 2022-05-02 DIAGNOSIS — F41.9 ANXIETY AND DEPRESSION: ICD-10-CM

## 2022-05-02 DIAGNOSIS — F98.8 ADULT ATTENTION DEFICIT DISORDER: ICD-10-CM

## 2022-05-02 DIAGNOSIS — I10 ESSENTIAL HYPERTENSION: ICD-10-CM

## 2022-05-02 DIAGNOSIS — E55.9 VITAMIN D INSUFFICIENCY: ICD-10-CM

## 2022-05-02 DIAGNOSIS — Z76.0 ENCOUNTER FOR MEDICATION REFILL: Primary | ICD-10-CM

## 2022-05-02 PROCEDURE — 99215 OFFICE O/P EST HI 40 MIN: CPT | Performed by: FAMILY MEDICINE

## 2022-05-02 RX ORDER — ALBUTEROL SULFATE 90 UG/1
2 AEROSOL, METERED RESPIRATORY (INHALATION)
Qty: 1 EACH | Refills: 11 | Status: SHIPPED | OUTPATIENT
Start: 2022-06-15 | End: 2023-06-15

## 2022-05-02 RX ORDER — ATOMOXETINE 60 MG/1
60 CAPSULE ORAL NIGHTLY
Qty: 90 CAPSULE | Refills: 3 | Status: SHIPPED
Start: 2022-05-16 | End: 2022-07-13 | Stop reason: ALTCHOICE

## 2022-05-02 RX ORDER — ATOMOXETINE 18 MG/1
18 CAPSULE ORAL DAILY
Qty: 7 CAPSULE | Refills: 0 | Status: SHIPPED
Start: 2022-05-02 | End: 2022-06-06 | Stop reason: SDUPTHER

## 2022-05-02 RX ORDER — MELATONIN
2000 DAILY
Qty: 90 TABLET | Refills: 3 | Status: CANCELLED | COMMUNITY
Start: 2022-05-02 | End: 2023-05-02

## 2022-05-02 RX ORDER — GABAPENTIN 100 MG/1
200 CAPSULE ORAL NIGHTLY
Qty: 180 CAPSULE | Refills: 3 | Status: SHIPPED | OUTPATIENT
Start: 2022-09-01 | End: 2023-09-01

## 2022-05-02 RX ORDER — TRAZODONE HYDROCHLORIDE 150 MG/1
150 TABLET ORAL NIGHTLY
Qty: 90 TABLET | Refills: 3 | Status: SHIPPED | OUTPATIENT
Start: 2022-06-15 | End: 2023-06-15

## 2022-05-02 RX ORDER — ESCITALOPRAM OXALATE 20 MG/1
TABLET ORAL
Qty: 135 TABLET | Refills: 3 | Status: SHIPPED
Start: 2022-06-15 | End: 2022-05-02 | Stop reason: ALTCHOICE

## 2022-05-02 RX ORDER — ATOMOXETINE 40 MG/1
40 CAPSULE ORAL DAILY
Qty: 7 CAPSULE | Refills: 0 | Status: SHIPPED
Start: 2022-05-09 | End: 2022-06-06 | Stop reason: SDUPTHER

## 2022-05-02 RX ORDER — TRIAMTERENE AND HYDROCHLOROTHIAZIDE 37.5; 25 MG/1; MG/1
1 TABLET ORAL DAILY
Qty: 90 TABLET | Refills: 3 | Status: SHIPPED | OUTPATIENT
Start: 2022-05-02 | End: 2023-05-02

## 2022-05-02 ASSESSMENT — PATIENT HEALTH QUESTIONNAIRE - PHQ9
2. FEELING DOWN, DEPRESSED OR HOPELESS: 2
10. IF YOU CHECKED OFF ANY PROBLEMS, HOW DIFFICULT HAVE THESE PROBLEMS MADE IT FOR YOU TO DO YOUR WORK, TAKE CARE OF THINGS AT HOME, OR GET ALONG WITH OTHER PEOPLE: 1
6. FEELING BAD ABOUT YOURSELF - OR THAT YOU ARE A FAILURE OR HAVE LET YOURSELF OR YOUR FAMILY DOWN: 3
SUM OF ALL RESPONSES TO PHQ QUESTIONS 1-9: 16
4. FEELING TIRED OR HAVING LITTLE ENERGY: 3
8. MOVING OR SPEAKING SO SLOWLY THAT OTHER PEOPLE COULD HAVE NOTICED. OR THE OPPOSITE, BEING SO FIGETY OR RESTLESS THAT YOU HAVE BEEN MOVING AROUND A LOT MORE THAN USUAL: 0
SUM OF ALL RESPONSES TO PHQ9 QUESTIONS 1 & 2: 3
5. POOR APPETITE OR OVEREATING: 3
7. TROUBLE CONCENTRATING ON THINGS, SUCH AS READING THE NEWSPAPER OR WATCHING TELEVISION: 3
3. TROUBLE FALLING OR STAYING ASLEEP: 1
SUM OF ALL RESPONSES TO PHQ QUESTIONS 1-9: 16
1. LITTLE INTEREST OR PLEASURE IN DOING THINGS: 1
SUM OF ALL RESPONSES TO PHQ QUESTIONS 1-9: 16
SUM OF ALL RESPONSES TO PHQ QUESTIONS 1-9: 16
9. THOUGHTS THAT YOU WOULD BE BETTER OFF DEAD, OR OF HURTING YOURSELF: 0

## 2022-05-02 ASSESSMENT — ENCOUNTER SYMPTOMS
SHORTNESS OF BREATH: 0
CONSTIPATION: 0
WHEEZING: 0
BLOOD IN STOOL: 0
BACK PAIN: 0
SORE THROAT: 0
ABDOMINAL PAIN: 0
VOMITING: 0
DIARRHEA: 0
COUGH: 0
NAUSEA: 0

## 2022-05-02 NOTE — PROGRESS NOTES
Camila Bowie is a 47 y.o. female who presents today for     Chief Complaint   Patient presents with    Depression     anxiety     Hypertension    Medication Refill    Pain     lower right side, continuous, x 2 months, OTC medication does not help      She is due for refills of all medications. While she does not endorse major depression, she appears to be depressed on exam.  Further history suggests that she also has difficulty focusing and concentration due to adult attention deficit    Paraesthesias:  She complains of paresthesias (tingling and burning) of bilateral hands and feet x 3 months, since just after being diagnosed with COVID. Duration is intermittent. Worsened by change of temperature. Wax and wane with spontaneous improvement and recurrence. No medication/nonmedication treatments. Associated symptoms include tightness and pain of low back; does not impede gait or balance. Using gabapentin 200 mg nightly. Hypertension:  Patient is here for follow up chronic hypertension. This is not generally controlled on current medication regimen (currently on Maxzide 25 1 1/2 tablets/day and metoprolol succinate [Toprol XL] 25 mg/day). BP today is 130/84. Takes meds as directed and tolerates them well. Symptoms at this time include headaches as above. Fair lifestyle compliance between presence of headaches and pandemic. Patient does not smoke. Comorbid conditions include stress/anxiety without medication treatment. She is due for medication refills. Anxiety and/or depression: This is a/an chronic problem. This has been going on for greater than 1 year. Exacerbating factors include concerns about the well being of her family (especially her college aged son), headache symptoms, and recent pandemic. Alleviating factors include compliance with medication as prescribed. Treatment in the past includes escitalopram, bupropion. Anxiety symptoms include palpitations, anxious mood. Depressive symptoms include depressed mood, anhedonia, fatigue. Patient does not have suicidal or homicidal ideation. Patient is not having issues falling or staying asleep. Patient is not having associated panic attacks. The above is not interfering with quality of life. Patient has seen a Counselor before. Patient does not use alcohol and/or drugs. Family history includes N/A. 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 Further history: she has a history of adult ADHD that had been treated in the past very successfully with stimulant medication. Medication was discontinued upon undergraduate graduation. Following prolonged discussion, shared decision making involved to cross titration off of Lexapro and onto atomoxetine--titrate to 60 mg a day. Vitamin D Insufficiency:  Vitamin D to 5000 IU /day    PMFSH:  Patient's past medical, surgical, social and/or family history reviewed, updated in chart, and are non-contributory (unless otherwise stated). Medications and allergies also reviewed and updated in chart. Review of Systems  Review of Systems   HENT: Negative for congestion, ear pain and sore throat. Respiratory: Negative for cough, shortness of breath and wheezing. Cardiovascular: Negative for chest pain, palpitations and leg swelling. Gastrointestinal: Negative for abdominal pain, blood in stool, constipation, diarrhea, nausea and vomiting. Genitourinary: Negative for dysuria, frequency, hematuria and urgency. Musculoskeletal: Negative for back pain, myalgias and neck pain. Skin: Negative for rash. Neurological: Positive for headaches. Negative for dizziness and weakness. Psychiatric/Behavioral: The patient is not nervous/anxious.         Physical Exam:    VS:  /84   Pulse 60   Temp 97.9 °F (36.6 °C) (Infrared)   Resp 16   Ht 6' (1.829 m)   Wt 263 lb (119.3 kg)   LMP 05/01/2017   SpO2 97%   BMI 35.67 kg/m²     LAST WEIGHT:  Wt Readings from Last 3 Encounters:   05/02/22 263 lb (119.3 kg)   07/06/21 261 lb 12.8 oz (118.8 kg)   05/11/21 262 lb (118.8 kg)       BMI Readings from Last 3 Encounters:   05/02/22 35.67 kg/m²   07/06/21 35.51 kg/m²   05/11/21 35.53 kg/m²       Physical Exam  Constitutional:       General: She is not in acute distress. Appearance: She is well-developed. She is not diaphoretic. HENT:      Head: Normocephalic and atraumatic. Right Ear: External ear normal.      Left Ear: External ear normal.      Nose: Nose normal.      Right Sinus: No maxillary sinus tenderness or frontal sinus tenderness. Left Sinus: No maxillary sinus tenderness or frontal sinus tenderness. Mouth/Throat:      Mouth: Mucous membranes are moist.      Pharynx: Oropharynx is clear. Uvula midline. No pharyngeal swelling, oropharyngeal exudate, posterior oropharyngeal erythema or uvula swelling. Eyes:      General: No scleral icterus. Right eye: No discharge. Conjunctiva/sclera: Conjunctivae normal.      Pupils: Pupils are equal, round, and reactive to light. Neck:      Thyroid: No thyromegaly. Cardiovascular:      Rate and Rhythm: Normal rate and regular rhythm. Heart sounds: Normal heart sounds. No murmur heard. Pulmonary:      Effort: Pulmonary effort is normal. No respiratory distress. Breath sounds: No stridor. No wheezing or rales. Chest:      Chest wall: No tenderness. Abdominal:      General: Bowel sounds are normal. There is no distension. Palpations: Abdomen is soft. There is no mass. Tenderness: There is no abdominal tenderness. There is no guarding. Musculoskeletal:         General: No tenderness. Normal range of motion. Cervical back: Normal range of motion and neck supple. Lymphadenopathy:      Cervical: No cervical adenopathy. Skin:     General: Skin is warm and dry.       Coloration: Skin is not pale.      Findings: No erythema or rash. Neurological:      Mental Status: She is alert and oriented to person, place, and time. Psychiatric:         Attention and Perception: Attention and perception normal.         Mood and Affect: Mood is depressed. Affect is blunt and flat. Behavior: Behavior normal.         Thought Content: Thought content normal.         Labs:  Lab Results   Component Value Date     06/28/2021    K 4.0 06/28/2021     06/28/2021    CO2 26 06/28/2021    BUN 14 06/28/2021    CREATININE 1.1 06/28/2021    PROT 6.9 06/28/2021    LABALBU 4.1 06/28/2021    CALCIUM 9.0 06/28/2021    GFRAA >60 06/28/2021    LABGLOM 53 06/28/2021    GLUCOSE 128 06/28/2021    AST 24 06/28/2021    ALT 24 06/28/2021    ALKPHOS 51 06/28/2021    BILITOT 0.4 06/28/2021    TSH 0.954 06/28/2021    CHOL 198 06/28/2021    TRIG 214 06/28/2021    HDL 39 06/28/2021    LDLCALC 116 06/28/2021    LABA1C 6.0 07/06/2021        Lab Results   Component Value Date    CHOL 198 06/28/2021    CHOL 198 11/19/2018    CHOL 203 (H) 08/22/2018     Lab Results   Component Value Date    TRIG 214 (H) 06/28/2021    TRIG 220 (H) 11/19/2018    TRIG 171 (H) 08/22/2018     Lab Results   Component Value Date    HDL 39 06/28/2021    HDL 44 11/19/2018    HDL 47 08/22/2018     Lab Results   Component Value Date    LDLCALC 116 (H) 06/28/2021    LDLCALC 110 (H) 11/19/2018    LDLCALC 122 (H) 08/22/2018       Lab Results   Component Value Date    LABA1C 6.0 07/06/2021     Lab Results   Component Value Date    LDLCALC 116 (H) 06/28/2021    CREATININE 1.1 (H) 06/28/2021           Assessment / Plan:      Farnaz Gandhi was seen today for depression, hypertension, medication refill and pain. Diagnoses and all orders for this visit:    Encounter for medication refill  -     triamterene-hydroCHLOROthiazide (MAXZIDE-25) 37.5-25 MG per tablet;  Take 1 tablet by mouth daily No further refills without appointment  -     escitalopram (LEXAPRO) 20 MG tablet; Take 1 1/2 tablets daily  -     traZODone (DESYREL) 150 MG tablet; Take 1 tablet by mouth nightly  -     albuterol sulfate HFA (VENTOLIN HFA) 108 (90 Base) MCG/ACT inhaler; Inhale 2 puffs into the lungs every 4-6 hours as needed for Wheezing or Shortness of Breath  -     gabapentin (NEURONTIN) 100 MG capsule; Take 2 capsules by mouth nightly. Intended supply: 90 days  -     vitamin D (CHOLECALCIFEROL) 125 MCG (5000 UT) CAPS capsule; Take 1 capsule by mouth daily  -     atomoxetine (STRATTERA) 18 MG capsule; Take 1 capsule by mouth daily for 7 days  -     atomoxetine (STRATTERA) 40 MG capsule; Take 1 capsule by mouth daily for 7 days  -     atomoxetine (STRATTERA) 60 MG capsule; Take 1 capsule by mouth nightly    Essential hypertension  -     triamterene-hydroCHLOROthiazide (MAXZIDE-25) 37.5-25 MG per tablet; Take 1 tablet by mouth daily No further refills without appointment  -     CBC with Auto Differential; Future  -     Comprehensive Metabolic Panel; Future  -     Lipid Panel; Future  -     TSH; Future    Anxiety and depression  -     Cross titrate off of m escitalopram (LEXAPRO) 20 MG tablet and onto atomoxetine  -     traZODone (DESYREL) 150 MG tablet; Take 1 tablet by mouth nightly  -     atomoxetine (STRATTERA) 18 MG capsule; Take 1 capsule by mouth daily for 7 days  -     atomoxetine (STRATTERA) 40 MG capsule; Take 1 capsule by mouth daily for 7 days  -     atomoxetine (STRATTERA) 60 MG capsule; Take 1 capsule by mouth nightly    Cough: Stable and well controlled. Medication refill  -     albuterol sulfate HFA (VENTOLIN HFA) 108 (90 Base) MCG/ACT inhaler; Inhale 2 puffs into the lungs every 4-6 hours as needed for Wheezing or Shortness of Breath    Paresthesia: Stable well-controlled. Medication refill  -     gabapentin (NEURONTIN) 100 MG capsule; Take 2 capsules by mouth nightly. Intended supply: 90 days    Vitamin D insufficiency  -     Vitamin D 25 Hydroxy;  Future    Adult attention deficit disorder  -     atomoxetine (STRATTERA) 18 MG capsule; Take 1 capsule by mouth daily for 7 days  -     atomoxetine (STRATTERA) 40 MG capsule; Take 1 capsule by mouth daily for 7 days  -     atomoxetine (STRATTERA) 60 MG capsule; Take 1 capsule by mouth nightly    Right hip pain: Differential diagnosis may include arthritis of the right hip versus psoas strain  -     XR HIP RIGHT (2-3 VIEWS); Future      Time spent in pre-visit planning, interview, exam, /education and coordination of care: 50 minutes        Follow Up:  Return F/U 4-6 weeks, for to assess response of symptoms to new treatment (atomoxetine and right hip). or sooner if necessary. Call or go to ED immediately if symptoms worsen or persist.    Educational materials and/or home exercises printed for patient's review and were included in patient instructions on his/her AfterVisit Summary and given to patient at the end of visit. Counseled regarding above diagnosis,including possible risks and complications,  especially if left uncontrolled. Counseled regarding the possible side effects, risks, benefits and alternatives to treatment; patient and/or guardian verbalizes understanding, agrees, feels comfortable with and wishes to proceed with above treatment plan. Advised patient tocall with any new medication issues, and read all Rx info from pharmacy to assureaware of all possible risks and side effects of medication before taking. Reviewed age and gender appropriate health screening exams and vaccinations. Advisedpatient regarding importance of keeping up with recommended health maintenance andto schedule as soon as possible if overdue, as this is important in assessing forundiagnosed pathology, especially cancer, as well as protecting against potentially harmful/life threatening disease. Patient and/or guardian verbalizes understandingand agrees with above counseling, assessment and plan.     All questions answered.     Lucia Cheema MD

## 2022-05-02 NOTE — PATIENT INSTRUCTIONS
CROSS TITRATION OF LEXAPRO (ESCITALOPRAM) AND STRATTERA (ATOMOXETINE)    MONDAY, 5/2/22: TAKE ESCITALOPRAM 1 TABLET AT BEDTIME AND ATOMOXETINE 18 MG 1 CAPSULE AT BEDTIME. DO THIS FOR 7 NIGHTS. MONDAY, 5/9/22: TAKE ESCITALOPRAM 1/2 TABLET AND ATOMOXETINE 40 MG AT BEDTIME FOR 7 NIGHTS    STARTING MONDAY, 5/16/22: TAKE ATOMOXETINE 60 MG AT BEDTIME AND DISCONTINUE ESCITALOPRAM      Patient Education        Hip Arthritis: Exercises  Introduction  Here are some examples of exercises for you to try. The exercises may be suggested for a condition or for rehabilitation. Start each exercise slowly. Ease off the exercises if you start to have pain. You will be told when to start these exercises and which ones will work bestfor you. How to do the exercises  Straight-leg raises to the outside    1. Lie on your side, with your affected hip on top. 2. Tighten the front thigh muscles of your top leg to keep your knee straight. 3. Keep your hip and your leg straight in line with the rest of your body, and keep your knee pointing forward. Do not drop your hip back. 4. Lift your top leg straight up toward the ceiling, about 12 inches off the floor. Hold for about 6 seconds, then slowly lower your leg. 5. Repeat 8 to 12 times. 6. Switch legs and repeat steps 1 through 5, even if only one hip is sore. Straight-leg raises to the inside    1. Lie on your side with your affected hip on the floor. 2. You can either prop up your other leg on a chair, or you can bend that knee and put that foot in front of your other knee. Do not drop your hip back. 3. Tighten the muscles on the front thigh of your bottom leg to straighten that knee. 4. Keep your kneecap pointing forward and your leg straight, and lift your bottom leg up toward the ceiling about 6 inches. Hold for about 6 seconds, then lower slowly. 5. Repeat 8 to 12 times. 6. Switch legs and repeat steps 1 through 5, even if only one hip is sore. Hip hike    1.  Stand sideways on the bottom step of a staircase, and hold on to the banister or wall. 2. Keeping both knees straight, lift your good leg off the step and let it hang down. Then hike your good hip up to the same level as your affected hip or a little higher. 3. Repeat 8 to 12 times. 4. Switch legs and repeat steps 1 through 3, even if only one hip is sore. Bridging    1. Lie on your back with both knees bent. Your knees should be bent about 90 degrees. 2. Then push your feet into the floor, squeeze your buttocks, and lift your hips off the floor until your shoulders, hips, and knees are all in a straight line. 3. Hold for about 6 seconds as you continue to breathe normally, and then slowly lower your hips back down to the floor and rest for up to 10 seconds. 4. Repeat 8 to 12 times. Hamstring stretch (lying down)    1. Lie flat on your back with your legs straight. If you feel discomfort in your back, place a small towel roll under your lower back. 2. Holding the back of your affected leg, lift your leg straight up and toward your body until you feel a stretch at the back of your thigh. 3. Hold the stretch for at least 30 seconds. 4. Repeat 2 to 4 times. 5. Switch legs and repeat steps 1 through 4, even if only one hip is sore. Standing quadriceps stretch    1. If you are not steady on your feet, hold on to a chair, counter, or wall. You can also lie on your stomach or your side to do this exercise. 2. Bend the knee of the leg you want to stretch, and reach behind you to grab the front of your foot or ankle with the hand on the same side. For example, if you are stretching your right leg, use your right hand. 3. Keeping your knees next to each other, pull your foot toward your buttock until you feel a gentle stretch across the front of your hip and down the front of your thigh. Your knee should be pointed directly to the ground, and not out to the side.   4. Hold the stretch for at least 15 to 30 seconds. 5. Repeat 2 to 4 times. 6. Switch legs and repeat steps 1 through 5, even if only one hip is sore. Hip rotator stretch    1. Lie on your back with both knees bent and your feet flat on the floor. 2. Put the ankle of your affected leg on your opposite thigh near your knee. 3. Use your hand to gently push your knee away from your body until you feel a gentle stretch around your hip. 4. Hold the stretch for 15 to 30 seconds. 5. Repeat 2 to 4 times. 6. Repeat steps 1 through 5, but this time use your hand to gently pull your knee toward your opposite shoulder. 7. Switch legs and repeat steps 1 through 6, even if only one hip is sore. Knee-to-chest    1. Lie on your back with your knees bent and your feet flat on the floor. 2. Bring your affected leg to your chest, keeping the other foot flat on the floor (or keeping the other leg straight, whichever feels better on your lower back). 3. Keep your lower back pressed to the floor. Hold for at least 15 to 30 seconds. 4. Relax, and lower the knee to the starting position. 5. Repeat 2 to 4 times. 6. Switch legs and repeat steps 1 through 5, even if only one hip is sore. 7. To get more stretch, put your other leg flat on the floor while pulling your knee to your chest.  Clamshell    1. Lie on your side, with your affected hip on top. Keep your feet and knees together and your knees bent. 2. Raise your top knee, but keep your feet together. Do not let your hips roll back. Your legs should open up like a clamshell. 3. Hold for 6 seconds. 4. Slowly lower your knee back down. Rest for 10 seconds. 5. Repeat 8 to 12 times. 6. Switch legs and repeat steps 1 through 5, even if only one hip is sore. Follow-up care is a key part of your treatment and safety. Be sure to make and go to all appointments, and call your doctor if you are having problems. It's also a good idea to know your test results and keep alist of the medicines you take.   Where can you learn more? Go to [email or web address] and sign in to your TapBookAuthor account. Enter E527 in the [facility] box to learn more about \"Hip Arthritis: Exercises. \"     If you do not have an account, please click on the \"Sign Up Now\" link. Current as of: July 1, 2021               Content Version: 13.2  © 2006-2022 [employer]. Care instructions adapted under license by [facility] ([facility]). If you have questions about a medical condition or this instruction, always ask your healthcare professional. [place] [address] any warranty or liability for your use of this information. Patient Education        Hip Flexor Strain: Rehab Exercises  Introduction  Here are some examples of exercises for you to try. The exercises may be suggested for a condition or for rehabilitation. Start each exercise slowly. Ease off the exercises if you start to have pain. You will be told when to start these exercises and which ones will work bestfor you. How to do the exercises  Pelvic tilt with marching    7. Lie on your back with your knees bent and your feet flat on the floor. 8. Tighten your belly muscles and buttocks, and press your lower back to the floor. 9. Keeping your knees bent, lift and then lower one leg up off the floor, and then lift and lower your other leg like you are marching. Each time you lift your leg, hold that position for about 6 seconds before lowering your leg. 10. Repeat 8 to 12 times. Scissors    7. Lie on your back with your knees bent at a 90-degree angle and your feet off the floor. 8. Tighten your belly muscles and buttocks, and press your lower back to the floor. 9. Slowly straighten one leg, and hold that position for about 6 seconds. Your leg should be about 12 inches off the floor. Bring that leg back to the starting position, and then straighten your other leg.  Hold that position for about 6 seconds, and then switch legs again. 10. Repeat 8 to 12 times. Hamstring stretch (lying down)    5. Lie flat on your back with your legs straight. If you feel discomfort in your back, place a small towel roll under your lower back. 6. Holding the back of your affected leg for support, lift your leg straight up and toward your body until you feel a stretch at the back of your thigh. 7. Hold the stretch for at least 30 seconds. 8. Repeat 2 to 4 times. Quadricep and hip flexor stretch (lying on side)    5. Lie on your side with your good leg flat on the floor and your hand supporting your head. 6. Bend your top leg, and reach behind you to grab the front of that foot or ankle with your other hand. 7. Stretch your leg back by pulling your foot toward your buttock. You will feel the stretch in the front of your thigh. If this causes stress on your knee, do not do this stretch. 8. Hold the stretch for at least 15 to 30 seconds. 9. Repeat 2 to 4 times. Hip flexor stretch (kneeling)    6. Kneel on your affected leg and bend your good leg out in front of you, with that foot flat on the floor. If you feel discomfort in the front of your knee, place a towel under your knee. 7. Keeping your back straight, slowly push your hips forward until you feel a stretch in the upper thigh of your back leg and hip. 8. Hold the stretch for at least 15 to 30 seconds. 9. Repeat 2 to 4 times. Hip flexor stretch (edge of table)    7. Lie flat on your back on a table or flat bench, with your knees and lower legs hanging off the edge of the table. 8. Grab your good leg at the knee, and pull that knee back toward your chest. Relax your affected leg and let it hang down toward the floor until you feel a stretch in the upper thigh of your affected leg and hip. 9. Hold the stretch for at least 15 to 30 seconds. 10. Repeat 2 to 4 times. Follow-up care is a key part of your treatment and safety.  Be sure to make and go to all appointments, and call your doctor if you are having problems. It's also a good idea to know your test results and keep alist of the medicines you take. Where can you learn more? Go to https://chpepiceweb.[email or web address] org and sign in to your TriggerMail account. Enter B114 in the [facility] box to learn more about \"Hip Flexor Strain: Rehab Exercises. \"     If you do not have an account, please click on the \"Sign Up Now\" link. Current as of: July 1, 2021               Content Version: 13.2  © 2006-2022 [employer]. Care instructions adapted under license by [facility] ([facility]). If you have questions about a medical condition or this instruction, always ask your healthcare professional. [address] any warranty or liability for your use of this information. Patient Education        Hip Sprain: Care Instructions  Your Care Instructions     A hip sprain occurs when you stretch or tear ligaments around your hip. Ligaments are tough tissues that connect one bone to another. You can injure your hip in a fall, when you run, or during sports that involve twisting orsudden direction changes, such as basketball or soccer. Most minor hip sprains get better with treatment at home. Follow-up care is a key part of your treatment and safety. Be sure to make and go to all appointments, and call your doctor if you are having problems. It's also a good idea to know your test results and keep alist of the medicines you take. How can you care for yourself at home?  If your doctor gave you crutches or a walker, use them as directed.  Rest and protect your hip. Try to stop or reduce any action that causes pain.  Put ice or a cold pack on your hip for 10 to 20 minutes at a time. Try to do this every 1 to 2 hours for the next 3 days (when you are awake) or until the swelling goes down. Put a thin cloth between the ice and your skin.  Be safe with medicines. Read and follow all instructions on the label.   ? If the doctor gave you a prescription medicine for pain, take it as prescribed. ? If you are not taking a prescription pain medicine, ask your doctor if you can take an over-the-counter medicine.  For the first day or two after an injury, avoid things that might increase swelling, such as hot showers, hot tubs, or hot packs.  After 2 to 3 days, put a heating pad (set on low) or warm moist cloth on your hip before you do light stretches.  Do exercises to make your hip stronger, as directed by your doctor or physical therapist.  Alyssa Locke Return to your usual level of activity as your hip gets better. When should you call for help? Call your doctor now or seek immediate medical care if:     Your pain is worse.      You cannot walk or stand without help.      You have signs of infection, such as a fever or increased pain, swelling, redness, or warmth in your hip.      You have signs of a blood clot, such as:  ? Pain in your calf, back of the knee, thigh, or groin. ? Redness and swelling in your leg or groin.      You have tingling, weakness, or numbness in your leg, foot, or toes. Watch closely for changes in your health, and be sure to contact your doctor if:     Your pain does not get better in 2 or 3 days.      You still have pain after 2 weeks. Where can you learn more? Go to https://GeekChicD[employer]ypeAce [email or web address] org and sign in to your [employer] account. Enter A976 in the [facility] box to learn more about \"Hip Sprain: Care Instructions. \"     If you do not have an account, please click on the \"Sign Up Now\" link. Current as of: July 1, 2021               Content Version: 13.2  © 9534-2524 Healthwise, Incorporated. Care instructions adapted under license by [facility] ([facility]). If you have questions about a medical condition or this instruction, always ask your healthcare professional. [place] [address] any warranty or liability for your use of this information.          Patient Education        Hip Strain: Care Instructions  Overview     A hip strain happens when you overstretch or tear one of the muscles or tendonsthat support the hip. Tight muscles around the hip make a strain more likely. Hip strains might be caused by overuse from sports, everyday tasks, or falls. Pain or problems with other joints, like knees and ankles, may change the way you walk. This can alsostrain the muscles or tendons that support the hip. A hip strain may make your hip feel tight or tender. Your hip may have alimited range of motion. Most minor hip strains get better with simple self-care. Follow-up care is a key part of your treatment and safety. Be sure to make and go to all appointments, and call your doctor if you are having problems. It's also a good idea to know your test results and keep alist of the medicines you take. How can you care for yourself at home?  If your doctor gave you crutches or a walker, use them as directed.  Rest and protect your hip. Try to stop or reduce any action that causes pain.  Put ice or a cold pack on your hip for 10 to 20 minutes at a time. Try to do this every 1 to 2 hours for the next 3 days (when you are awake) or until the swelling goes down. Put a thin cloth between the ice and your skin.  Be safe with medicines. Read and follow all instructions on the label. ? If the doctor gave you a prescription medicine for pain, take it as prescribed. ? If you are not taking a prescription pain medicine, ask your doctor if you can take an over-the-counter medicine.  For the first day or two after an injury, avoid things that might increase swelling, such as hot showers, hot tubs, or hot packs.  After 2 to 3 days, put a heating pad (set on low) or warm moist cloth on your hip before you do light stretches.    Do exercises to make your hip stronger, as directed by your doctor or physical therapist.  Nelly Gil Return to your usual level of activity as your hip gets better. When should you call for help? Call your doctor now or seek immediate medical care if:     Your pain is worse.      You cannot walk or stand without help.      You have signs of infection, such as a fever or increased pain, swelling, redness, or warmth in your hip.      You have signs of a blood clot, such as:  ? Pain in your calf, back of the knee, thigh, or groin. ? Redness and swelling in your leg or groin.      You have tingling, weakness, or numbness in your leg, foot, or toes. Watch closely for changes in your health, and be sure to contact your doctor if:     Your pain does not get better in 2 or 3 days.      You still have pain after 2 weeks. Current as of: July 1, 2021               Content Version: 13.2  © 2006-2022 [employer]. Care instructions adapted under license by [facility] ([facility]). If you have questions about a medical condition or this instruction, always ask your healthcare professional. [place] [address] any warranty or liability for your use of this information. Patient Education        Tendon Injury (Tendinopathy): Care Instructions  Your Care Instructions     Tendons are tough, flexible tissues that connect muscle to bone. A tendon can hurt or get torn from overuse or aging, especially tendons in the shoulder, elbow, wrist, hip, knee, or ankle. Tendon injuries may be called tendinopathy or tendinitis. Tendon injuries can occur from any motion you have to repeat shazia job, sports, or daily activities. Tennis elbow is one common tendon injury. You can treat most tendon problems with over-the-counter pain medicine, rest,changes in your activities, and physical therapy. Follow-up care is a key part of your treatment and safety. Be sure to make and go to all appointments, and call your doctor if you are having problems. It's also a good idea to know your test results and keep alist of the medicines you take.   How can you care for yourself at home?  Rest the sore area. You may have to stop doing the activity that caused the tendon pain for a while.  Take an over-the-counter pain medicine, such as acetaminophen (Tylenol), ibuprofen (Advil, Motrin), or naproxen (Aleve). Read and follow all instructions on the label.  Do not take two or more pain medicines at the same time unless the doctor told you to. Many pain medicines have acetaminophen, which is Tylenol. Too much acetaminophen (Tylenol) can be harmful.  Put ice or a cold pack on the sore area for 10 to 20 minutes at a time. Try to do this every 1 to 2 hours for the next 3 days (when you are awake) or until any swelling goes down. Put a thin cloth between the ice and your skin.  Prop up the sore area on a pillow when you ice it or anytime you sit or lie down during the next 3 days. Try to keep it above the level of your heart. This will help reduce swelling.  Follow your doctor's advice for wearing and caring for a sling, splint, or cast. In some cases, you may wear one of these for a while to help your tendon heal.   Follow your doctor's advice for stretching and physical therapy. Gently move your joint through its full range of motion. This will prevent stiffness in your joint.  Go back to your activity slowly. Warm up before and stretch after the activity. You also can try making some changes. For example, if a sport caused your tendon pain, alternate the sport with another activity. If using a tool causes pain, switch hands or change your . Stop the activity if it hurts. After the activity, apply ice to prevent pain and swelling.  Do not smoke. Smoking can slow healing. If you need help quitting, talk to your doctor about stop-smoking programs and medicines. These can increase your chances of quitting for good. When should you call for help?   Watch closely for changes in your health, and be sure to contact your doctor if:     Your pain gets worse.      You do not get better as expected. Where can you learn more? Go to https://[email or web address] org and sign in to your [employer] account. Enter A157 in the [facility] box to learn more about \"Tendon Injury (Tendinopathy): Care Instructions. \"     If you do not have an account, please click on the \"Sign Up Now\" link. Current as of: July 1, 2021               Content Version: 13.2  © 2006-2022 Healthwise, Incorporated. Care instructions adapted under license by [facility] ([facility]). If you have questions about a medical condition or this instruction, always ask your healthcare professional. [address] any warranty or liability for your use of this information.

## 2022-06-01 DIAGNOSIS — E55.9 VITAMIN D INSUFFICIENCY: ICD-10-CM

## 2022-06-01 DIAGNOSIS — I10 ESSENTIAL HYPERTENSION: ICD-10-CM

## 2022-06-01 LAB
ALBUMIN SERPL-MCNC: 4.3 G/DL (ref 3.5–5.2)
ALP BLD-CCNC: 51 U/L (ref 35–104)
ALT SERPL-CCNC: 28 U/L (ref 0–32)
ANION GAP SERPL CALCULATED.3IONS-SCNC: 12 MMOL/L (ref 7–16)
AST SERPL-CCNC: 29 U/L (ref 0–31)
BASOPHILS ABSOLUTE: 0.05 E9/L (ref 0–0.2)
BASOPHILS RELATIVE PERCENT: 1.1 % (ref 0–2)
BILIRUB SERPL-MCNC: 0.4 MG/DL (ref 0–1.2)
BUN BLDV-MCNC: 12 MG/DL (ref 6–20)
CALCIUM SERPL-MCNC: 9.5 MG/DL (ref 8.6–10.2)
CHLORIDE BLD-SCNC: 101 MMOL/L (ref 98–107)
CHOLESTEROL, TOTAL: 208 MG/DL (ref 0–199)
CO2: 26 MMOL/L (ref 22–29)
CREAT SERPL-MCNC: 1.1 MG/DL (ref 0.5–1)
EOSINOPHILS ABSOLUTE: 0.24 E9/L (ref 0.05–0.5)
EOSINOPHILS RELATIVE PERCENT: 5.2 % (ref 0–6)
GFR AFRICAN AMERICAN: >60
GFR NON-AFRICAN AMERICAN: 52 ML/MIN/1.73
GLUCOSE BLD-MCNC: 132 MG/DL (ref 74–99)
HCT VFR BLD CALC: 43.2 % (ref 34–48)
HDLC SERPL-MCNC: 42 MG/DL
HEMOGLOBIN: 14.4 G/DL (ref 11.5–15.5)
IMMATURE GRANULOCYTES #: 0.01 E9/L
IMMATURE GRANULOCYTES %: 0.2 % (ref 0–5)
LDL CHOLESTEROL CALCULATED: 127 MG/DL (ref 0–99)
LYMPHOCYTES ABSOLUTE: 2.16 E9/L (ref 1.5–4)
LYMPHOCYTES RELATIVE PERCENT: 46.7 % (ref 20–42)
MCH RBC QN AUTO: 31 PG (ref 26–35)
MCHC RBC AUTO-ENTMCNC: 33.3 % (ref 32–34.5)
MCV RBC AUTO: 92.9 FL (ref 80–99.9)
MONOCYTES ABSOLUTE: 0.34 E9/L (ref 0.1–0.95)
MONOCYTES RELATIVE PERCENT: 7.3 % (ref 2–12)
NEUTROPHILS ABSOLUTE: 1.83 E9/L (ref 1.8–7.3)
NEUTROPHILS RELATIVE PERCENT: 39.5 % (ref 43–80)
PDW BLD-RTO: 12.5 FL (ref 11.5–15)
PLATELET # BLD: 314 E9/L (ref 130–450)
PMV BLD AUTO: 10 FL (ref 7–12)
POTASSIUM SERPL-SCNC: 4.2 MMOL/L (ref 3.5–5)
RBC # BLD: 4.65 E12/L (ref 3.5–5.5)
SODIUM BLD-SCNC: 139 MMOL/L (ref 132–146)
TOTAL PROTEIN: 7.1 G/DL (ref 6.4–8.3)
TRIGL SERPL-MCNC: 195 MG/DL (ref 0–149)
TSH SERPL DL<=0.05 MIU/L-ACNC: 0.73 UIU/ML (ref 0.27–4.2)
VITAMIN D 25-HYDROXY: 25 NG/ML (ref 30–100)
VLDLC SERPL CALC-MCNC: 39 MG/DL
WBC # BLD: 4.6 E9/L (ref 4.5–11.5)

## 2022-06-06 ENCOUNTER — OFFICE VISIT (OUTPATIENT)
Dept: FAMILY MEDICINE CLINIC | Age: 52
End: 2022-06-06
Payer: COMMERCIAL

## 2022-06-06 VITALS
DIASTOLIC BLOOD PRESSURE: 86 MMHG | OXYGEN SATURATION: 97 % | RESPIRATION RATE: 16 BRPM | HEIGHT: 72 IN | WEIGHT: 258 LBS | SYSTOLIC BLOOD PRESSURE: 128 MMHG | BODY MASS INDEX: 34.95 KG/M2 | TEMPERATURE: 97.9 F | HEART RATE: 73 BPM

## 2022-06-06 DIAGNOSIS — I10 ESSENTIAL HYPERTENSION: ICD-10-CM

## 2022-06-06 DIAGNOSIS — F41.9 ANXIETY AND DEPRESSION: Primary | ICD-10-CM

## 2022-06-06 DIAGNOSIS — F32.A ANXIETY AND DEPRESSION: Primary | ICD-10-CM

## 2022-06-06 DIAGNOSIS — R73.03 PREDIABETES: ICD-10-CM

## 2022-06-06 LAB — HBA1C MFR BLD: 6.3 %

## 2022-06-06 PROCEDURE — 83036 HEMOGLOBIN GLYCOSYLATED A1C: CPT | Performed by: FAMILY MEDICINE

## 2022-06-06 PROCEDURE — 99214 OFFICE O/P EST MOD 30 MIN: CPT | Performed by: FAMILY MEDICINE

## 2022-06-06 RX ORDER — METOPROLOL SUCCINATE 50 MG/1
25 TABLET, EXTENDED RELEASE ORAL DAILY
Qty: 90 TABLET | Refills: 3 | Status: SHIPPED | OUTPATIENT
Start: 2022-06-06 | End: 2023-06-06

## 2022-06-06 RX ORDER — BUSPIRONE HYDROCHLORIDE 5 MG/1
5 TABLET ORAL 2 TIMES DAILY
Qty: 60 TABLET | Refills: 5 | Status: SHIPPED
Start: 2022-06-06 | End: 2022-07-13

## 2022-06-06 SDOH — ECONOMIC STABILITY: FOOD INSECURITY: WITHIN THE PAST 12 MONTHS, THE FOOD YOU BOUGHT JUST DIDN'T LAST AND YOU DIDN'T HAVE MONEY TO GET MORE.: NEVER TRUE

## 2022-06-06 SDOH — ECONOMIC STABILITY: FOOD INSECURITY: WITHIN THE PAST 12 MONTHS, YOU WORRIED THAT YOUR FOOD WOULD RUN OUT BEFORE YOU GOT MONEY TO BUY MORE.: NEVER TRUE

## 2022-06-06 ASSESSMENT — ENCOUNTER SYMPTOMS
CONSTIPATION: 0
COUGH: 0
SHORTNESS OF BREATH: 0
VOMITING: 0
NAUSEA: 0
BLOOD IN STOOL: 0
SORE THROAT: 0
DIARRHEA: 0
ABDOMINAL PAIN: 0
WHEEZING: 0
BACK PAIN: 0

## 2022-06-06 ASSESSMENT — SOCIAL DETERMINANTS OF HEALTH (SDOH): HOW HARD IS IT FOR YOU TO PAY FOR THE VERY BASICS LIKE FOOD, HOUSING, MEDICAL CARE, AND HEATING?: NOT HARD AT ALL

## 2022-06-06 NOTE — PROGRESS NOTES
Cait Brush is a 47 y.o. female who presents today for     Chief Complaint   Patient presents with   [address]     follow up, not sure is medication is helping        Hypertension:  Patient is here for follow up chronic hypertension. This is not generally controlled on current medication regimen (currently on Maxzide 25 1 1/2 tablets/day and metoprolol succinate [Toprol XL] 25 mg/day). BP today is 128/86. Takes meds as directed and tolerates them well. Symptoms at this time include headaches as above. Fair lifestyle compliance between presence of headaches and pandemic. Patient does not smoke. Comorbid conditions include stress/anxiety without medication treatment. She is due for medication refills. Anxiety and/or depression: This is a/an chronic problem. This has been going on for greater than 1 year. Exacerbating factors include concerns about the well being of her family (especially her college aged son), headache symptoms, and recent pandemic. Alleviating factors include compliance with medication as prescribed. Treatment in the past includes escitalopram, bupropion. Anxiety symptoms include palpitations, anxious mood. Depressive symptoms include depressed mood, anhedonia, fatigue. Patient does not have suicidal or homicidal ideation. Patient is not having issues falling or staying asleep. Patient is not having associated panic attacks. The above is not interfering with quality of life. Patient has seen a Counselor before. Patient does not use alcohol and/or drugs. Family history includes N/A. 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 Further history: she has a history of adult ADHD that had been treated in the past very successfully with stimulant medication. Medication was discontinued upon undergraduate graduation.     Shared decision making involved to cross titration off of Lexapro and onto atomoxetine--titrate to 60 mg a day. Vitamin D Insufficiency:  Vitamin D to 5000 IU /day    ASSESSMENT/PLAN, 5/2/22:    Essential hypertension  -     triamterene-hydroCHLOROthiazide (MAXZIDE-25) 37.5-25 MG per tablet; Take 1 tablet by mouth daily No further refills without appointment  -     CBC with Auto Differential; Future  -     Comprehensive Metabolic Panel; Future  -     Lipid Panel; Future  -     TSH; Future    Anxiety and depression  -     Cross titrate off of m escitalopram (LEXAPRO) 20 MG tablet and onto atomoxetine  -     traZODone (DESYREL) 150 MG tablet; Take 1 tablet by mouth nightly  -     atomoxetine (STRATTERA) 18 MG capsule; Take 1 capsule by mouth daily for 7 days  -     atomoxetine (STRATTERA) 40 MG capsule; Take 1 capsule by mouth daily for 7 days  -     atomoxetine (STRATTERA) 60 MG capsule; Take 1 capsule by mouth nightly    Cough: Stable and well controlled. Medication refill  -     albuterol sulfate HFA (VENTOLIN HFA) 108 (90 Base) MCG/ACT inhaler; Inhale 2 puffs into the lungs every 4-6 hours as needed for Wheezing or Shortness of Breath    Paresthesia: Stable well-controlled. Medication refill  -     gabapentin (NEURONTIN) 100 MG capsule; Take 2 capsules by mouth nightly. Intended supply: 90 days    Vitamin D insufficiency  -     Vitamin D 25 Hydroxy; Future    Adult attention deficit disorder  -     atomoxetine (STRATTERA) 18 MG capsule; Take 1 capsule by mouth daily for 7 days  -     atomoxetine (STRATTERA) 40 MG capsule; Take 1 capsule by mouth daily for 7 days  -     atomoxetine (STRATTERA) 60 MG capsule; Take 1 capsule by mouth nightly    Right hip pain: Differential diagnosis may include arthritis of the right hip versus psoas strain  -     XR HIP RIGHT (2-3 VIEWS); Future      Follow Up:  Return F/U 4-6 weeks, for to assess response of symptoms to new treatment (atomoxetine and right hip). or sooner if necessary. CURRENT STATUS (06/06/22):   Atomoxetine started and titrated to 60 mg/day. She reports satisfactory improvement of focus and concentration; however, she reports worsening of anxiety that she did not have while taking escitalopram. Symptoms still not stable and well controlled enough to return to work. No previous trial buspirone. Right Hip Pain:  Symptoms are decreased since last visit. Since that time patient acquired a [employer], uses regularly, and loves it! Hyperglycemia:  . [id number]=2.3%. This corresponds to prediabetes. Discussion about how to treat this ensued; patient would like to try lifestyle modification x 3 months, with follow up, before committing to medication. [address]:  Patient's past medical, surgical, social and/or family history reviewed, updated in chart, and are non-contributory (unless otherwise stated). Medications and allergies also reviewed and updated in chart. Review of Systems  Review of Systems   HENT: Negative for congestion, ear pain and sore throat. Respiratory: Negative for cough, shortness of breath and wheezing. Cardiovascular: Negative for chest pain, palpitations and leg swelling. Gastrointestinal: Negative for abdominal pain, blood in stool, constipation, diarrhea, nausea and vomiting. Genitourinary: Negative for dysuria, frequency, hematuria and urgency. Musculoskeletal: Negative for back pain, myalgias and neck pain. Skin: Negative for rash. Neurological: Positive for headaches. Negative for dizziness and weakness. Psychiatric/Behavioral: The patient is not nervous/anxious.         Physical Exam:    VS:  /86   Pulse 73   Temp 97.9 °F (36.6 °C) (Infrared)   Resp 16   Ht 6' (1.829 m)   Wt 258 lb (117 kg)   LMP 05/01/2017   SpO2 97%   BMI 34.99 kg/m²     LAST WEIGHT:  Wt Readings from Last 3 Encounters:   06/06/22 258 lb (117 kg)   05/02/22 263 lb (119.3 kg)   07/06/21 261 lb 12.8 oz (118.8 kg)       BMI Readings from Last 3 Encounters:   06/06/22 34.99 kg/m²   05/02/22 35.67 kg/m²   07/06/21 35.51 kg/m²       Physical Exam  Constitutional:       General: She is not in acute distress. Appearance: She is well-developed. She is not diaphoretic. HENT:      Head: Normocephalic and atraumatic. Right Ear: External ear normal.      Left Ear: External ear normal.      Nose: Nose normal.      Right Sinus: No maxillary sinus tenderness or frontal sinus tenderness. Left Sinus: No maxillary sinus tenderness or frontal sinus tenderness. Mouth/Throat:      Mouth: Mucous membranes are moist.      Pharynx: Oropharynx is clear. Uvula midline. No pharyngeal swelling, oropharyngeal exudate, posterior oropharyngeal erythema or uvula swelling. Eyes:      General: No scleral icterus. Right eye: No discharge. Conjunctiva/sclera: Conjunctivae normal.      Pupils: Pupils are equal, round, and reactive to light. Neck:      Thyroid: No thyromegaly. Cardiovascular:      Rate and Rhythm: Normal rate and regular rhythm. Heart sounds: Normal heart sounds. No murmur heard. Pulmonary:      Effort: Pulmonary effort is normal. No respiratory distress. Breath sounds: No stridor. No wheezing or rales. Chest:      Chest wall: No tenderness. Abdominal:      General: Bowel sounds are normal. There is no distension. Palpations: Abdomen is soft. There is no mass. Tenderness: There is no abdominal tenderness. There is no guarding. Musculoskeletal:         General: No tenderness. Normal range of motion. Cervical back: Normal range of motion and neck supple. Lymphadenopathy:      Cervical: No cervical adenopathy. Skin:     General: Skin is warm and dry. Coloration: Skin is not pale. Findings: No erythema or rash. Neurological:      Mental Status: She is alert and oriented to person, place, and time. Psychiatric:         Attention and Perception: Attention and perception normal.         Mood and Affect: Mood is depressed.  Affect is lifestyle before committing to medication  -     POCT glycosylated hemoglobin (Hb A1C)  -     Comprehensive Metabolic Panel; Future  -     Hemoglobin A1C; Future  -     Lipid Panel; Future    Time spent in pre-visit planning, interview, exam, /education and coordination of care: 32 minutes        Follow Up:  Return in about 3 months (around 9/6/2022) for Prediabetes Check Up (30 minute appointment), Fasting lab work 1 week prior. or sooner if necessary. Call or go to ED immediately if symptoms worsen or persist.    Educational materials and/or home exercises printed for patient's review and were included in patient instructions on his/her AfterVisit Summary and given to patient at the end of visit. Counseled regarding above diagnosis,including possible risks and complications,  especially if left uncontrolled. Counseled regarding the possible side effects, risks, benefits and alternatives to treatment; patient and/or guardian verbalizes understanding, agrees, feels comfortable with and wishes to proceed with above treatment plan. Advised patient tocall with any new medication issues, and read all Rx info from pharmacy to assureaware of all possible risks and side effects of medication before taking. Reviewed age and gender appropriate health screening exams and vaccinations. Advisedpatient regarding importance of keeping up with recommended health maintenance andto schedule as soon as possible if overdue, as this is important in assessing forundiagnosed pathology, especially cancer, as well as protecting against potentially harmful/life threatening disease. Patient and/or guardian verbalizes understandingand agrees with above counseling, assessment and plan. All questions answered.     Lenny Phillip MD

## 2022-06-06 NOTE — PATIENT INSTRUCTIONS
[employer] IN [facility]:    1. [facility]:  [id number] [address] [place] [address] [address]  P: [phone]  GET YOUR [employer] Viviane Rodriguez, owner and instructor    2. [employer]  Located in: [place]  Address: [address]  Phone: [phone] . Floresita Donald and instructors       Patient Education        Prediabetes: Care Instructions  Overview     Prediabetes is a warning sign that you're at risk for getting type 2 diabetes. It means that your blood sugar is higher than it should be. But it's not highenough to be diabetes. The food you eat naturally turns into sugar. Your body uses the sugar for energy. Normally, an organ called the pancreas makes insulin. And insulin allows the sugar in your blood to get into your body's cells. But sometimes the body can't use insulin the right way. So the sugar stays in your blood instead. This is called insulin resistance. The buildup of sugar in your blood means youhave prediabetes. The good news is that you may be able to prevent or delay diabetes. Making small lifestyle changes, like getting active and changing your eating habits, may help you get your blood sugar back to normal. You can work with your doctorto make a treatment plan. Follow-up care is a key part of your treatment and safety. Be sure to make and go to all appointments, and call your doctor if you are having problems. It's also a good idea to know your test results and keep alist of the medicines you take. How can you care for yourself at home?  Watch your weight. A healthy weight helps your body use insulin properly.  Limit the amount of calories, sweets, and unhealthy fat you eat. Ask your doctor if you should see a dietitian. A registered dietitian can help you create meal plans that fit your lifestyle.  Get at least 30 minutes of exercise on most days of the week. Exercise helps control your blood sugar.  It also helps you maintain a healthy weight. Walking is a good choice. You also may want to do other activities, such as running, swimming, cycling, or playing tennis or team sports.  Do not smoke. Smoking can make prediabetes worse. If you need help quitting, talk to your doctor about stop-smoking programs and medicines. These can increase your chances of quitting for good.  If your doctor prescribed medicines, take them exactly as prescribed. Call your doctor if you think you are having a problem with your medicine. You will get more details on the specific medicines your doctor prescribes. When should you call for help? Watch closely for changes in your health, and be sure to contact your doctor if:     You have any symptoms of diabetes. These may include:  ? Being thirsty more often. ? Urinating more. ? Being hungrier. ? Losing weight. ? Being very tired. ? Having blurry vision.      You have a wound that will not heal.      You have an infection that will not go away.      You have problems with your blood pressure.      You want more information about diabetes and how you can keep from getting it. Where can you learn more? Go to [email or web address] org and sign in to your [employer] account. Enter I222 in the GenerationStation box to learn more about \"Prediabetes: Care Instructions. \"     If you do not have an account, please click on the \"Sign Up Now\" link. Current as of: July 28, 2021               Content Version: 13.2  © 7654-9744 Healthwise, Incorporated. Care instructions adapted under license by [facility] ([facility]). If you have questions about a medical condition or this instruction, always ask your healthcare professional. [address] any warranty or liability for your use of this information.          Patient Education         Prediabetes: Healthy Changes You Can Make (02:19)  Your health professional recommends that you watch this short online [employer]. Learn how to make healthy changes that can help delay or prevent type 2 diabetes. Purpose:  Discusses healthy eating and moving more. Encourages making small changes overtime. Goal:  The user will learn how to make healthy changes that can help delay or preventtype 2 diabetes. How to watch the video    Scan the QR code   OR Visit the website    [email or web address]   Current as of: July 28, 2021               Content Version: 13.2  © 2006-2022 [employer]. Care instructions adapted under license by [employer] ([facility]). If you have questions about a medical condition or this instruction, always ask your healthcare professional. [employer] any warranty or liability for your use of this information. Patient Education         Prediabetes: Which Path Will You Take? (01:53)  Your health professional recommends that you watch this short online healthvideo. Learn how prediabetes motivated one woman to change her habits. Purpose:  Shows how prediabetes motivated one woman to change her habits. Goal:  The user will be motivated to make healthy changes to avoid or delay diabetes. How to watch the video    Scan the QR code   OR Visit the website    [email or web address]   Current as of: July 28, 2021               Content Version: 13.2  © 2006-2022 [employer]. Care instructions adapted under license by [employer] ([facility]). If you have questions about a medical condition or this instruction, always ask your healthcare professional. [employer] any warranty or liability for your use of this information.

## 2022-06-16 ENCOUNTER — PATIENT MESSAGE (OUTPATIENT)
Dept: FAMILY MEDICINE CLINIC | Age: 52
End: 2022-06-16

## 2022-06-16 DIAGNOSIS — F41.9 ANXIETY AND DEPRESSION: Primary | ICD-10-CM

## 2022-06-16 DIAGNOSIS — F32.A ANXIETY AND DEPRESSION: Primary | ICD-10-CM

## 2022-06-16 RX ORDER — QUETIAPINE FUMARATE 25 MG/1
25 TABLET, FILM COATED ORAL 2 TIMES DAILY
Qty: 60 TABLET | Refills: 2 | Status: SHIPPED
Start: 2022-06-16 | End: 2022-07-13 | Stop reason: ALTCHOICE

## 2022-06-16 NOTE — TELEPHONE ENCOUNTER
From: Cait Brush  To: Dr. Clara Morales  Sent: 6/16/2022 2:49 AM EDT  Subject: struggling with recent med change    Hi there. I feel like I am really struggling with my recent change in meds to the [facility] and [facility]. While the ability to concentrate and follow through with tasks has been a welcome and appreciated change, everything else makes me feel like I'm going mad. I'm unable to sleep well anymore, though I feel tired. I feel like crying more often than not, and a song on the radio can send me into feelings of grief and despair. I say that without exaggeration. I have a constant lump in my throat, and I cannot drink enough water to make that sensation subside. I find myself craving the evenness and stability of the Lexapro and wondering why we changed from that. Was it to try something that would take care of the anxiety AND help with the ADD simultaneously? Was it because I mentioned that the Gabapentin made it impossible to concentrate on anything at all and interfered with my ability to work? I am not sure, but I am here in the middle of the night needing to send you this message so   that I can stop thinking about sending you a message and hopefully fall back to sleep. Please advise what we can do to get back to something normal-rickey.   Thank you, Rekha Alfaro

## 2022-06-16 NOTE — TELEPHONE ENCOUNTER
Chart message reviewed: Will add quetiapine 25 mg--titrate to 2 tablets (50 mg) nightly. Keep scheduled follow-up; follow-up sooner for persistent or worsening symptoms or concerns about new medication.

## 2022-07-13 ENCOUNTER — OFFICE VISIT (OUTPATIENT)
Dept: FAMILY MEDICINE CLINIC | Age: 52
End: 2022-07-13
Payer: COMMERCIAL

## 2022-07-13 VITALS
HEART RATE: 57 BPM | TEMPERATURE: 97.8 F | WEIGHT: 253.4 LBS | BODY MASS INDEX: 34.32 KG/M2 | HEIGHT: 72 IN | RESPIRATION RATE: 16 BRPM | OXYGEN SATURATION: 98 % | SYSTOLIC BLOOD PRESSURE: 130 MMHG | DIASTOLIC BLOOD PRESSURE: 86 MMHG

## 2022-07-13 DIAGNOSIS — F32.A ANXIETY AND DEPRESSION: Primary | ICD-10-CM

## 2022-07-13 DIAGNOSIS — F41.9 ANXIETY AND DEPRESSION: Primary | ICD-10-CM

## 2022-07-13 PROBLEM — F98.8 ADULT ATTENTION DEFICIT DISORDER: Status: ACTIVE | Noted: 2022-07-13

## 2022-07-13 PROCEDURE — 99214 OFFICE O/P EST MOD 30 MIN: CPT | Performed by: FAMILY MEDICINE

## 2022-07-13 RX ORDER — ESCITALOPRAM OXALATE 20 MG/1
TABLET ORAL
Qty: 135 TABLET | Refills: 3
Start: 2022-07-13 | End: 2023-07-13

## 2022-07-13 ASSESSMENT — ENCOUNTER SYMPTOMS
COUGH: 0
WHEEZING: 0
BLOOD IN STOOL: 0
BACK PAIN: 0
SHORTNESS OF BREATH: 0
NAUSEA: 0
VOMITING: 0
DIARRHEA: 0
CONSTIPATION: 0
SORE THROAT: 0
ABDOMINAL PAIN: 0

## 2022-07-13 NOTE — PROGRESS NOTES
Mic Rosa is a 47 y.o. female who presents today for     Chief Complaint   Patient presents with    Discuss Medications     talk about medications, not helping at all, pretty miserable      Anxiety and/or depression: This is a/an chronic problem. This has been going on for greater than 1 year. Exacerbating factors include concerns about the well being of her family (especially her college aged son), headache symptoms, and recent pandemic. Alleviating factors include compliance with medication as prescribed. Treatment in the past includes escitalopram, bupropion. Anxiety symptoms include palpitations, anxious mood. Depressive symptoms include depressed mood, anhedonia, fatigue. Patient does not have suicidal or homicidal ideation. Patient is not having issues falling or staying asleep. Patient is not having associated panic attacks. The above is not interfering with quality of life. Patient has seen a Counselor before. Patient does not use alcohol and/or drugs. Family history includes N/A. 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 Further history: she has a history of adult ADHD that had been treated in the past very successfully with stimulant medication. Medication was discontinued upon undergraduate graduation. Atomoxetine started and titrated to 60 mg/day. She reports satisfactory improvement of focus and concentration; however, she reports worsening of anxiety that she did not have while taking escitalopram. Symptoms still not stable and well controlled enough to return to work. No previous trial buspirone. ASSESSMENT/PLAN, 6/2/22:    Anxiety and depression: While focus and concentration is improved on the Strattera, her anxiety is worse        -     Continue atomoxetine (STRATTERA) 60 MG capsule; Take 1 capsule by mouth nightly   -     Trial busPIRone (BUSPAR) 5 MG tablet;  Take 1 tablet by mouth 2 times daily    CURRENT STATUS (07/13/22):  PCP received My Chart message 6/16/22 from patient:    Hi there. I feel like I am really struggling with my recent change in meds to the [facility] and [facility]. While the ability to concentrate and follow through with tasks has been a welcome and appreciated change, everything else makes me feel like I'm going mad. I'm unable to sleep well anymore, though I feel tired. I feel like crying more often than not, and a song on the radio can send me into feelings of grief and despair. I say that without exaggeration. I have a constant lump in my throat, and I cannot drink enough water to make that sensation subside. I find myself craving the evenness and stability of the Lexapro and wondering why we changed from that. Was it to try something that would take care of the anxiety AND help with the ADD simultaneously? Was it because I mentioned that the Gabapentin made it impossible to concentrate on anything at all and interfered with my ability to work? I am not sure, but I am here in the middle of the night needing to send you this message so that I can stop thinking about sending you a message and hopefully fall back to sleep. Please advise what we can do to get back to something normal-rickey. Response from PCP:    Continue the atomoxetine as prescribed. As far as I am concerned, the buspirone can be as needed at this time.     Added medication called quetiapine to take at bedtime. Each tablet is 25 mg; start with 1 tablet and, if you are not getting satisfactory relief over the course of 1 week, increase to 2 tablets at bedtime.     Keep your follow-up appointment for 9/12/2022 as scheduled. However, if you continue to have persistent or worsening symptoms, or concerns with the new medication, please schedule an appointment before that time so that we can review. She reports today that she never really understood depression until this period of time.   She is now afraid of her thoughts that she hates her life. Medication review: she felt the least distressed on Lexapro and 1/3 tablet trazodone at night. Plan is to discontinue all meds except for Lexapro and Trazodone 1/3 tablet QHS. Referral to Psychiatry for medication and counseling management      [address] [place] [address]:  Patient's past medical, surgical, social and/or family history reviewed, updated in chart, and are non-contributory (unless otherwise stated). Medications and allergies also reviewed and updated in chart. Review of Systems  Review of Systems   HENT: Negative for congestion, ear pain and sore throat. Respiratory: Negative for cough, shortness of breath and wheezing. Cardiovascular: Negative for chest pain, palpitations and leg swelling. Gastrointestinal: Negative for abdominal pain, blood in stool, constipation, diarrhea, nausea and vomiting. Genitourinary: Negative for dysuria, frequency, hematuria and urgency. Musculoskeletal: Negative for back pain, myalgias and neck pain. Skin: Negative for rash. Neurological: Positive for headaches. Negative for dizziness and weakness. Psychiatric/Behavioral: The patient is not nervous/anxious. Physical Exam:    VS:  /86   Pulse 57   Temp 97.8 °F (36.6 °C) (Infrared)   Resp 16   Ht 6' (1.829 m)   Wt 253 lb 6.4 oz (114.9 kg)   LMP 05/01/2017   SpO2 98%   BMI 34.37 kg/m²     LAST WEIGHT:  Wt Readings from Last 3 Encounters:   07/13/22 253 lb 6.4 oz (114.9 kg)   06/06/22 258 lb (117 kg)   05/02/22 263 lb (119.3 kg)       BMI Readings from Last 3 Encounters:   07/13/22 34.37 kg/m²   06/06/22 34.99 kg/m²   05/02/22 35.67 kg/m²       Physical Exam  Constitutional:       General: She is not in acute distress. Appearance: She is overweight. She is not ill-appearing, toxic-appearing or diaphoretic.       Comments: Patient appears depressed and appears to be frequently tearful throughout the exam   Neurological:      Mental Status: She is alert. Psychiatric:         Attention and Perception: Attention and perception normal.         Mood and Affect: Mood is anxious and depressed. Affect is blunt and tearful. Speech: Speech normal.         Behavior: Behavior normal. Behavior is cooperative. Thought Content: Thought content normal.         Labs:  Lab Results   Component Value Date/Time     06/01/2022 09:37 AM    K 4.2 06/01/2022 09:37 AM     06/01/2022 09:37 AM    CO2 26 06/01/2022 09:37 AM    BUN 12 06/01/2022 09:37 AM    CREATININE 1.1 06/01/2022 09:37 AM    PROT 7.1 06/01/2022 09:37 AM    LABALBU 4.3 06/01/2022 09:37 AM    CALCIUM 9.5 06/01/2022 09:37 AM    GFRAA >60 06/01/2022 09:37 AM    LABGLOM 52 06/01/2022 09:37 AM    GLUCOSE 132 06/01/2022 09:37 AM    AST 29 06/01/2022 09:37 AM    ALT 28 06/01/2022 09:37 AM    ALKPHOS 51 06/01/2022 09:37 AM    BILITOT 0.4 06/01/2022 09:37 AM    TSH 0.730 06/01/2022 09:37 AM    CHOL 208 06/01/2022 09:37 AM    TRIG 195 06/01/2022 09:37 AM    HDL 42 06/01/2022 09:37 AM    LDLCALC 127 06/01/2022 09:37 AM    LABA1C 6.3 06/06/2022 03:53 PM        Lab Results   Component Value Date    CHOL 208 (H) 06/01/2022    CHOL 198 06/28/2021    CHOL 198 11/19/2018     Lab Results   Component Value Date    TRIG 195 (H) 06/01/2022    TRIG 214 (H) 06/28/2021    TRIG 220 (H) 11/19/2018     Lab Results   Component Value Date    HDL 42 06/01/2022    HDL 39 06/28/2021    HDL 44 11/19/2018     Lab Results   Component Value Date    LDLCALC 127 (H) 06/01/2022    LDLCALC 116 (H) 06/28/2021    LDLCALC 110 (H) 11/19/2018       Lab Results   Component Value Date    LABA1C 6.3 06/06/2022    LABA1C 6.0 07/06/2021     Lab Results   Component Value Date    LDLCALC 127 (H) 06/01/2022    CREATININE 1.1 (H) 06/01/2022         Assessment / Plan:      Ginette Better was seen today for discuss medications.     Diagnoses and all orders for this visit:    Anxiety and depression: Anxiety and depression that has been refractory to multiple medication treatments. Patient is ready for comprehensive psychiatric referral.  -     escitalopram (LEXAPRO) 20 MG tablet; Take 1 1/2 tablets daily  -     Continue Trazodone as directed  -     Stop all other mood disorder meds (atomoxetine, quetiapine, bupropion, buspirone)  -     Jaelyn Ann MD, Psychiatry, [place]    Time spent in pre-visit planning, interview, exam, /education and coordination of care: 30 minutes        Follow Up:  Return for Keep scheduled appointment(s). or sooner if necessary. Call or go to ED immediately if symptoms worsen or persist.    Educational materials (info regarding serotonin syndrome) printed for patient's review and were included in patient instructions on his/her AfterVisit Summary and given to patient at the end of visit. Counseled regarding above diagnosis,including possible risks and complications,  especially if left uncontrolled. Counseled regarding the possible side effects, risks, benefits and alternatives to treatment; patient and/or guardian verbalizes understanding, agrees, feels comfortable with and wishes to proceed with above treatment plan. Advised patient tocall with any new medication issues, and read all Rx info from pharmacy to assureaware of all possible risks and side effects of medication before taking. Reviewed age and gender appropriate health screening exams and vaccinations. Advisedpatient regarding importance of keeping up with recommended health maintenance andto schedule as soon as possible if overdue, as this is important in assessing forundiagnosed pathology, especially cancer, as well as protecting against potentially harmful/life threatening disease. Patient and/or guardian verbalizes understandingand agrees with above counseling, assessment and plan. All questions answered.     Gilma Ennis MD

## 2022-07-19 ENCOUNTER — OFFICE VISIT (OUTPATIENT)
Dept: FAMILY MEDICINE CLINIC | Age: 52
End: 2022-07-19
Payer: COMMERCIAL

## 2022-07-19 VITALS
SYSTOLIC BLOOD PRESSURE: 132 MMHG | HEART RATE: 60 BPM | OXYGEN SATURATION: 97 % | DIASTOLIC BLOOD PRESSURE: 89 MMHG | TEMPERATURE: 97.3 F

## 2022-07-19 DIAGNOSIS — J02.9 SORE THROAT: ICD-10-CM

## 2022-07-19 DIAGNOSIS — R06.02 SHORTNESS OF BREATH: ICD-10-CM

## 2022-07-19 DIAGNOSIS — R05.9 COUGH: ICD-10-CM

## 2022-07-19 DIAGNOSIS — R50.9 FEVER, UNSPECIFIED FEVER CAUSE: Primary | ICD-10-CM

## 2022-07-19 DIAGNOSIS — R52 BODY ACHES: ICD-10-CM

## 2022-07-19 DIAGNOSIS — R50.9 FEVER, UNSPECIFIED FEVER CAUSE: ICD-10-CM

## 2022-07-19 LAB
Lab: NORMAL
PERFORMING INSTRUMENT: NORMAL
QC PASS/FAIL: NORMAL
SARS-COV-2, POC: NORMAL

## 2022-07-19 PROCEDURE — 87426 SARSCOV CORONAVIRUS AG IA: CPT | Performed by: FAMILY MEDICINE

## 2022-07-19 PROCEDURE — 99213 OFFICE O/P EST LOW 20 MIN: CPT | Performed by: FAMILY MEDICINE

## 2022-07-19 ASSESSMENT — ENCOUNTER SYMPTOMS
COUGH: 0
NAUSEA: 0
WHEEZING: 0
ABDOMINAL PAIN: 0
CONSTIPATION: 0
SHORTNESS OF BREATH: 0
SORE THROAT: 0
BLOOD IN STOOL: 0
DIARRHEA: 0
BACK PAIN: 0
VOMITING: 0

## 2022-07-19 NOTE — PROGRESS NOTES
Barb Vega is a 47 y.o. female who presents today for     Chief Complaint   Patient presents with    Cough     Cough, fever     Fever       Onset of symptoms x2 days. Symptoms include fever, cough, shortness of breath, sore throat, body aches. She has been taking over-the-counter generic NyQuil and acetaminophen on a as needed basis. When initially asked about sick contacts, she denied any. However, on further questioning, she reports that she had been around her daughter all weekend who, upon returning home from a visit, tested positive for COVID. [address]:  Patient's past medical, surgical, social and/or family history reviewed, updated in chart, and are non-contributory (unless otherwise stated). Medications and allergies also reviewed and updated in chart. Review of Systems  Review of Systems   HENT:  Negative for congestion, ear pain and sore throat. Respiratory:  Negative for cough, shortness of breath and wheezing. Cardiovascular:  Negative for chest pain, palpitations and leg swelling. Gastrointestinal:  Negative for abdominal pain, blood in stool, constipation, diarrhea, nausea and vomiting. Genitourinary:  Negative for dysuria, frequency, hematuria and urgency. Musculoskeletal:  Negative for back pain, myalgias and neck pain. Skin:  Negative for rash. Neurological:  Positive for headaches. Negative for dizziness and weakness. Psychiatric/Behavioral:  The patient is not nervous/anxious.       Physical Exam:    VS:  /89 (Site: Left Upper Arm, Position: Sitting, Cuff Size: Large Adult)   Pulse 60   Temp 97.3 °F (36.3 °C) (Temporal)   LMP 05/01/2017   SpO2 97%     LAST WEIGHT:  Wt Readings from Last 3 Encounters:   07/13/22 253 lb 6.4 oz (114.9 kg)   06/06/22 258 lb (117 kg)   05/02/22 263 lb (119.3 kg)       BMI Readings from Last 3 Encounters:   07/13/22 34.37 kg/m²   06/06/22 34.99 kg/m²   05/02/22 35.67 kg/m²       Physical Exam  Constitutional: General: She is in acute distress. Appearance: Normal appearance. She is well-developed. She is ill-appearing. She is not diaphoretic. Comments: Patient appears acutely ill   HENT:      Head: Normocephalic and atraumatic. Right Ear: External ear normal.      Left Ear: External ear normal.      Mouth/Throat:      Pharynx: No oropharyngeal exudate. Eyes:      General: No scleral icterus. Right eye: No discharge. Conjunctiva/sclera: Conjunctivae normal.      Pupils: Pupils are equal, round, and reactive to light. Neck:      Thyroid: No thyromegaly. Cardiovascular:      Rate and Rhythm: Normal rate and regular rhythm. Heart sounds: Normal heart sounds. No murmur heard. Pulmonary:      Effort: Pulmonary effort is normal. No respiratory distress. Breath sounds: No stridor. No wheezing or rales. Chest:      Chest wall: No tenderness. Abdominal:      General: Bowel sounds are normal. There is no distension. Palpations: Abdomen is soft. There is no mass. Tenderness: There is no abdominal tenderness. There is no guarding. Musculoskeletal:         General: No tenderness. Normal range of motion. Cervical back: Normal range of motion and neck supple. Lymphadenopathy:      Cervical: No cervical adenopathy. Skin:     General: Skin is warm and dry. Coloration: Skin is not pale. Findings: No erythema or rash. Neurological:      Mental Status: She is alert and oriented to person, place, and time. Psychiatric:         Attention and Perception: Attention normal.         Mood and Affect: Mood and affect normal.         Speech: Speech normal.         Behavior: Behavior normal.         Thought Content: Thought content normal.         Cognition and Memory: Cognition normal.     Rapid COVID testing: Negative    Additional swab for send out COVID PCR obtained. Assessment / Plan:      Hue Swanson was seen today for cough and fever.     Diagnoses and all orders for this visit:    Fever, unspecified fever cause: Signs and symptoms, as well as confirmed history of exposure, strongly suggests COVID infection  -     POCT COVID-19, Antigen  -     Covid-19 Ambulatory; Future  -     nirmatrelvir/ritonavir (PAXLOVID) 20 x 150 MG & 10 x 100MG; Take 3 tablets (two 150 mg nirmatrelvir and one 100 mg ritonavir tablets) by mouth every 12 hours for 5 days. Body aches: Signs and symptoms, as well as confirmed history of exposure, strongly suggests COVID infection  -     POCT COVID-19, Antigen  -     Covid-19 Ambulatory; Future  -     nirmatrelvir/ritonavir (PAXLOVID) 20 x 150 MG & 10 x 100MG; Take 3 tablets (two 150 mg nirmatrelvir and one 100 mg ritonavir tablets) by mouth every 12 hours for 5 days. Sore throat: Signs and symptoms, as well as confirmed history of exposure, strongly suggests COVID infection  -     POCT COVID-19, Antigen  -     Covid-19 Ambulatory; Future  -     nirmatrelvir/ritonavir (PAXLOVID) 20 x 150 MG & 10 x 100MG; Take 3 tablets (two 150 mg nirmatrelvir and one 100 mg ritonavir tablets) by mouth every 12 hours for 5 days. Cough: Signs and symptoms, as well as confirmed history of exposure, strongly suggests COVID infection  -     POCT COVID-19, Antigen  -     Covid-19 Ambulatory; Future  -     nirmatrelvir/ritonavir (PAXLOVID) 20 x 150 MG & 10 x 100MG; Take 3 tablets (two 150 mg nirmatrelvir and one 100 mg ritonavir tablets) by mouth every 12 hours for 5 days. Shortness of breath: Signs and symptoms, as well as confirmed history of exposure, strongly suggests COVID infection  -     POCT COVID-19, Antigen  -     Covid-19 Ambulatory; Future  -     nirmatrelvir/ritonavir (PAXLOVID) 20 x 150 MG & 10 x 100MG; Take 3 tablets (two 150 mg nirmatrelvir and one 100 mg ritonavir tablets) by mouth every 12 hours for 5 days. Follow Up:  Return if symptoms worsen or fail to improve. or sooner if necessary.       Call or go to ED immediately if symptoms worsen

## 2022-07-20 LAB — SARS-COV-2, PCR: NOT DETECTED

## 2022-09-29 DIAGNOSIS — R73.03 PREDIABETES: ICD-10-CM

## 2022-09-29 LAB
ALBUMIN SERPL-MCNC: 4.2 G/DL (ref 3.5–5.2)
ALP BLD-CCNC: 51 U/L (ref 35–104)
ALT SERPL-CCNC: 20 U/L (ref 0–32)
ANION GAP SERPL CALCULATED.3IONS-SCNC: 12 MMOL/L (ref 7–16)
AST SERPL-CCNC: 24 U/L (ref 0–31)
BILIRUB SERPL-MCNC: 0.3 MG/DL (ref 0–1.2)
BUN BLDV-MCNC: 17 MG/DL (ref 6–20)
CALCIUM SERPL-MCNC: 9.6 MG/DL (ref 8.6–10.2)
CHLORIDE BLD-SCNC: 102 MMOL/L (ref 98–107)
CHOLESTEROL, TOTAL: 223 MG/DL (ref 0–199)
CO2: 26 MMOL/L (ref 22–29)
CREAT SERPL-MCNC: 1.1 MG/DL (ref 0.5–1)
GFR AFRICAN AMERICAN: >60
GFR NON-AFRICAN AMERICAN: 52 ML/MIN/1.73
GLUCOSE BLD-MCNC: 130 MG/DL (ref 74–99)
HBA1C MFR BLD: 6.6 % (ref 4–5.6)
HDLC SERPL-MCNC: 41 MG/DL
LDL CHOLESTEROL CALCULATED: 135 MG/DL (ref 0–99)
POTASSIUM SERPL-SCNC: 4.9 MMOL/L (ref 3.5–5)
SODIUM BLD-SCNC: 140 MMOL/L (ref 132–146)
TOTAL PROTEIN: 7.5 G/DL (ref 6.4–8.3)
TRIGL SERPL-MCNC: 236 MG/DL (ref 0–149)
VLDLC SERPL CALC-MCNC: 47 MG/DL

## 2022-12-19 ENCOUNTER — TELEPHONE (OUTPATIENT)
Dept: FAMILY MEDICINE CLINIC | Age: 52
End: 2022-12-19

## 2022-12-19 NOTE — TELEPHONE ENCOUNTER
LMOM for patient regarding medication refill request for gabapentin, 100mg, does have enough medication to get through to 1/17/2023 appointment and clarification if is getting through [address] or the [facility] mail pharmacy?

## 2023-01-17 ENCOUNTER — OFFICE VISIT (OUTPATIENT)
Dept: FAMILY MEDICINE CLINIC | Age: 53
End: 2023-01-17
Payer: COMMERCIAL

## 2023-01-17 VITALS
WEIGHT: 269 LBS | SYSTOLIC BLOOD PRESSURE: 126 MMHG | DIASTOLIC BLOOD PRESSURE: 78 MMHG | HEIGHT: 72 IN | OXYGEN SATURATION: 96 % | RESPIRATION RATE: 18 BRPM | TEMPERATURE: 98 F | HEART RATE: 56 BPM | BODY MASS INDEX: 36.44 KG/M2

## 2023-01-17 DIAGNOSIS — R20.2 PARESTHESIA: ICD-10-CM

## 2023-01-17 DIAGNOSIS — F41.9 ANXIETY AND DEPRESSION: ICD-10-CM

## 2023-01-17 DIAGNOSIS — F32.A ANXIETY AND DEPRESSION: ICD-10-CM

## 2023-01-17 DIAGNOSIS — E11.9 TYPE 2 DIABETES MELLITUS WITHOUT COMPLICATION, WITHOUT LONG-TERM CURRENT USE OF INSULIN (HCC): Primary | ICD-10-CM

## 2023-01-17 DIAGNOSIS — Z76.0 ENCOUNTER FOR MEDICATION REFILL: ICD-10-CM

## 2023-01-17 DIAGNOSIS — Z23 NEED FOR INFLUENZA VACCINATION: ICD-10-CM

## 2023-01-17 DIAGNOSIS — Z12.31 ENCOUNTER FOR SCREENING MAMMOGRAM FOR MALIGNANT NEOPLASM OF BREAST: ICD-10-CM

## 2023-01-17 LAB — HBA1C MFR BLD: 6.8 %

## 2023-01-17 PROCEDURE — 3074F SYST BP LT 130 MM HG: CPT | Performed by: FAMILY MEDICINE

## 2023-01-17 PROCEDURE — 3044F HG A1C LEVEL LT 7.0%: CPT | Performed by: FAMILY MEDICINE

## 2023-01-17 PROCEDURE — 83036 HEMOGLOBIN GLYCOSYLATED A1C: CPT | Performed by: FAMILY MEDICINE

## 2023-01-17 PROCEDURE — 3078F DIAST BP <80 MM HG: CPT | Performed by: FAMILY MEDICINE

## 2023-01-17 PROCEDURE — 90674 CCIIV4 VAC NO PRSV 0.5 ML IM: CPT | Performed by: FAMILY MEDICINE

## 2023-01-17 PROCEDURE — 90471 IMMUNIZATION ADMIN: CPT | Performed by: FAMILY MEDICINE

## 2023-01-17 PROCEDURE — 99214 OFFICE O/P EST MOD 30 MIN: CPT | Performed by: FAMILY MEDICINE

## 2023-01-17 RX ORDER — METFORMIN HYDROCHLORIDE 500 MG/1
500 TABLET, EXTENDED RELEASE ORAL
Qty: 90 TABLET | Refills: 1 | Status: SHIPPED | OUTPATIENT
Start: 2023-01-17

## 2023-01-17 RX ORDER — GABAPENTIN 100 MG/1
200 CAPSULE ORAL NIGHTLY
Qty: 180 CAPSULE | Refills: 3 | Status: SHIPPED | OUTPATIENT
Start: 2023-01-17 | End: 2024-01-17

## 2023-01-17 ASSESSMENT — ENCOUNTER SYMPTOMS
BACK PAIN: 0
WHEEZING: 0
CONSTIPATION: 0
ABDOMINAL PAIN: 0
SORE THROAT: 0
SHORTNESS OF BREATH: 0
VOMITING: 0
COUGH: 0
BLOOD IN STOOL: 0
NAUSEA: 0
DIARRHEA: 0

## 2023-01-17 ASSESSMENT — PATIENT HEALTH QUESTIONNAIRE - PHQ9
SUM OF ALL RESPONSES TO PHQ QUESTIONS 1-9: 0
1. LITTLE INTEREST OR PLEASURE IN DOING THINGS: 0
5. POOR APPETITE OR OVEREATING: 0
SUM OF ALL RESPONSES TO PHQ QUESTIONS 1-9: 0
SUM OF ALL RESPONSES TO PHQ9 QUESTIONS 1 & 2: 0
2. FEELING DOWN, DEPRESSED OR HOPELESS: 0
7. TROUBLE CONCENTRATING ON THINGS, SUCH AS READING THE NEWSPAPER OR WATCHING TELEVISION: 0
9. THOUGHTS THAT YOU WOULD BE BETTER OFF DEAD, OR OF HURTING YOURSELF: 0
SUM OF ALL RESPONSES TO PHQ9 QUESTIONS 1 & 2: 0
6. FEELING BAD ABOUT YOURSELF - OR THAT YOU ARE A FAILURE OR HAVE LET YOURSELF OR YOUR FAMILY DOWN: 0
10. IF YOU CHECKED OFF ANY PROBLEMS, HOW DIFFICULT HAVE THESE PROBLEMS MADE IT FOR YOU TO DO YOUR WORK, TAKE CARE OF THINGS AT HOME, OR GET ALONG WITH OTHER PEOPLE: 0
3. TROUBLE FALLING OR STAYING ASLEEP: 0
SUM OF ALL RESPONSES TO PHQ QUESTIONS 1-9: 0
SUM OF ALL RESPONSES TO PHQ QUESTIONS 1-9: 0
1. LITTLE INTEREST OR PLEASURE IN DOING THINGS: 0
SUM OF ALL RESPONSES TO PHQ QUESTIONS 1-9: 0
SUM OF ALL RESPONSES TO PHQ QUESTIONS 1-9: 0
4. FEELING TIRED OR HAVING LITTLE ENERGY: 0
SUM OF ALL RESPONSES TO PHQ QUESTIONS 1-9: 0
SUM OF ALL RESPONSES TO PHQ QUESTIONS 1-9: 0
2. FEELING DOWN, DEPRESSED OR HOPELESS: 0
8. MOVING OR SPEAKING SO SLOWLY THAT OTHER PEOPLE COULD HAVE NOTICED. OR THE OPPOSITE, BEING SO FIGETY OR RESTLESS THAT YOU HAVE BEEN MOVING AROUND A LOT MORE THAN USUAL: 0

## 2023-01-17 ASSESSMENT — LIFESTYLE VARIABLES
HOW MANY STANDARD DRINKS CONTAINING ALCOHOL DO YOU HAVE ON A TYPICAL DAY: 1 OR 2
HOW OFTEN DO YOU HAVE A DRINK CONTAINING ALCOHOL: 2-3 TIMES A WEEK

## 2023-01-17 NOTE — PROGRESS NOTES
Amber Sigala is a 47 y.o. female who presents today for     Chief Complaint   Patient presents with    Follow-up     Anxiety and/or depression: This is a/an chronic problem. This has been going on for greater than 1 year. Exacerbating factors include concerns about the well being of her family (especially her college aged son), headache symptoms, and recent pandemic. Alleviating factors include compliance with medication as prescribed. Treatment in the past includes escitalopram, bupropion. Anxiety symptoms include palpitations, anxious mood. Depressive symptoms include depressed mood, anhedonia, fatigue. Patient does not have suicidal or homicidal ideation. Patient is not having issues falling or staying asleep. Patient is not having associated panic attacks. The above is not interfering with quality of life. Patient has seen a Counselor before. Patient does not use alcohol and/or drugs. Family history includes N/A. Today (1/1723): PHQ-9=0. Meds include Lexapro and Trazodone 1/3 tablet QHS. Referral to Psychiatry for medication and counseling management. She attended briefly but when she learned that therpay was group therapy alone, she did not pursue care further. Non insulin Requiring Diabetes:  Up until now, no medication had been prescribed.     Lab Results   Component Value Date    LABA1C 6.8 01/17/2023    LABA1C 6.6 (H) 09/29/2022    LABA1C 6.3 06/06/2022     Lab Results   Component Value Date    LDLCALC 135 (H) 09/29/2022    CREATININE 1.1 (H) 09/29/2022      Lab Results   Component Value Date    CHOL 223 (H) 09/29/2022    CHOL 208 (H) 06/01/2022    CHOL 198 06/28/2021     Lab Results   Component Value Date    TRIG 236 (H) 09/29/2022    TRIG 195 (H) 06/01/2022    TRIG 214 (H) 06/28/2021     Lab Results   Component Value Date    HDL 41 09/29/2022    HDL 42 06/01/2022    HDL 39 06/28/2021     Lab Results   Component Value Date    LDLCALC 135 (H) 09/29/2022    [facility] 127 (H) 06/01/2022    LDLCALC 116 (H) 06/28/2021     Discussed management. At this point she needs medication management as well as weight loss. Options include Metformin for DM management; weight loss includes conservative self directed versus referral to medical or surgical bariatrics. Shared decision making: trial Metformin  mg QAM, \"brain based\" approach to losing weight (resources provided) and follow up in 3 months. Attention Deficit Disorder:  Longstanding; not treated since she was a kid. She reports that she did not feel good on Strattera. She reports that she had been on a stimulant in the past that seemed to be effective. She will need care of Psychiatry for such prescription; she tabled it for now. Neuropathy:  Stable. Refill of Neurontin. [address]:  Patient's past medical, surgical, social and/or family history reviewed, updated in chart, and are non-contributory (unless otherwise stated). Medications and allergies also reviewed and updated in chart. Review of Systems  Review of Systems   HENT:  Negative for congestion, ear pain and sore throat. Respiratory:  Negative for cough, shortness of breath and wheezing. Cardiovascular:  Negative for chest pain, palpitations and leg swelling. Gastrointestinal:  Negative for abdominal pain, blood in stool, constipation, diarrhea, nausea and vomiting. Genitourinary:  Negative for dysuria, frequency, hematuria and urgency. Musculoskeletal:  Negative for back pain, myalgias and neck pain. Skin:  Negative for rash. Neurological:  Positive for headaches. Negative for dizziness and weakness. Psychiatric/Behavioral:  The patient is not nervous/anxious.       Physical Exam:    VS:  /78   Pulse 56   Temp 98 °F (36.7 °C) (Infrared)   Resp 18   Ht 6' (1.829 m)   Wt 269 lb (122 kg)   LMP 05/01/2017   SpO2 96%   BMI 36.48 kg/m²     LAST WEIGHT:  Wt Readings from Last 3 Encounters:   01/17/23 269 lb (122 kg)   07/13/22 253 lb 6.4 oz (114.9 kg)   06/06/22 258 lb (117 kg)       BMI Readings from Last 3 Encounters:   01/17/23 36.48 kg/m²   07/13/22 34.37 kg/m²   06/06/22 34.99 kg/m²       Physical Exam  Constitutional:       General: She is not in acute distress. Appearance: She is overweight. She is not ill-appearing, toxic-appearing or diaphoretic. Comments: Patient appears depressed and appears to be frequently tearful throughout the exam   Neurological:      Mental Status: She is alert. Psychiatric:         Attention and Perception: Attention and perception normal.         Mood and Affect: Mood is anxious and depressed. Affect is blunt and tearful. Speech: Speech normal.         Behavior: Behavior normal. Behavior is cooperative. Thought Content:  Thought content normal.       Labs:  Lab Results   Component Value Date/Time     09/29/2022 09:26 AM    K 4.9 09/29/2022 09:26 AM     09/29/2022 09:26 AM    CO2 26 09/29/2022 09:26 AM    BUN 17 09/29/2022 09:26 AM    CREATININE 1.1 09/29/2022 09:26 AM    PROT 7.5 09/29/2022 09:26 AM    LABALBU 4.2 09/29/2022 09:26 AM    CALCIUM 9.6 09/29/2022 09:26 AM    GFRAA >60 09/29/2022 09:26 AM    LABGLOM 52 09/29/2022 09:26 AM    GLUCOSE 130 09/29/2022 09:26 AM    AST 24 09/29/2022 09:26 AM    ALT 20 09/29/2022 09:26 AM    ALKPHOS 51 09/29/2022 09:26 AM    BILITOT 0.3 09/29/2022 09:26 AM    TSH 0.730 06/01/2022 09:37 AM    CHOL 223 09/29/2022 09:26 AM    TRIG 236 09/29/2022 09:26 AM    HDL 41 09/29/2022 09:26 AM    LDLCALC 135 09/29/2022 09:26 AM    LABA1C 6.8 01/17/2023 08:03 AM        Lab Results   Component Value Date    CHOL 223 (H) 09/29/2022    CHOL 208 (H) 06/01/2022    CHOL 198 06/28/2021     Lab Results   Component Value Date    TRIG 236 (H) 09/29/2022    TRIG 195 (H) 06/01/2022    TRIG 214 (H) 06/28/2021     Lab Results   Component Value Date    HDL 41 09/29/2022    HDL 42 06/01/2022    HDL 39 06/28/2021     Lab Results   Component Value Date    LDLCALC 135 (H) 09/29/2022    LDLCALC 127 (H) 06/01/2022    LDLCALC 116 (H) 06/28/2021       Lab Results   Component Value Date    LABA1C 6.8 01/17/2023    LABA1C 6.6 (H) 09/29/2022    LABA1C 6.3 06/06/2022     Lab Results   Component Value Date    LDLCALC 135 (H) 09/29/2022    CREATININE 1.1 (H) 09/29/2022         Assessment / Plan:      Swati Stout was seen today for follow-up. Diagnoses and all orders for this visit:    Type 2 diabetes mellitus without complication, without long-term current use of insulin ([facility] [address] progression from prediabetes to diabetes. Medication is now indicated. Future labs also ordered. -     POCT glycosylated hemoglobin (Hb A1C)  -     metFORMIN (GLUCOPHAGE-XR) 500 MG extended release tablet; Take 1 tablet by mouth daily (with breakfast)  -     Written and verbal information, including a specific resource on weight management, provided to the patient.  -     CBC with Auto Differential; Future  -     Comprehensive Metabolic Panel; Future  -     Hemoglobin A1C; Future  -     Lipid Panel; Future  -     Microalbumin / Creatinine Urine Ratio; Future  -     Vitamin B12 & Folate; Future    Anxiety and depression: Patient reports that signs and symptoms are currently stable and satisfactorily controlled. Inquired about stimulant for ADD  -     escitalopram (LEXAPRO) 20 MG tablet; Take 1 1/2 tablets daily  -     Continue Trazodone as directed  -     Patient was advised that if stimulant therapy for attention deficit symptoms is indicated, she will need to schedule with psychiatry. Paresthesia: Stable. Due for medication refill (gabapentin)  -     gabapentin (NEURONTIN) 100 MG capsule; Take 2 capsules by mouth nightly. Intended supply: 90 days  -     Vitamin B12 & Folate; Future    Encounter for medication refill  -     gabapentin (NEURONTIN) 100 MG capsule; Take 2 capsules by mouth nightly. Intended supply: 90 days  -     metFORMIN (GLUCOPHAGE-XR) 500 MG extended release tablet;  Take 1 tablet by mouth daily (with breakfast)  -     CBC with Auto Differential; Future  -     Comprehensive Metabolic Panel; Future  -     Hemoglobin A1C; Future  -     Lipid Panel; Future  -     Microalbumin / Creatinine Urine Ratio; Future  -     Vitamin B12 & Folate; Future    Encounter for screening mammogram for malignant neoplasm of breast: Due for mammogram  -     [facility] DIGITAL SCREEN BILATERAL; Future    Need for influenza vaccination: Due for flu shot  -     Influenza, FLUCELVAX, (age 10 mo+), IM, PF, 0.5 mL      Follow Up:  Return in about 3 months (around 4/17/2023) for Diabetes Check Up (30 minute appointment), Fasting lab work 1 week prior. or sooner if necessary. Call or go to ED immediately if symptoms worsen or persist.    Educational materials (info regarding serotonin syndrome) printed for patient's review and were included in patient instructions on his/her AfterVisit Summary and given to patient at the end of visit. Counseled regarding above diagnosis,including possible risks and complications,  especially if left uncontrolled. Counseled regarding the possible side effects, risks, benefits and alternatives to treatment; patient and/or guardian verbalizes understanding, agrees, feels comfortable with and wishes to proceed with above treatment plan. Advised patient tocall with any new medication issues, and read all Rx info from pharmacy to assureaware of all possible risks and side effects of medication before taking. Reviewed age and gender appropriate health screening exams and vaccinations. Advisedpatient regarding importance of keeping up with recommended health maintenance andto schedule as soon as possible if overdue, as this is important in assessing forundiagnosed pathology, especially cancer, as well as protecting against potentially harmful/life threatening disease. Patient and/or guardian verbalizes understandingand agrees with above counseling, assessment and plan.     All questions answered.     Manuela Gamble MD

## 2023-01-17 NOTE — PATIENT INSTRUCTIONS
Tejinder Churchill MD:    How To Lose Weight For the Last Time: Brain-Based Solution to Permanent Weight Loss

## 2023-02-10 LAB — DIABETIC RETINOPATHY: NEGATIVE

## 2023-04-21 ENCOUNTER — OFFICE VISIT (OUTPATIENT)
Dept: FAMILY MEDICINE CLINIC | Age: 53
End: 2023-04-21

## 2023-04-21 VITALS
OXYGEN SATURATION: 97 % | BODY MASS INDEX: 33.72 KG/M2 | DIASTOLIC BLOOD PRESSURE: 82 MMHG | WEIGHT: 249 LBS | SYSTOLIC BLOOD PRESSURE: 122 MMHG | HEIGHT: 72 IN | HEART RATE: 55 BPM | RESPIRATION RATE: 18 BRPM | TEMPERATURE: 97.5 F

## 2023-04-21 DIAGNOSIS — F41.9 ANXIETY AND DEPRESSION: ICD-10-CM

## 2023-04-21 DIAGNOSIS — F98.8 ADULT ATTENTION DEFICIT DISORDER: ICD-10-CM

## 2023-04-21 DIAGNOSIS — E55.9 VITAMIN D INSUFFICIENCY: ICD-10-CM

## 2023-04-21 DIAGNOSIS — I10 ESSENTIAL HYPERTENSION: ICD-10-CM

## 2023-04-21 DIAGNOSIS — K59.00 CONSTIPATION, UNSPECIFIED CONSTIPATION TYPE: ICD-10-CM

## 2023-04-21 DIAGNOSIS — E11.9 TYPE 2 DIABETES MELLITUS WITHOUT COMPLICATION, WITHOUT LONG-TERM CURRENT USE OF INSULIN (HCC): ICD-10-CM

## 2023-04-21 DIAGNOSIS — Z76.0 ENCOUNTER FOR MEDICATION REFILL: Primary | ICD-10-CM

## 2023-04-21 DIAGNOSIS — F32.A ANXIETY AND DEPRESSION: ICD-10-CM

## 2023-04-21 RX ORDER — ALBUTEROL SULFATE 90 UG/1
2 AEROSOL, METERED RESPIRATORY (INHALATION)
Qty: 1 EACH | Refills: 11 | Status: CANCELLED | OUTPATIENT
Start: 2023-04-21 | End: 2024-04-21

## 2023-04-21 RX ORDER — TRAZODONE HYDROCHLORIDE 150 MG/1
150 TABLET ORAL NIGHTLY
Qty: 90 TABLET | Refills: 3 | Status: SHIPPED | OUTPATIENT
Start: 2023-04-21 | End: 2024-04-21

## 2023-04-21 RX ORDER — FLUTICASONE PROPIONATE 50 MCG
2 SPRAY, SUSPENSION (ML) NASAL DAILY
Qty: 3 EACH | Refills: 3 | Status: CANCELLED | OUTPATIENT
Start: 2023-04-21 | End: 2024-04-20

## 2023-04-21 RX ORDER — POLYETHYLENE GLYCOL 3350 17 G/17G
17 POWDER, FOR SOLUTION ORAL DAILY
Qty: 1530 G | Refills: 1 | COMMUNITY
Start: 2023-04-21 | End: 2023-05-21

## 2023-04-21 RX ORDER — DOCUSATE SODIUM 100 MG/1
100 CAPSULE, LIQUID FILLED ORAL 2 TIMES DAILY PRN
Qty: 60 CAPSULE | Refills: 0 | Status: SHIPPED | OUTPATIENT
Start: 2023-04-21

## 2023-04-21 RX ORDER — METOPROLOL SUCCINATE 50 MG/1
25 TABLET, EXTENDED RELEASE ORAL DAILY
Qty: 90 TABLET | Refills: 3 | Status: SHIPPED | OUTPATIENT
Start: 2023-04-21 | End: 2024-04-21

## 2023-04-21 RX ORDER — TRIAMTERENE AND HYDROCHLOROTHIAZIDE 37.5; 25 MG/1; MG/1
1 TABLET ORAL DAILY
Qty: 90 TABLET | Refills: 3 | Status: SHIPPED | OUTPATIENT
Start: 2023-04-21 | End: 2024-04-21

## 2023-04-21 SDOH — ECONOMIC STABILITY: FOOD INSECURITY: WITHIN THE PAST 12 MONTHS, YOU WORRIED THAT YOUR FOOD WOULD RUN OUT BEFORE YOU GOT MONEY TO BUY MORE.: NEVER TRUE

## 2023-04-21 SDOH — ECONOMIC STABILITY: HOUSING INSECURITY
IN THE LAST 12 MONTHS, WAS THERE A TIME WHEN YOU DID NOT HAVE A STEADY PLACE TO SLEEP OR SLEPT IN A SHELTER (INCLUDING NOW)?: NO

## 2023-04-21 SDOH — ECONOMIC STABILITY: INCOME INSECURITY: HOW HARD IS IT FOR YOU TO PAY FOR THE VERY BASICS LIKE FOOD, HOUSING, MEDICAL CARE, AND HEATING?: NOT HARD AT ALL

## 2023-04-21 SDOH — ECONOMIC STABILITY: FOOD INSECURITY: WITHIN THE PAST 12 MONTHS, THE FOOD YOU BOUGHT JUST DIDN'T LAST AND YOU DIDN'T HAVE MONEY TO GET MORE.: NEVER TRUE

## 2023-04-21 ASSESSMENT — ENCOUNTER SYMPTOMS
SORE THROAT: 0
BACK PAIN: 0
ABDOMINAL PAIN: 0
VOMITING: 0
NAUSEA: 0
COUGH: 0
WHEEZING: 0
CONSTIPATION: 1
BLOOD IN STOOL: 0
SHORTNESS OF BREATH: 0
DIARRHEA: 0

## 2023-04-21 NOTE — PATIENT INSTRUCTIONS
1)Take colace (pills) 2 times daily as needed to soften. 2) Take Miralax 17 g powder in 8 oz of water every day for 2 weeks. Ensure to follow with at least an additional 8 oz of water. Total water intake for the day should be at LEAST 64 oz.

## 2023-04-21 NOTE — PROGRESS NOTES
(MAXZIDE-25) 37.5-25 MG per tablet; Take 1 tablet by mouth daily No further refills without appointment  -     traZODone (DESYREL) 150 MG tablet; Take 1 tablet by mouth nightly  -     vitamin D (CHOLECALCIFEROL) 125 MCG (5000 UT) CAPS capsule; Take 1 capsule by mouth daily    Type 2 diabetes mellitus without complication, without long-term current use of insulin ([address]):  Labs reviewed and demonstrate both improvement in lipid panel and hemoglobin A1c. Patient notes compliance as well as tolerance with current metformin dose. Due for medication refills and labs for next visit. -     Continue metFORMIN (GLUCOPHAGE-XR) 500 MG extended release tablet; Take 1 tablet by mouth daily (with breakfast)  -     CBC with Auto Differential; Future  -     Comprehensive Metabolic Panel; Future  -     Hemoglobin A1C; Future  -     Lipid Panel; Future    Constipation, unspecified constipation type: Patient notes onset about 3 months ago. Patient has attempted very few over-the-counter remedies at home other than magnesium oxide with minimal effort. At this point, recommended both Colace and over-the-counter MiraLAX/GlycoLax with directions for close follow-up if symptoms do not improve. -     docusate sodium (COLACE) 100 MG capsule; Take 1 capsule by mouth 2 times daily as needed for Constipation  -     polyethylene glycol (GLYCOLAX) 17 GM/SCOOP powder; Take 17 g by mouth daily    Essential hypertension: Stable well-controlled at this time. Labs reviewed and listed above. Patient will continue on current adjuvant at this time as blood pressure is controlled and patient tolerates without adverse effect. Due for labs at next visit  -     Continue metoprolol succinate (TOPROL XL) 50 MG extended release tablet; Take 0.5 tablets by mouth daily  -     Continue triamterene-hydroCHLOROthiazide (MAXZIDE-25) 37.5-25 MG per tablet;  Take 1 tablet by mouth daily No further refills without appointment  -     CBC with Auto Differential;

## 2023-07-04 DIAGNOSIS — F41.9 ANXIETY AND DEPRESSION: ICD-10-CM

## 2023-07-04 DIAGNOSIS — F32.A ANXIETY AND DEPRESSION: ICD-10-CM

## 2023-07-04 DIAGNOSIS — I10 ESSENTIAL HYPERTENSION: ICD-10-CM

## 2023-07-04 DIAGNOSIS — Z76.0 ENCOUNTER FOR MEDICATION REFILL: ICD-10-CM

## 2023-07-06 RX ORDER — ESCITALOPRAM OXALATE 20 MG/1
TABLET ORAL
Qty: 135 TABLET | Refills: 3 | OUTPATIENT
Start: 2023-07-06

## 2023-07-06 RX ORDER — TRAZODONE HYDROCHLORIDE 150 MG/1
150 TABLET ORAL NIGHTLY
Qty: 90 TABLET | Refills: 3 | OUTPATIENT
Start: 2023-07-06 | End: 2024-07-06

## 2023-07-06 RX ORDER — TRIAMTERENE AND HYDROCHLOROTHIAZIDE 37.5; 25 MG/1; MG/1
TABLET ORAL
Qty: 90 TABLET | Refills: 2 | OUTPATIENT
Start: 2023-07-06

## 2023-07-06 RX ORDER — METOPROLOL SUCCINATE 50 MG/1
TABLET, EXTENDED RELEASE ORAL
Qty: 90 TABLET | Refills: 2 | OUTPATIENT
Start: 2023-07-06

## 2023-07-14 DIAGNOSIS — E11.9 TYPE 2 DIABETES MELLITUS WITHOUT COMPLICATION, WITHOUT LONG-TERM CURRENT USE OF INSULIN (HCC): ICD-10-CM

## 2023-07-14 DIAGNOSIS — Z76.0 ENCOUNTER FOR MEDICATION REFILL: ICD-10-CM

## 2023-07-16 RX ORDER — METFORMIN HYDROCHLORIDE 500 MG/1
TABLET, EXTENDED RELEASE ORAL
Qty: 90 TABLET | Refills: 1 | OUTPATIENT
Start: 2023-07-16

## 2023-07-18 DIAGNOSIS — I10 ESSENTIAL HYPERTENSION: ICD-10-CM

## 2023-07-18 DIAGNOSIS — E11.9 TYPE 2 DIABETES MELLITUS WITHOUT COMPLICATION, WITHOUT LONG-TERM CURRENT USE OF INSULIN (HCC): ICD-10-CM

## 2023-07-18 DIAGNOSIS — E55.9 VITAMIN D INSUFFICIENCY: ICD-10-CM

## 2023-07-18 LAB
ABSOLUTE EOS #: 0.25 K/UL (ref 0.05–0.5)
ABSOLUTE IMMATURE GRANULOCYTE: <0.03 K/UL (ref 0–0.58)
ABSOLUTE LYMPH #: 1.9 K/UL (ref 1.5–4)
ABSOLUTE MONO #: 0.37 K/UL (ref 0.1–0.95)
ALBUMIN SERPL-MCNC: 4.6 G/DL (ref 3.5–5.2)
ALP BLD-CCNC: 54 U/L (ref 35–104)
ALT SERPL-CCNC: 17 U/L (ref 0–32)
ANION GAP SERPL CALCULATED.3IONS-SCNC: 17 MMOL/L (ref 7–16)
AST SERPL-CCNC: 25 U/L (ref 0–31)
BASOPHILS # BLD: 1 % (ref 0–2)
BASOPHILS ABSOLUTE: 0.07 K/UL (ref 0–0.2)
BILIRUB SERPL-MCNC: 0.2 MG/DL (ref 0–1.2)
BUN BLDV-MCNC: 18 MG/DL (ref 6–20)
CALCIUM SERPL-MCNC: 9.6 MG/DL (ref 8.6–10.2)
CHLORIDE BLD-SCNC: 103 MMOL/L (ref 98–107)
CHOLESTEROL: 248 MG/DL
CO2: 22 MMOL/L (ref 22–29)
CREAT SERPL-MCNC: 1.1 MG/DL (ref 0.5–1)
EOSINOPHILS RELATIVE PERCENT: 5 % (ref 0–6)
GFR SERPL CREATININE-BSD FRML MDRD: >60 ML/MIN/1.73M2
GLUCOSE BLD-MCNC: 120 MG/DL (ref 74–99)
HBA1C MFR BLD: 5.8 % (ref 4–5.6)
HCT VFR BLD CALC: 44.7 % (ref 34–48)
HDLC SERPL-MCNC: 52 MG/DL
HEMOGLOBIN: 14.8 G/DL (ref 11.5–15.5)
IMMATURE GRANULOCYTES: 0 % (ref 0–5)
LDL CHOLESTEROL: 159 MG/DL
LYMPHOCYTES # BLD: 37 % (ref 20–42)
MCH RBC QN AUTO: 31.4 PG (ref 26–35)
MCHC RBC AUTO-ENTMCNC: 33.1 G/DL (ref 32–34.5)
MCV RBC AUTO: 94.7 FL (ref 80–99.9)
MONOCYTES # BLD: 7 % (ref 2–12)
PDW BLD-RTO: 12.5 % (ref 11.5–15)
PLATELET # BLD: 295 K/UL (ref 130–450)
PMV BLD AUTO: 11.1 FL (ref 7–12)
POTASSIUM SERPL-SCNC: 4.3 MMOL/L (ref 3.5–5)
RBC # BLD: 4.72 M/UL (ref 3.5–5.5)
SEG NEUTROPHILS: 50 % (ref 43–80)
SEGMENTED NEUTROPHILS ABSOLUTE COUNT: 2.58 K/UL (ref 1.8–7.3)
SODIUM BLD-SCNC: 142 MMOL/L (ref 132–146)
TOTAL PROTEIN: 7.5 G/DL (ref 6.4–8.3)
TRIGL SERPL-MCNC: 185 MG/DL
VLDLC SERPL CALC-MCNC: 37 MG/DL
WBC # BLD: 5.2 K/UL (ref 4.5–11.5)

## 2023-07-19 LAB — VITAMIN D 25-HYDROXY: 55.2 NG/ML (ref 30–100)

## 2023-07-21 ENCOUNTER — OFFICE VISIT (OUTPATIENT)
Dept: FAMILY MEDICINE CLINIC | Age: 53
End: 2023-07-21

## 2023-07-21 VITALS
RESPIRATION RATE: 16 BRPM | WEIGHT: 250 LBS | BODY MASS INDEX: 33.86 KG/M2 | OXYGEN SATURATION: 96 % | SYSTOLIC BLOOD PRESSURE: 102 MMHG | HEART RATE: 55 BPM | HEIGHT: 72 IN | TEMPERATURE: 97.2 F | DIASTOLIC BLOOD PRESSURE: 80 MMHG

## 2023-07-21 DIAGNOSIS — F41.9 ANXIETY AND DEPRESSION: ICD-10-CM

## 2023-07-21 DIAGNOSIS — E11.9 TYPE 2 DIABETES MELLITUS WITHOUT COMPLICATION, WITHOUT LONG-TERM CURRENT USE OF INSULIN (HCC): ICD-10-CM

## 2023-07-21 DIAGNOSIS — I10 ESSENTIAL HYPERTENSION: ICD-10-CM

## 2023-07-21 DIAGNOSIS — E83.42 HYPOMAGNESEMIA: ICD-10-CM

## 2023-07-21 DIAGNOSIS — G43.919 INTRACTABLE MIGRAINE WITHOUT STATUS MIGRAINOSUS, UNSPECIFIED MIGRAINE TYPE: ICD-10-CM

## 2023-07-21 DIAGNOSIS — R20.2 PARESTHESIA: ICD-10-CM

## 2023-07-21 DIAGNOSIS — J30.81 ALLERGIC RHINITIS DUE TO ANIMAL HAIR AND DANDER: ICD-10-CM

## 2023-07-21 DIAGNOSIS — E55.9 VITAMIN D INSUFFICIENCY: ICD-10-CM

## 2023-07-21 DIAGNOSIS — F32.A ANXIETY AND DEPRESSION: ICD-10-CM

## 2023-07-21 DIAGNOSIS — K59.00 CONSTIPATION, UNSPECIFIED CONSTIPATION TYPE: ICD-10-CM

## 2023-07-21 DIAGNOSIS — Z76.0 ENCOUNTER FOR MEDICATION REFILL: Primary | ICD-10-CM

## 2023-07-21 RX ORDER — METOPROLOL SUCCINATE 25 MG/1
25 TABLET, EXTENDED RELEASE ORAL DAILY
Qty: 90 TABLET | Refills: 3 | Status: SHIPPED | OUTPATIENT
Start: 2023-07-21 | End: 2024-07-21

## 2023-07-21 RX ORDER — RIZATRIPTAN BENZOATE 5 MG/1
5 TABLET, ORALLY DISINTEGRATING ORAL PRN
Qty: 30 TABLET | Refills: 2 | Status: SHIPPED | OUTPATIENT
Start: 2023-07-21 | End: 2024-07-21

## 2023-07-21 RX ORDER — DOCUSATE SODIUM 100 MG/1
100 CAPSULE, LIQUID FILLED ORAL 2 TIMES DAILY PRN
Qty: 180 CAPSULE | Refills: 3 | Status: CANCELLED | OUTPATIENT
Start: 2023-07-21 | End: 2024-07-21

## 2023-07-21 RX ORDER — TRIAMTERENE AND HYDROCHLOROTHIAZIDE 37.5; 25 MG/1; MG/1
1 TABLET ORAL DAILY
Qty: 90 TABLET | Refills: 3 | Status: SHIPPED | OUTPATIENT
Start: 2023-07-21 | End: 2024-07-21

## 2023-07-21 RX ORDER — METFORMIN HYDROCHLORIDE 500 MG/1
500 TABLET, EXTENDED RELEASE ORAL
Qty: 90 TABLET | Refills: 3 | Status: SHIPPED | OUTPATIENT
Start: 2023-07-21 | End: 2024-07-21

## 2023-07-21 RX ORDER — FLUTICASONE PROPIONATE 50 MCG
2 SPRAY, SUSPENSION (ML) NASAL DAILY
Qty: 3 EACH | Refills: 3 | Status: SHIPPED | OUTPATIENT
Start: 2023-07-21 | End: 2024-07-21

## 2023-07-21 RX ORDER — ESCITALOPRAM OXALATE 20 MG/1
TABLET ORAL
Qty: 135 TABLET | Refills: 3 | Status: SHIPPED | OUTPATIENT
Start: 2023-07-21 | End: 2024-07-21

## 2023-07-21 RX ORDER — GABAPENTIN 100 MG/1
200 CAPSULE ORAL NIGHTLY
Qty: 180 CAPSULE | Refills: 3 | Status: SHIPPED | OUTPATIENT
Start: 2023-07-21 | End: 2024-07-21

## 2023-07-21 RX ORDER — TRAZODONE HYDROCHLORIDE 150 MG/1
150 TABLET ORAL NIGHTLY
Qty: 90 TABLET | Refills: 3 | Status: SHIPPED | OUTPATIENT
Start: 2023-07-21 | End: 2024-07-21

## 2023-07-21 RX ORDER — ALBUTEROL SULFATE 90 UG/1
2 AEROSOL, METERED RESPIRATORY (INHALATION)
Qty: 1 EACH | Refills: 11 | Status: SHIPPED | OUTPATIENT
Start: 2023-07-21 | End: 2024-07-21

## 2023-07-21 ASSESSMENT — ENCOUNTER SYMPTOMS
VOMITING: 0
SHORTNESS OF BREATH: 0
WHEEZING: 0
CONSTIPATION: 1
DIARRHEA: 0
COUGH: 0
BACK PAIN: 0
BLOOD IN STOOL: 0
ABDOMINAL PAIN: 0
SORE THROAT: 0
NAUSEA: 0

## 2023-12-24 ENCOUNTER — APPOINTMENT (OUTPATIENT)
Dept: CARDIOLOGY | Facility: HOSPITAL | Age: 53
End: 2023-12-24
Payer: COMMERCIAL

## 2023-12-24 ENCOUNTER — HOSPITAL ENCOUNTER (EMERGENCY)
Facility: HOSPITAL | Age: 53
Discharge: HOME | End: 2023-12-25
Attending: STUDENT IN AN ORGANIZED HEALTH CARE EDUCATION/TRAINING PROGRAM
Payer: COMMERCIAL

## 2023-12-24 DIAGNOSIS — I48.0 PAROXYSMAL ATRIAL FIBRILLATION (MULTI): Primary | ICD-10-CM

## 2023-12-24 PROCEDURE — 83735 ASSAY OF MAGNESIUM: CPT | Performed by: STUDENT IN AN ORGANIZED HEALTH CARE EDUCATION/TRAINING PROGRAM

## 2023-12-24 PROCEDURE — 84100 ASSAY OF PHOSPHORUS: CPT | Performed by: STUDENT IN AN ORGANIZED HEALTH CARE EDUCATION/TRAINING PROGRAM

## 2023-12-24 PROCEDURE — 84702 CHORIONIC GONADOTROPIN TEST: CPT | Performed by: STUDENT IN AN ORGANIZED HEALTH CARE EDUCATION/TRAINING PROGRAM

## 2023-12-24 PROCEDURE — 80053 COMPREHEN METABOLIC PANEL: CPT | Performed by: STUDENT IN AN ORGANIZED HEALTH CARE EDUCATION/TRAINING PROGRAM

## 2023-12-24 PROCEDURE — 36415 COLL VENOUS BLD VENIPUNCTURE: CPT | Performed by: STUDENT IN AN ORGANIZED HEALTH CARE EDUCATION/TRAINING PROGRAM

## 2023-12-24 PROCEDURE — 84443 ASSAY THYROID STIM HORMONE: CPT | Performed by: STUDENT IN AN ORGANIZED HEALTH CARE EDUCATION/TRAINING PROGRAM

## 2023-12-24 PROCEDURE — 99284 EMERGENCY DEPT VISIT MOD MDM: CPT | Mod: 25 | Performed by: STUDENT IN AN ORGANIZED HEALTH CARE EDUCATION/TRAINING PROGRAM

## 2023-12-24 PROCEDURE — 83880 ASSAY OF NATRIURETIC PEPTIDE: CPT | Performed by: STUDENT IN AN ORGANIZED HEALTH CARE EDUCATION/TRAINING PROGRAM

## 2023-12-24 PROCEDURE — 96374 THER/PROPH/DIAG INJ IV PUSH: CPT

## 2023-12-24 PROCEDURE — 96361 HYDRATE IV INFUSION ADD-ON: CPT

## 2023-12-24 PROCEDURE — 84484 ASSAY OF TROPONIN QUANT: CPT | Performed by: STUDENT IN AN ORGANIZED HEALTH CARE EDUCATION/TRAINING PROGRAM

## 2023-12-24 PROCEDURE — 93005 ELECTROCARDIOGRAM TRACING: CPT

## 2023-12-24 RX ORDER — METOPROLOL TARTRATE 1 MG/ML
5 INJECTION, SOLUTION INTRAVENOUS ONCE
Status: COMPLETED | OUTPATIENT
Start: 2023-12-24 | End: 2023-12-25

## 2023-12-24 ASSESSMENT — COLUMBIA-SUICIDE SEVERITY RATING SCALE - C-SSRS
2. HAVE YOU ACTUALLY HAD ANY THOUGHTS OF KILLING YOURSELF?: NO
6. HAVE YOU EVER DONE ANYTHING, STARTED TO DO ANYTHING, OR PREPARED TO DO ANYTHING TO END YOUR LIFE?: NO
1. IN THE PAST MONTH, HAVE YOU WISHED YOU WERE DEAD OR WISHED YOU COULD GO TO SLEEP AND NOT WAKE UP?: NO

## 2023-12-24 ASSESSMENT — PAIN SCALES - GENERAL: PAINLEVEL_OUTOF10: 0 - NO PAIN

## 2023-12-24 ASSESSMENT — PAIN - FUNCTIONAL ASSESSMENT: PAIN_FUNCTIONAL_ASSESSMENT: 0-10

## 2023-12-25 ENCOUNTER — APPOINTMENT (OUTPATIENT)
Dept: RADIOLOGY | Facility: HOSPITAL | Age: 53
End: 2023-12-25
Payer: COMMERCIAL

## 2023-12-25 VITALS
HEART RATE: 62 BPM | TEMPERATURE: 97.7 F | RESPIRATION RATE: 16 BRPM | SYSTOLIC BLOOD PRESSURE: 134 MMHG | DIASTOLIC BLOOD PRESSURE: 85 MMHG | OXYGEN SATURATION: 97 % | BODY MASS INDEX: 33.86 KG/M2 | WEIGHT: 250 LBS | HEIGHT: 72 IN

## 2023-12-25 LAB
ALBUMIN SERPL BCP-MCNC: 4 G/DL (ref 3.4–5)
ALP SERPL-CCNC: 50 U/L (ref 33–110)
ALT SERPL W P-5'-P-CCNC: 18 U/L (ref 7–45)
ANION GAP SERPL CALC-SCNC: 12 MMOL/L (ref 10–20)
AST SERPL W P-5'-P-CCNC: 19 U/L (ref 9–39)
B-HCG SERPL-ACNC: 4 MIU/ML
BASOPHILS # BLD AUTO: 0.08 X10*3/UL (ref 0–0.1)
BASOPHILS NFR BLD AUTO: 0.9 %
BILIRUB SERPL-MCNC: 0.3 MG/DL (ref 0–1.2)
BNP SERPL-MCNC: 16 PG/ML (ref 0–99)
BUN SERPL-MCNC: 14 MG/DL (ref 6–23)
CALCIUM SERPL-MCNC: 9 MG/DL (ref 8.6–10.3)
CARDIAC TROPONIN I PNL SERPL HS: 7 NG/L (ref 0–13)
CHLORIDE SERPL-SCNC: 103 MMOL/L (ref 98–107)
CO2 SERPL-SCNC: 26 MMOL/L (ref 21–32)
CREAT SERPL-MCNC: 1.04 MG/DL (ref 0.5–1.05)
EOSINOPHIL # BLD AUTO: 0.18 X10*3/UL (ref 0–0.7)
EOSINOPHIL NFR BLD AUTO: 1.9 %
ERYTHROCYTE [DISTWIDTH] IN BLOOD BY AUTOMATED COUNT: 11.9 % (ref 11.5–14.5)
GFR SERPL CREATININE-BSD FRML MDRD: 65 ML/MIN/1.73M*2
GLUCOSE SERPL-MCNC: 120 MG/DL (ref 74–99)
HCT VFR BLD AUTO: 41.2 % (ref 36–46)
HGB BLD-MCNC: 14.7 G/DL (ref 12–16)
IMM GRANULOCYTES # BLD AUTO: 0.02 X10*3/UL (ref 0–0.7)
IMM GRANULOCYTES NFR BLD AUTO: 0.2 % (ref 0–0.9)
LYMPHOCYTES # BLD AUTO: 4.22 X10*3/UL (ref 1.2–4.8)
LYMPHOCYTES NFR BLD AUTO: 45.5 %
MAGNESIUM SERPL-MCNC: 1.78 MG/DL (ref 1.6–2.4)
MCH RBC QN AUTO: 31.3 PG (ref 26–34)
MCHC RBC AUTO-ENTMCNC: 35.7 G/DL (ref 32–36)
MCV RBC AUTO: 88 FL (ref 80–100)
MONOCYTES # BLD AUTO: 0.68 X10*3/UL (ref 0.1–1)
MONOCYTES NFR BLD AUTO: 7.3 %
NEUTROPHILS # BLD AUTO: 4.09 X10*3/UL (ref 1.2–7.7)
NEUTROPHILS NFR BLD AUTO: 44.2 %
NRBC BLD-RTO: 0 /100 WBCS (ref 0–0)
PHOSPHATE SERPL-MCNC: 3.4 MG/DL (ref 2.5–4.9)
PLATELET # BLD AUTO: 316 X10*3/UL (ref 150–450)
POTASSIUM SERPL-SCNC: 3.2 MMOL/L (ref 3.5–5.3)
PROT SERPL-MCNC: 6.8 G/DL (ref 6.4–8.2)
RBC # BLD AUTO: 4.7 X10*6/UL (ref 4–5.2)
SODIUM SERPL-SCNC: 138 MMOL/L (ref 136–145)
TSH SERPL-ACNC: 2.85 MIU/L (ref 0.44–3.98)
WBC # BLD AUTO: 9.3 X10*3/UL (ref 4.4–11.3)

## 2023-12-25 PROCEDURE — 2500000004 HC RX 250 GENERAL PHARMACY W/ HCPCS (ALT 636 FOR OP/ED): Performed by: STUDENT IN AN ORGANIZED HEALTH CARE EDUCATION/TRAINING PROGRAM

## 2023-12-25 PROCEDURE — 85025 COMPLETE CBC W/AUTO DIFF WBC: CPT | Performed by: STUDENT IN AN ORGANIZED HEALTH CARE EDUCATION/TRAINING PROGRAM

## 2023-12-25 PROCEDURE — 2500000005 HC RX 250 GENERAL PHARMACY W/O HCPCS: Performed by: STUDENT IN AN ORGANIZED HEALTH CARE EDUCATION/TRAINING PROGRAM

## 2023-12-25 PROCEDURE — 36415 COLL VENOUS BLD VENIPUNCTURE: CPT | Performed by: STUDENT IN AN ORGANIZED HEALTH CARE EDUCATION/TRAINING PROGRAM

## 2023-12-25 PROCEDURE — 71046 X-RAY EXAM CHEST 2 VIEWS: CPT | Performed by: RADIOLOGY

## 2023-12-25 PROCEDURE — 71046 X-RAY EXAM CHEST 2 VIEWS: CPT

## 2023-12-25 RX ORDER — POTASSIUM CHLORIDE 750 MG/1
40 TABLET, FILM COATED, EXTENDED RELEASE ORAL ONCE
Status: COMPLETED | OUTPATIENT
Start: 2023-12-25 | End: 2023-12-25

## 2023-12-25 RX ORDER — METOPROLOL TARTRATE 1 MG/ML
5 INJECTION, SOLUTION INTRAVENOUS ONCE
Status: COMPLETED | OUTPATIENT
Start: 2023-12-25 | End: 2023-12-25

## 2023-12-25 RX ORDER — METOPROLOL TARTRATE 1 MG/ML
5 INJECTION, SOLUTION INTRAVENOUS ONCE
Status: DISCONTINUED | OUTPATIENT
Start: 2023-12-25 | End: 2023-12-25 | Stop reason: HOSPADM

## 2023-12-25 RX ADMIN — POTASSIUM CHLORIDE 40 MEQ: 750 TABLET, FILM COATED, EXTENDED RELEASE ORAL at 00:57

## 2023-12-25 RX ADMIN — SODIUM CHLORIDE, POTASSIUM CHLORIDE, SODIUM LACTATE AND CALCIUM CHLORIDE 1000 ML: 600; 310; 30; 20 INJECTION, SOLUTION INTRAVENOUS at 00:35

## 2023-12-25 RX ADMIN — METOPROLOL TARTRATE 5 MG: 5 INJECTION INTRAVENOUS at 01:10

## 2023-12-25 RX ADMIN — METOPROLOL TARTRATE 5 MG: 5 INJECTION INTRAVENOUS at 00:41

## 2023-12-25 NOTE — ED PROVIDER NOTES
"HPI   Chief Complaint   Patient presents with    Irregular Heart Beat     Chest tightness accompanied with \"fluttering of heart rate\" as stated by patient, denies chest pain.       This is a 53-year-old female with past medical history of hypertension, anxiety, and non-insulin-dependent type 2 diabetes presenting to the emergency department from home with chief complaint of palpitations.  Patient states her dad has A-fib but she has never been diagnosed with it.  She states that for the last 3 to 4 hours since about 8 PM, she has been feeling a fluttering sensation in her chest as well as some central chest pressure.  Chest pressure does not radiate.  The palpitations have not yet gone away prompting her to present to the ED.  She feels slightly lightheaded and short of breath but denies any recent URI symptoms, nausea, vomiting.  Denies any episodes of syncope.  Does endorse drinking 3 alcoholic beverages between 5 PM and 7 PM tonight, does not drink daily.  Otherwise denies drug use.      History provided by:  Patient and spouse   used: No                          Charles Coma Scale Score: 15                  Patient History   No past medical history on file.  No past surgical history on file.  No family history on file.  Social History     Tobacco Use    Smoking status: Not on file    Smokeless tobacco: Not on file   Substance Use Topics    Alcohol use: Not on file    Drug use: Not on file       Physical Exam     Physical Exam  Constitutional:       General: She is not in acute distress.     Appearance: She is well-developed. She is not toxic-appearing.   HENT:      Head: Normocephalic and atraumatic.      Mouth/Throat:      Mouth: Mucous membranes are moist.   Eyes:      Conjunctiva/sclera: Conjunctivae normal.      Pupils: Pupils are equal, round, and reactive to light.   Cardiovascular:      Rate and Rhythm: Tachycardia present. Rhythm irregular.      Pulses: Normal pulses.   Pulmonary:     "  Effort: Pulmonary effort is normal.      Breath sounds: Normal breath sounds.   Abdominal:      General: There is no distension.      Palpations: Abdomen is soft.      Tenderness: There is no abdominal tenderness.   Musculoskeletal:         General: Normal range of motion.      Cervical back: Neck supple.   Skin:     General: Skin is warm and dry.   Neurological:      Mental Status: She is alert. Mental status is at baseline.      Motor: Motor function is intact.         Labs Reviewed   CBC WITH AUTO DIFFERENTIAL   PHOSPHORUS   MAGNESIUM   COMPREHENSIVE METABOLIC PANEL   TROPONIN I, HIGH SENSITIVITY   B-TYPE NATRIURETIC PEPTIDE   TSH WITH REFLEX TO FREE T4 IF ABNORMAL   HUMAN CHORIONIC GONADOTROPIN, SERUM QUANTITATIVE       ED Course & MDM   ED Course as of 01/03/24 1053   Mon Dec 25, 2023   0016 EKG on my interpretation at 2338 on 12/24: Sinus tachycardia with rate 123 bpm, left axis, MA interval 200 ms.  QTc 425 ms.  No STEMI. [EC]   0016 EKG on my interpretation at 0012 on 12/25: Atrial fibrillation with rate 111 bpm, left axis, QTc 487 ms.  No STEMI. [EC]   0230 EKG on my interpretation at 0215 on 12/25: Normal sinus rhythm with rate 66,  ms, QTc 436 ms, no STEMI. [EC]      ED Course User Index  [EC] Aysha Long, DO         Diagnoses as of 01/03/24 1053   Paroxysmal atrial fibrillation (CMS/HCC)       Medical Decision Making  This is a 53-year-old female with history of hypertension, anxiety, type II NIDDM, and family history but no personal history of atrial fibrillation presenting to the emergency department with palpitations in the setting of having 3 alcoholic beverages tonight.  On arrival, she is hemodynamically stable aside from mild tachycardia and is in no acute distress.  She is alert and mentating appropriately with no focal neurologic deficits identified on exam.  Does have an irregular heart rhythm in the low 100s to 110s with otherwise benign and reassuring exam.  Initial twelve-lead EKG on  my independent interpretation demonstrates atrial fibrillation with otherwise no signs of ischemia as documented above.  Basic labs reveal mild hypokalemia and are otherwise benign and reassuring with no leukocytosis, normal TSH, negative hCG, negative troponin, negative BNP.  Potassium repleted orally.  Patient was given IV metoprolol 5 mg x 2 as well as 1 L of IV fluids after which she converted to normal sinus rhythm and became asymptomatic with no further palpitations.  No chest pain.  Believe atrial fibrillation likely triggered by alcohol consumption tonight.  Is already on metoprolol for hypertension, counseled regarding continuing to take this medication until cardiology follow-up.  As such, patient is appropriate for discharge home with cardiology follow-up as well as strict return precautions.  She verbalized understanding and agreement to this plan and was discharged in stable condition.          Procedure  Procedures    Aysha Long DO  Emergency Medicine     Aysha Long DO  01/03/24 1059

## 2023-12-26 ENCOUNTER — APPOINTMENT (OUTPATIENT)
Dept: CARDIOLOGY | Facility: HOSPITAL | Age: 53
End: 2023-12-26
Payer: COMMERCIAL

## 2023-12-26 PROCEDURE — 93005 ELECTROCARDIOGRAM TRACING: CPT

## 2024-01-10 LAB
ATRIAL RATE: 112 BPM
ATRIAL RATE: 66 BPM
P AXIS: 8 DEGREES
PR INTERVAL: 128 MS
PR INTERVAL: 205 MS
Q ONSET: 249 MS
Q ONSET: 252 MS
QRS COUNT: 11 BEATS
QRS COUNT: 17 BEATS
QRS DURATION: 94 MS
QRS DURATION: 95 MS
QT INTERVAL: 358 MS
QT INTERVAL: 416 MS
QTC CALCULATION(BAZETT): 436 MS
QTC CALCULATION(BAZETT): 487 MS
QTC FREDERICIA: 429 MS
QTC FREDERICIA: 439 MS
R AXIS: -10 DEGREES
R AXIS: -3 DEGREES
T AXIS: -3 DEGREES
T AXIS: 2 DEGREES
T OFFSET: 431 MS
T OFFSET: 457 MS
VENTRICULAR RATE: 111 BPM
VENTRICULAR RATE: 66 BPM

## 2024-02-02 DIAGNOSIS — E11.9 TYPE 2 DIABETES MELLITUS WITHOUT COMPLICATION, WITHOUT LONG-TERM CURRENT USE OF INSULIN (HCC): ICD-10-CM

## 2024-02-02 DIAGNOSIS — I10 ESSENTIAL HYPERTENSION: ICD-10-CM

## 2024-02-02 LAB
ALBUMIN SERPL-MCNC: 4.5 G/DL (ref 3.5–5.2)
ALP BLD-CCNC: 50 U/L (ref 35–104)
ALT SERPL-CCNC: 20 U/L (ref 0–32)
ANION GAP SERPL CALCULATED.3IONS-SCNC: 13 MMOL/L (ref 7–16)
AST SERPL-CCNC: 25 U/L (ref 0–31)
BILIRUB SERPL-MCNC: 0.4 MG/DL (ref 0–1.2)
BUN BLDV-MCNC: 14 MG/DL (ref 6–20)
CALCIUM SERPL-MCNC: 9.5 MG/DL (ref 8.6–10.2)
CHLORIDE BLD-SCNC: 103 MMOL/L (ref 98–107)
CHOLESTEROL: 209 MG/DL
CO2: 26 MMOL/L (ref 22–29)
CREAT SERPL-MCNC: 1.1 MG/DL (ref 0.5–1)
GFR SERPL CREATININE-BSD FRML MDRD: >60 ML/MIN/1.73M2
GLUCOSE BLD-MCNC: 130 MG/DL (ref 74–99)
HBA1C MFR BLD: 6.4 % (ref 4–5.6)
HDLC SERPL-MCNC: 43 MG/DL
LDL CHOLESTEROL: 128 MG/DL
POTASSIUM SERPL-SCNC: 5 MMOL/L (ref 3.5–5)
SODIUM BLD-SCNC: 142 MMOL/L (ref 132–146)
TOTAL PROTEIN: 7.5 G/DL (ref 6.4–8.3)
TRIGL SERPL-MCNC: 192 MG/DL
VLDLC SERPL CALC-MCNC: 38 MG/DL

## 2024-02-07 ENCOUNTER — OFFICE VISIT (OUTPATIENT)
Dept: FAMILY MEDICINE CLINIC | Age: 54
End: 2024-02-07
Payer: COMMERCIAL

## 2024-02-07 VITALS
HEIGHT: 72 IN | WEIGHT: 253 LBS | SYSTOLIC BLOOD PRESSURE: 126 MMHG | HEART RATE: 52 BPM | OXYGEN SATURATION: 96 % | TEMPERATURE: 97 F | DIASTOLIC BLOOD PRESSURE: 84 MMHG | BODY MASS INDEX: 34.27 KG/M2 | RESPIRATION RATE: 16 BRPM

## 2024-02-07 DIAGNOSIS — E78.2 MIXED HYPERLIPIDEMIA: ICD-10-CM

## 2024-02-07 DIAGNOSIS — E11.9 TYPE 2 DIABETES MELLITUS WITHOUT COMPLICATION, WITHOUT LONG-TERM CURRENT USE OF INSULIN (HCC): Primary | ICD-10-CM

## 2024-02-07 DIAGNOSIS — F10.90 UNCOMPLICATED ALCOHOL USE: ICD-10-CM

## 2024-02-07 PROCEDURE — 3079F DIAST BP 80-89 MM HG: CPT | Performed by: FAMILY MEDICINE

## 2024-02-07 PROCEDURE — 3044F HG A1C LEVEL LT 7.0%: CPT | Performed by: FAMILY MEDICINE

## 2024-02-07 PROCEDURE — 99215 OFFICE O/P EST HI 40 MIN: CPT | Performed by: FAMILY MEDICINE

## 2024-02-07 PROCEDURE — 3074F SYST BP LT 130 MM HG: CPT | Performed by: FAMILY MEDICINE

## 2024-02-07 RX ORDER — TRIAMTERENE/HYDROCHLOROTHIAZID 37.5-25 MG
TABLET ORAL
COMMUNITY
Start: 2023-07-21

## 2024-02-07 RX ORDER — METFORMIN HYDROCHLORIDE 500 MG/1
500 TABLET, EXTENDED RELEASE ORAL
COMMUNITY
Start: 2023-07-21 | End: 2024-07-21

## 2024-02-07 RX ORDER — MULTIVITAMIN
TABLET ORAL
COMMUNITY
Start: 2006-03-23

## 2024-02-07 RX ORDER — FLUTICASONE PROPIONATE 50 MCG
2 SPRAY, SUSPENSION (ML) NASAL DAILY
COMMUNITY
Start: 2023-07-21 | End: 2024-07-21

## 2024-02-07 RX ORDER — TRAZODONE HYDROCHLORIDE 150 MG/1
150 TABLET ORAL
COMMUNITY
Start: 2023-07-21

## 2024-02-07 RX ORDER — GABAPENTIN 100 MG/1
100 CAPSULE ORAL
COMMUNITY
Start: 2023-07-21 | End: 2024-07-21

## 2024-02-07 RX ORDER — CALCIUM CARBONATE/VITAMIN D3 500-10/5ML
2 LIQUID (ML) ORAL
COMMUNITY
Start: 2023-07-21 | End: 2024-07-21

## 2024-02-07 RX ORDER — METOPROLOL SUCCINATE 50 MG/1
50 TABLET, EXTENDED RELEASE ORAL
COMMUNITY
End: 2024-02-12 | Stop reason: WASHOUT

## 2024-02-07 RX ORDER — ESCITALOPRAM OXALATE 20 MG/1
20 TABLET ORAL
COMMUNITY
Start: 2023-07-21

## 2024-02-07 RX ORDER — ALBUTEROL SULFATE 90 UG/1
2 AEROSOL, METERED RESPIRATORY (INHALATION)
COMMUNITY
Start: 2023-07-21 | End: 2024-07-21

## 2024-02-07 RX ORDER — CHOLECALCIFEROL (VITAMIN D3) 125 MCG
125 CAPSULE ORAL DAILY
COMMUNITY
Start: 2023-07-21 | End: 2024-07-21

## 2024-02-07 ASSESSMENT — ENCOUNTER SYMPTOMS
COUGH: 0
ABDOMINAL PAIN: 0
VOMITING: 0
CONSTIPATION: 1
SORE THROAT: 0
SHORTNESS OF BREATH: 0
BLOOD IN STOOL: 0
DIARRHEA: 0
WHEEZING: 0
NAUSEA: 0
BACK PAIN: 0

## 2024-02-07 ASSESSMENT — PATIENT HEALTH QUESTIONNAIRE - PHQ9
1. LITTLE INTEREST OR PLEASURE IN DOING THINGS: 0
5. POOR APPETITE OR OVEREATING: 0
SUM OF ALL RESPONSES TO PHQ QUESTIONS 1-9: 0
7. TROUBLE CONCENTRATING ON THINGS, SUCH AS READING THE NEWSPAPER OR WATCHING TELEVISION: 0
4. FEELING TIRED OR HAVING LITTLE ENERGY: 0
7. TROUBLE CONCENTRATING ON THINGS, SUCH AS READING THE NEWSPAPER OR WATCHING TELEVISION: NOT AT ALL
8. MOVING OR SPEAKING SO SLOWLY THAT OTHER PEOPLE COULD HAVE NOTICED. OR THE OPPOSITE - BEING SO FIDGETY OR RESTLESS THAT YOU HAVE BEEN MOVING AROUND A LOT MORE THAN USUAL: NOT AT ALL
9. THOUGHTS THAT YOU WOULD BE BETTER OFF DEAD, OR OF HURTING YOURSELF: NOT AT ALL
2. FEELING DOWN, DEPRESSED OR HOPELESS: 0
3. TROUBLE FALLING OR STAYING ASLEEP: 0
SUM OF ALL RESPONSES TO PHQ QUESTIONS 1-9: 0
8. MOVING OR SPEAKING SO SLOWLY THAT OTHER PEOPLE COULD HAVE NOTICED. OR THE OPPOSITE, BEING SO FIGETY OR RESTLESS THAT YOU HAVE BEEN MOVING AROUND A LOT MORE THAN USUAL: 0
6. FEELING BAD ABOUT YOURSELF - OR THAT YOU ARE A FAILURE OR HAVE LET YOURSELF OR YOUR FAMILY DOWN: NOT AT ALL
10. IF YOU CHECKED OFF ANY PROBLEMS, HOW DIFFICULT HAVE THESE PROBLEMS MADE IT FOR YOU TO DO YOUR WORK, TAKE CARE OF THINGS AT HOME, OR GET ALONG WITH OTHER PEOPLE: 0
3. TROUBLE FALLING OR STAYING ASLEEP: NOT AT ALL
4. FEELING TIRED OR HAVING LITTLE ENERGY: NOT AT ALL
10. IF YOU CHECKED OFF ANY PROBLEMS, HOW DIFFICULT HAVE THESE PROBLEMS MADE IT FOR YOU TO DO YOUR WORK, TAKE CARE OF THINGS AT HOME, OR GET ALONG WITH OTHER PEOPLE: NOT DIFFICULT AT ALL
SUM OF ALL RESPONSES TO PHQ QUESTIONS 1-9: 0
5. POOR APPETITE OR OVEREATING: NOT AT ALL
SUM OF ALL RESPONSES TO PHQ QUESTIONS 1-9: 0
6. FEELING BAD ABOUT YOURSELF - OR THAT YOU ARE A FAILURE OR HAVE LET YOURSELF OR YOUR FAMILY DOWN: 0
9. THOUGHTS THAT YOU WOULD BE BETTER OFF DEAD, OR OF HURTING YOURSELF: 0
2. FEELING DOWN, DEPRESSED OR HOPELESS: NOT AT ALL
SUM OF ALL RESPONSES TO PHQ9 QUESTIONS 1 & 2: 0
1. LITTLE INTEREST OR PLEASURE IN DOING THINGS: NOT AT ALL
SUM OF ALL RESPONSES TO PHQ QUESTIONS 1-9: 0

## 2024-02-07 NOTE — PROGRESS NOTES
Encounters:   02/07/24 34.31 kg/m²   07/21/23 33.91 kg/m²   04/21/23 33.77 kg/m²       Physical Exam  Constitutional:       General: She is not in acute distress.     Appearance: Normal appearance. She is well-developed and overweight. She is not ill-appearing, toxic-appearing or diaphoretic.   HENT:      Head: Normocephalic and atraumatic.      Right Ear: External ear normal.      Left Ear: External ear normal.      Mouth/Throat:      Pharynx: No oropharyngeal exudate.   Eyes:      General: No scleral icterus.        Right eye: No discharge.      Conjunctiva/sclera: Conjunctivae normal.      Pupils: Pupils are equal, round, and reactive to light.   Neck:      Thyroid: No thyromegaly.   Cardiovascular:      Rate and Rhythm: Normal rate and regular rhythm.      Heart sounds: Normal heart sounds. No murmur heard.  Pulmonary:      Effort: Pulmonary effort is normal. No respiratory distress.      Breath sounds: No stridor. No wheezing or rales.   Chest:      Chest wall: No tenderness.   Abdominal:      General: Bowel sounds are normal. There is no distension.      Palpations: Abdomen is soft. There is no mass.      Tenderness: There is no abdominal tenderness. There is no guarding.   Musculoskeletal:         General: No tenderness. Normal range of motion.      Cervical back: Normal range of motion and neck supple.   Lymphadenopathy:      Cervical: No cervical adenopathy.   Skin:     General: Skin is warm and dry.      Coloration: Skin is not pale.      Findings: No erythema or rash.   Neurological:      Mental Status: She is alert and oriented to person, place, and time.   Psychiatric:         Attention and Perception: Attention and perception normal.         Mood and Affect: Mood and affect normal. Mood is not anxious or depressed. Affect is not blunt or tearful.         Speech: Speech normal.         Behavior: Behavior normal. Behavior is cooperative.         Thought Content: Thought content normal.         Cognition

## 2024-02-07 NOTE — PATIENT INSTRUCTIONS
SMART GOAL TO DECREASE ALCOHOL CONSUMPTION:    WHY: TO IMPROVE HGBA1C, TO LOSE WEIGHT, AND GET/STAY HEALTHY    HOW: IN ORDER TO IMPROVE LAB NUMBERS, AVOID ALCOHOL. SUBSTITUTE DIET SODA OR SPARKLING WATER IN PLACE OF ALCOHOL.    START WEDNESDAY, FEBRUARY 7, 2024.    FOLLOW UP WITH DR. CALDWELL IN THREE (3) MONTHS WITH LAB WORK 1 WEEK PRIOR

## 2024-02-11 NOTE — PROGRESS NOTES
Counseling:  The patient was counseled regarding diagnostic results, instructions for management, risk factor reductions, prognosis, patient and family education, impressions, risks and benefits of treatment options and importance of compliance with treatment.      Chief Complaint:  The patient presents today for cardiovascular evaluation of atrial fibrillation.     History Of Present Illness:    Maritza Garcia is a 53 y.o. female patient whose PMH is significant for HTN, anxiety and DM type 2. She presents today in the company of her  to establish cardiovascular care for the evaluation and management of a-fib. The patient was evaluated in the ED on 12/24/2023 for a chief complaint of palpitations/fluttering. While in the ED, 3 EKGs were performed; the second of which showed a-fib at a rate of 111 bpm. She was given IV metoprolol x2 and IV fluids and subsequently converted to NSR with resolution of symptoms. The cause of her episode of a-fib was deemed to be s/t alcohol consumption, and as she is already on metoprolol for HTN, she was discharged home the same day with recommendations for outpatient followup. The patient denies any past history of a-fib. She reports occasional brief episodes of palpitations/fluttering. She denies any CP, chest discomfort, presyncope or syncope. She reports occasional exertional SOB when carrying things up stairs. Her family history is positive for a-fib in her father who had an ablation. The patient is on metoprolol and triamterene-HCTZ for management of HTN, which has been stable.     Past Surgical History:  She has no past surgical history on file.      Social History:  She reports that she has quit smoking. Her smoking use included cigarettes. She has never used smokeless tobacco. No history on file for alcohol use and drug use.    Family History:  No family history on file.     Allergies:  Lisinopril    Outpatient Medications:  Current Outpatient Medications    Medication Instructions    albuterol 90 mcg/actuation inhaler 2 puffs, inhalation    cholecalciferol (VITAMIN D-3) 125 mcg, oral, Daily    escitalopram (LEXAPRO) 20 mg, oral    fluticasone (Flonase) 50 mcg/actuation nasal spray 2 sprays, nasal, Daily    gabapentin (NEURONTIN) 100 mg, oral    magnesium oxide 400 mg magnesium capsule 2 capsules, oral    metFORMIN XR (GLUCOPHAGE-XR) 500 mg, oral    metoprolol succinate XL (TOPROL-XL) 25 mg, oral, Daily    multivitamin tablet oral    traZODone (DESYREL) 150 mg    triamterene-hydrochlorothiazid (Maxzide-25) 37.5-25 mg tablet         Last Recorded Vitals:  Vitals:    02/12/24 1314   BP: (!) 118/94   BP Location: Left arm   Patient Position: Sitting   BP Cuff Size: Large adult   Pulse: 52   SpO2: 96%   Weight: 116 kg (255 lb 6.4 oz)   Height: 1.829 m (6')       Review of Systems   Cardiovascular:  Positive for dyspnea on exertion and palpitations.   All other systems reviewed and are negative.     Physical Exam:  Constitutional:       Appearance: Healthy appearance. Not in distress.   Neck:      Vascular: No JVR. JVD normal.   Pulmonary:      Effort: Pulmonary effort is normal.      Breath sounds: Normal breath sounds. No wheezing. No rhonchi. No rales.   Chest:      Chest wall: Not tender to palpatation.   Cardiovascular:      PMI at left midclavicular line. Normal rate. Regular rhythm. Normal S1. Normal S2.       Murmurs: There is no murmur.      No gallop.  No click. No rub.   Pulses:     Intact distal pulses.   Edema:     Peripheral edema absent.   Abdominal:      General: Bowel sounds are normal.      Palpations: Abdomen is soft.      Tenderness: There is no abdominal tenderness.   Musculoskeletal: Normal range of motion.         General: No tenderness. Skin:     General: Skin is warm and dry.   Neurological:      General: No focal deficit present.      Mental Status: Alert and oriented to person, place and time.       Last Labs:  CBC -  Lab Results   Component  Value Date    WBC 9.3 12/25/2023    HGB 14.7 12/25/2023    HCT 41.2 12/25/2023    MCV 88 12/25/2023     12/25/2023       CMP -  Lab Results   Component Value Date    CALCIUM 9.0 12/24/2023    PHOS 3.4 12/24/2023    PROT 6.8 12/24/2023    ALBUMIN 4.0 12/24/2023    AST 19 12/24/2023    ALT 18 12/24/2023    ALKPHOS 50 12/24/2023    BILITOT 0.3 12/24/2023       RENAL FUNCTION PANEL -   Lab Results   Component Value Date    GLUCOSE 120 (H) 12/24/2023     12/24/2023    K 3.2 (L) 12/24/2023     12/24/2023    CO2 26 12/24/2023    ANIONGAP 12 12/24/2023    BUN 14 12/24/2023    CREATININE 1.04 12/24/2023    CALCIUM 9.0 12/24/2023    PHOS 3.4 12/24/2023    ALBUMIN 4.0 12/24/2023        Lab Results   Component Value Date    BNP 16 12/24/2023    HGBA1C 6.4 (H) 02/02/2024       Last Cardiology Tests:  N/A    Diagnostic review: I have personally reviewed the result(s) of the EKG.    Assessment/Plan   1) Atrial Fibrillation  ED evaluation 12/24/2023 for palpitations/fluttering  3 EKGs performed, the second showing a-fib with a HR of 111 bpm  Received IV metoprolol x2 and IV fluids, converting to SR  Patient on metoprolol succinate 25 mg daily and triamterene-HCTZ 37.5-25 mg daily for management of HTN   Denies CP, chest discomfort, presyncope, syncope  Reports occasional SOB when carrying items up stairs  Reports occasional brief episodes of palpitations/fluttering   FH positive for a-fib s/p ablation in father   Advised to increase physical activity  Advised to limit/avoid caffeine and alcohol  Check echo       Scribe Attestation  By signing my name below, I, Guerita Leslie   attest that this documentation has been prepared under the direction and in the presence of Yaron Shelton MD.

## 2024-02-12 ENCOUNTER — OFFICE VISIT (OUTPATIENT)
Dept: CARDIOLOGY | Facility: CLINIC | Age: 54
End: 2024-02-12
Payer: COMMERCIAL

## 2024-02-12 VITALS
OXYGEN SATURATION: 96 % | DIASTOLIC BLOOD PRESSURE: 94 MMHG | WEIGHT: 255.4 LBS | HEART RATE: 52 BPM | BODY MASS INDEX: 34.59 KG/M2 | SYSTOLIC BLOOD PRESSURE: 118 MMHG | HEIGHT: 72 IN

## 2024-02-12 DIAGNOSIS — I48.91 ATRIAL FIBRILLATION, UNSPECIFIED TYPE (MULTI): ICD-10-CM

## 2024-02-12 PROBLEM — I48.0 PAROXYSMAL ATRIAL FIBRILLATION (MULTI): Status: ACTIVE | Noted: 2024-02-12

## 2024-02-12 PROCEDURE — 1036F TOBACCO NON-USER: CPT | Performed by: INTERNAL MEDICINE

## 2024-02-12 PROCEDURE — 99203 OFFICE O/P NEW LOW 30 MIN: CPT | Performed by: INTERNAL MEDICINE

## 2024-02-12 PROCEDURE — 93000 ELECTROCARDIOGRAM COMPLETE: CPT | Performed by: INTERNAL MEDICINE

## 2024-02-12 RX ORDER — METOPROLOL SUCCINATE 25 MG/1
25 TABLET, EXTENDED RELEASE ORAL DAILY
COMMUNITY
Start: 2023-12-18 | End: 2024-03-16 | Stop reason: HOSPADM

## 2024-02-12 ASSESSMENT — ENCOUNTER SYMPTOMS
DYSPNEA ON EXERTION: 1
PALPITATIONS: 1

## 2024-02-12 NOTE — PROGRESS NOTES
[facility]  HISTORY & PHYSICAL  ATTENDING NOTE    Chief Complaint   Patient presents with    New Patient     new patient referral from Dr Sammy Jimenez for colonoscopy screening consult. patient states her mother has had colon polyps removed. no family history of colon cancer though. patient states she has pain in LRQ that she has had ultrasounds on but no findings and is continuing to have pain.  Other     patient has never had colonoscopy     HPI:  53y/o F presents for persistent RLQ pain since November. Nothing makes it worse of better. She denies constipation, diarrhea, N/V.  US of the pelvis was normal.  She denies melena, hematochezia. Her mother has a history of colon polyps, but she does not know much more than that. She denies family history of colon cancer, Crohn's disease or colitis.     Past Medical History:   Diagnosis Date    ADHD (attention deficit hyperactivity disorder)     Fear of travel with panic attacks     Hypertension     Right ankle injury 4/2015     Past Surgical History:   Procedure Laterality Date    CHOLECYSTECTOMY  2003    OVARIAN CYST SURGERY  1990     Family History   Problem Relation Age of Onset    High Blood Pressure Mother     Colon Polyps Mother     High Blood Pressure Father     Arrhythmia Father         A-fib; S/P ablation    ADHD Son     Alcohol Abuse Maternal Grandfather     Alcohol Abuse Paternal Grandfather      Social History   Substance Use Topics    Smoking status: Former Smoker     Packs/day: 0.25     Types: Cigarettes     Quit date: 4/15/2011    Smokeless tobacco: Never Used    Alcohol use 3.0 - 4.2 oz/week     5 - 7 Glasses of wine per week      Comment: (11/3/15):  1 glass wine/daily     Allergies   Allergen Reactions    Ace Inhibitors Hives and Itching    Cat Hair Extract      Congested    Lisinopril      Current Outpatient Prescriptions   Medication Sig Dispense Refill    polyethylene glycol (MIRALAX) powder Take 500 Patient is due now for a CPE with labs prior with Dr. Marmolejo. Please call to schedule.    Route back once appointment is made and a refill will be provided.

## 2024-02-12 NOTE — Clinical Note
February 12, 2024       No Recipients    Patient: Maritza Garcia   YOB: 1970   Date of Visit: 2/12/2024       Dear Dr. Kwon Recipients:    Thank you for referring Maritza Garcia to me for evaluation. Below are my notes for this consultation.  If you have questions, please do not hesitate to call me. I look forward to following your patient along with you.       Sincerely,     Yaron Shelton MD      CC:   No Recipients  ______________________________________________________________________________________    Counseling:  The patient was counseled regarding diagnostic results, instructions for management, risk factor reductions, prognosis, patient and family education, impressions, risks and benefits of treatment options and importance of compliance with treatment.      Chief Complaint:  The patient presents today for cardiovascular evaluation of atrial fibrillation.     History Of Present Illness:    Maritza Garcia is a 53 y.o. female patient whose PMH is significant for HTN, anxiety and DM type 2. She presents today in the company of her  to establish cardiovascular care for the evaluation and management of a-fib. The patient was evaluated in the ED on 12/24/2023 for a chief complaint of palpitations/fluttering. While in the ED, 3 EKGs were performed; the second of which showed a-fib at a rate of 111 bpm. She was given IV metoprolol x2 and IV fluids and subsequently converted to NSR with resolution of symptoms. The cause of her episode of a-fib was deemed to be s/t alcohol consumption, and as she is already on metoprolol for HTN, she was discharged home the same day with recommendations for outpatient followup. The patient denies any past history of a-fib. She reports occasional brief episodes of palpitations/fluttering. She denies any CP, chest discomfort, presyncope or syncope. She reports occasional exertional SOB when carrying things up stairs. Her family history is positive for  a-fib in her father who had an ablation. The patient is on metoprolol and triamterene-HCTZ for management of HTN, which has been stable.     Past Surgical History:  She has no past surgical history on file.      Social History:  She reports that she has quit smoking. Her smoking use included cigarettes. She has never used smokeless tobacco. No history on file for alcohol use and drug use.    Family History:  No family history on file.     Allergies:  Lisinopril    Outpatient Medications:  Current Outpatient Medications   Medication Instructions    albuterol 90 mcg/actuation inhaler 2 puffs, inhalation    cholecalciferol (VITAMIN D-3) 125 mcg, oral, Daily    escitalopram (LEXAPRO) 20 mg, oral    fluticasone (Flonase) 50 mcg/actuation nasal spray 2 sprays, nasal, Daily    gabapentin (NEURONTIN) 100 mg, oral    magnesium oxide 400 mg magnesium capsule 2 capsules, oral    metFORMIN XR (GLUCOPHAGE-XR) 500 mg, oral    metoprolol succinate XL (TOPROL-XL) 25 mg, oral, Daily    multivitamin tablet oral    traZODone (DESYREL) 150 mg    triamterene-hydrochlorothiazid (Maxzide-25) 37.5-25 mg tablet         Last Recorded Vitals:  Vitals:    02/12/24 1314   BP: (!) 118/94   BP Location: Left arm   Patient Position: Sitting   BP Cuff Size: Large adult   Pulse: 52   SpO2: 96%   Weight: 116 kg (255 lb 6.4 oz)   Height: 1.829 m (6')       Review of Systems   Cardiovascular:  Positive for dyspnea on exertion and palpitations.   All other systems reviewed and are negative.     Physical Exam:  Constitutional:       Appearance: Healthy appearance. Not in distress.   Neck:      Vascular: No JVR. JVD normal.   Pulmonary:      Effort: Pulmonary effort is normal.      Breath sounds: Normal breath sounds. No wheezing. No rhonchi. No rales.   Chest:      Chest wall: Not tender to palpatation.   Cardiovascular:      PMI at left midclavicular line. Normal rate. Regular rhythm. Normal S1. Normal S2.       Murmurs: There is no murmur.      No  gallop.  No click. No rub.   Pulses:     Intact distal pulses.   Edema:     Peripheral edema absent.   Abdominal:      General: Bowel sounds are normal.      Palpations: Abdomen is soft.      Tenderness: There is no abdominal tenderness.   Musculoskeletal: Normal range of motion.         General: No tenderness. Skin:     General: Skin is warm and dry.   Neurological:      General: No focal deficit present.      Mental Status: Alert and oriented to person, place and time.       Last Labs:  CBC -  Lab Results   Component Value Date    WBC 9.3 12/25/2023    HGB 14.7 12/25/2023    HCT 41.2 12/25/2023    MCV 88 12/25/2023     12/25/2023       CMP -  Lab Results   Component Value Date    CALCIUM 9.0 12/24/2023    PHOS 3.4 12/24/2023    PROT 6.8 12/24/2023    ALBUMIN 4.0 12/24/2023    AST 19 12/24/2023    ALT 18 12/24/2023    ALKPHOS 50 12/24/2023    BILITOT 0.3 12/24/2023       RENAL FUNCTION PANEL -   Lab Results   Component Value Date    GLUCOSE 120 (H) 12/24/2023     12/24/2023    K 3.2 (L) 12/24/2023     12/24/2023    CO2 26 12/24/2023    ANIONGAP 12 12/24/2023    BUN 14 12/24/2023    CREATININE 1.04 12/24/2023    CALCIUM 9.0 12/24/2023    PHOS 3.4 12/24/2023    ALBUMIN 4.0 12/24/2023        Lab Results   Component Value Date    BNP 16 12/24/2023    HGBA1C 6.4 (H) 02/02/2024       Last Cardiology Tests:  N/A    Diagnostic review: I have personally reviewed the result(s) of the EKG.    Assessment/Plan  1) Atrial Fibrillation  ED evaluation 12/24/2023 for palpitations/fluttering  3 EKGs performed, the second showing a-fib with a HR of 111 bpm  Received IV metoprolol x2 and IV fluids, converting to SR  Patient on metoprolol succinate 25 mg daily and triamterene-HCTZ 37.5-25 mg daily for management of HTN   Denies CP, chest discomfort, presyncope, syncope  Reports occasional SOB when carrying items up stairs  Reports occasional brief episodes of palpitations/fluttering   FH positive for a-fib s/p  ablation in father   Advised to increase physical activity  Advised to limit/avoid caffeine  Check echo       Scribe Attestation  By signing my name below, I, Guerita Leslie   attest that this documentation has been prepared under the direction and in the presence of Yaron Shelton MD.

## 2024-02-12 NOTE — PATIENT INSTRUCTIONS
Continue all current medications as prescribed.   Increase your physical activity.  Limit/avoid caffeine and alcohol.  Dr. Shelton has ordered an echocardiogram (ultrasound of the heart) to evaluate your heart function and structure.  Followup with Dr. Shelton after the above test.    If you have any questions or cardiac concerns, please call our office at [phone].

## 2024-02-13 ENCOUNTER — TELEPHONE (OUTPATIENT)
Dept: FAMILY MEDICINE CLINIC | Age: 54
End: 2024-02-13

## 2024-02-27 ENCOUNTER — HOSPITAL ENCOUNTER (OUTPATIENT)
Dept: CARDIOLOGY | Facility: HOSPITAL | Age: 54
Discharge: HOME | End: 2024-02-27
Payer: COMMERCIAL

## 2024-02-27 DIAGNOSIS — I48.91 ATRIAL FIBRILLATION, UNSPECIFIED TYPE (MULTI): ICD-10-CM

## 2024-02-27 PROCEDURE — 93306 TTE W/DOPPLER COMPLETE: CPT

## 2024-02-27 PROCEDURE — 93306 TTE W/DOPPLER COMPLETE: CPT | Performed by: INTERNAL MEDICINE

## 2024-02-28 LAB
EJECTION FRACTION APICAL 4 CHAMBER: 73.9
EJECTION FRACTION: 70 %
LEFT ATRIUM VOLUME AREA LENGTH INDEX BSA: 19 ML/M2
LEFT VENTRICLE INTERNAL DIMENSION DIASTOLE: 4.47 CM (ref 3.5–6)
LEFT VENTRICULAR OUTFLOW TRACT DIAMETER: 1.83 CM
MITRAL VALVE E/A RATIO: 1.15
RIGHT VENTRICLE FREE WALL PEAK S': 15.3 CM/S
RIGHT VENTRICLE PEAK SYSTOLIC PRESSURE: 26.7 MMHG
TRICUSPID ANNULAR PLANE SYSTOLIC EXCURSION: 2.8 CM

## 2024-03-12 PROBLEM — E11.9 TYPE 2 DIABETES MELLITUS WITHOUT COMPLICATION, WITHOUT LONG-TERM CURRENT USE OF INSULIN (MULTI): Status: ACTIVE | Noted: 2023-01-17

## 2024-03-12 PROBLEM — I10 HIGH BLOOD PRESSURE: Status: ACTIVE | Noted: 2024-03-12

## 2024-03-12 PROBLEM — G44.209 TENSION HEADACHE: Status: ACTIVE | Noted: 2020-05-04

## 2024-03-12 PROBLEM — F10.90: Status: ACTIVE | Noted: 2024-02-07

## 2024-03-12 PROBLEM — E78.2 MIXED HYPERLIPIDEMIA: Status: ACTIVE | Noted: 2024-02-07

## 2024-03-12 PROBLEM — F40.01 FEAR OF TRAVEL WITH PANIC ATTACKS: Status: ACTIVE | Noted: 2018-07-30

## 2024-03-12 PROBLEM — R20.2 PARESTHESIA: Status: ACTIVE | Noted: 2021-11-21

## 2024-03-12 PROBLEM — J30.81 ALLERGIC RHINITIS DUE TO ANIMAL HAIR AND DANDER: Status: ACTIVE | Noted: 2020-05-04

## 2024-03-12 PROBLEM — F98.8 ADULT ATTENTION DEFICIT DISORDER: Status: ACTIVE | Noted: 2022-07-13

## 2024-03-12 PROBLEM — F41.9 ANXIETY AND DEPRESSION: Status: ACTIVE | Noted: 2018-11-28

## 2024-03-12 PROBLEM — F32.A ANXIETY AND DEPRESSION: Status: ACTIVE | Noted: 2018-11-28

## 2024-03-12 PROBLEM — E11.9 DIABETES (MULTI): Status: ACTIVE | Noted: 2023-07-01

## 2024-03-12 PROBLEM — R73.03 PREDIABETES: Status: ACTIVE | Noted: 2021-11-21

## 2024-03-15 ENCOUNTER — APPOINTMENT (OUTPATIENT)
Dept: CARDIOLOGY | Facility: HOSPITAL | Age: 54
End: 2024-03-15
Payer: COMMERCIAL

## 2024-03-15 ENCOUNTER — APPOINTMENT (OUTPATIENT)
Dept: RADIOLOGY | Facility: HOSPITAL | Age: 54
End: 2024-03-15
Payer: COMMERCIAL

## 2024-03-15 ENCOUNTER — HOSPITAL ENCOUNTER (OUTPATIENT)
Facility: HOSPITAL | Age: 54
Setting detail: OBSERVATION
Discharge: HOME | End: 2024-03-16
Attending: EMERGENCY MEDICINE | Admitting: INTERNAL MEDICINE
Payer: COMMERCIAL

## 2024-03-15 DIAGNOSIS — R06.00 DYSPNEA, UNSPECIFIED TYPE: ICD-10-CM

## 2024-03-15 DIAGNOSIS — I48.0 PAROXYSMAL ATRIAL FIBRILLATION (MULTI): Primary | ICD-10-CM

## 2024-03-15 LAB
ALBUMIN SERPL BCP-MCNC: 4.3 G/DL (ref 3.4–5)
ALP SERPL-CCNC: 64 U/L (ref 33–110)
ALT SERPL W P-5'-P-CCNC: 18 U/L (ref 7–45)
ANION GAP SERPL CALC-SCNC: 14 MMOL/L (ref 10–20)
AST SERPL W P-5'-P-CCNC: 25 U/L (ref 9–39)
BASOPHILS # BLD AUTO: 0.07 X10*3/UL (ref 0–0.1)
BASOPHILS NFR BLD AUTO: 0.8 %
BILIRUB SERPL-MCNC: 0.2 MG/DL (ref 0–1.2)
BNP SERPL-MCNC: 16 PG/ML (ref 0–99)
BUN SERPL-MCNC: 22 MG/DL (ref 6–23)
CALCIUM SERPL-MCNC: 8.9 MG/DL (ref 8.6–10.3)
CARDIAC TROPONIN I PNL SERPL HS: 10 NG/L (ref 0–13)
CHLORIDE SERPL-SCNC: 102 MMOL/L (ref 98–107)
CO2 SERPL-SCNC: 25 MMOL/L (ref 21–32)
CREAT SERPL-MCNC: 0.89 MG/DL (ref 0.5–1.05)
EGFRCR SERPLBLD CKD-EPI 2021: 78 ML/MIN/1.73M*2
EOSINOPHIL # BLD AUTO: 0.21 X10*3/UL (ref 0–0.7)
EOSINOPHIL NFR BLD AUTO: 2.3 %
ERYTHROCYTE [DISTWIDTH] IN BLOOD BY AUTOMATED COUNT: 11.9 % (ref 11.5–14.5)
GLUCOSE SERPL-MCNC: 159 MG/DL (ref 74–99)
HCT VFR BLD AUTO: 41.6 % (ref 36–46)
HGB BLD-MCNC: 14.8 G/DL (ref 12–16)
IMM GRANULOCYTES # BLD AUTO: 0.03 X10*3/UL (ref 0–0.7)
IMM GRANULOCYTES NFR BLD AUTO: 0.3 % (ref 0–0.9)
LYMPHOCYTES # BLD AUTO: 3.37 X10*3/UL (ref 1.2–4.8)
LYMPHOCYTES NFR BLD AUTO: 36.9 %
MCH RBC QN AUTO: 32.3 PG (ref 26–34)
MCHC RBC AUTO-ENTMCNC: 36.9 G/DL (ref 32–36)
MCV RBC AUTO: 87 FL (ref 80–100)
MONOCYTES # BLD AUTO: 0.59 X10*3/UL (ref 0.1–1)
MONOCYTES NFR BLD AUTO: 6.5 %
NEUTROPHILS # BLD AUTO: 4.86 X10*3/UL (ref 1.2–7.7)
NEUTROPHILS NFR BLD AUTO: 53.2 %
NRBC BLD-RTO: 0 /100 WBCS (ref 0–0)
PLATELET # BLD AUTO: 309 X10*3/UL (ref 150–450)
POTASSIUM SERPL-SCNC: 3.5 MMOL/L (ref 3.5–5.3)
PROT SERPL-MCNC: 7 G/DL (ref 6.4–8.2)
RBC # BLD AUTO: 4.78 X10*6/UL (ref 4–5.2)
SODIUM SERPL-SCNC: 137 MMOL/L (ref 136–145)
WBC # BLD AUTO: 9.1 X10*3/UL (ref 4.4–11.3)

## 2024-03-15 PROCEDURE — 36415 COLL VENOUS BLD VENIPUNCTURE: CPT | Performed by: EMERGENCY MEDICINE

## 2024-03-15 PROCEDURE — 93005 ELECTROCARDIOGRAM TRACING: CPT

## 2024-03-15 PROCEDURE — 99285 EMERGENCY DEPT VISIT HI MDM: CPT | Mod: 25

## 2024-03-15 PROCEDURE — 83880 ASSAY OF NATRIURETIC PEPTIDE: CPT | Performed by: EMERGENCY MEDICINE

## 2024-03-15 PROCEDURE — 71046 X-RAY EXAM CHEST 2 VIEWS: CPT | Performed by: RADIOLOGY

## 2024-03-15 PROCEDURE — 80053 COMPREHEN METABOLIC PANEL: CPT | Performed by: EMERGENCY MEDICINE

## 2024-03-15 PROCEDURE — 85025 COMPLETE CBC W/AUTO DIFF WBC: CPT | Performed by: EMERGENCY MEDICINE

## 2024-03-15 PROCEDURE — 71046 X-RAY EXAM CHEST 2 VIEWS: CPT

## 2024-03-15 PROCEDURE — 84484 ASSAY OF TROPONIN QUANT: CPT | Performed by: EMERGENCY MEDICINE

## 2024-03-15 PROCEDURE — 83735 ASSAY OF MAGNESIUM: CPT | Performed by: EMERGENCY MEDICINE

## 2024-03-15 ASSESSMENT — COLUMBIA-SUICIDE SEVERITY RATING SCALE - C-SSRS
1. IN THE PAST MONTH, HAVE YOU WISHED YOU WERE DEAD OR WISHED YOU COULD GO TO SLEEP AND NOT WAKE UP?: NO
6. HAVE YOU EVER DONE ANYTHING, STARTED TO DO ANYTHING, OR PREPARED TO DO ANYTHING TO END YOUR LIFE?: NO
2. HAVE YOU ACTUALLY HAD ANY THOUGHTS OF KILLING YOURSELF?: NO

## 2024-03-16 VITALS
BODY MASS INDEX: 34.4 KG/M2 | RESPIRATION RATE: 18 BRPM | WEIGHT: 254 LBS | OXYGEN SATURATION: 96 % | TEMPERATURE: 97.3 F | HEART RATE: 52 BPM | HEIGHT: 72 IN | DIASTOLIC BLOOD PRESSURE: 83 MMHG | SYSTOLIC BLOOD PRESSURE: 142 MMHG

## 2024-03-16 PROBLEM — R07.9 CHEST PAIN: Status: ACTIVE | Noted: 2024-03-16

## 2024-03-16 LAB
ALBUMIN SERPL BCP-MCNC: 3.5 G/DL (ref 3.4–5)
ANION GAP SERPL CALC-SCNC: 11 MMOL/L (ref 10–20)
ANION GAP SERPL CALC-SCNC: 11 MMOL/L (ref 10–20)
BUN SERPL-MCNC: 16 MG/DL (ref 6–23)
BUN SERPL-MCNC: 20 MG/DL (ref 6–23)
CALCIUM SERPL-MCNC: 7.3 MG/DL (ref 8.6–10.3)
CALCIUM SERPL-MCNC: 8.7 MG/DL (ref 8.6–10.3)
CARDIAC TROPONIN I PNL SERPL HS: 10 NG/L (ref 0–13)
CHLORIDE SERPL-SCNC: 104 MMOL/L (ref 98–107)
CHLORIDE SERPL-SCNC: 106 MMOL/L (ref 98–107)
CHOLEST SERPL-MCNC: 150 MG/DL (ref 0–199)
CHOLESTEROL/HDL RATIO: 5.6
CO2 SERPL-SCNC: 26 MMOL/L (ref 21–32)
CO2 SERPL-SCNC: 26 MMOL/L (ref 21–32)
CREAT SERPL-MCNC: 0.89 MG/DL (ref 0.5–1.05)
CREAT SERPL-MCNC: 0.92 MG/DL (ref 0.5–1.05)
EGFRCR SERPLBLD CKD-EPI 2021: 75 ML/MIN/1.73M*2
EGFRCR SERPLBLD CKD-EPI 2021: 78 ML/MIN/1.73M*2
ERYTHROCYTE [DISTWIDTH] IN BLOOD BY AUTOMATED COUNT: 12.1 % (ref 11.5–14.5)
GLUCOSE BLD MANUAL STRIP-MCNC: 109 MG/DL (ref 74–99)
GLUCOSE BLD MANUAL STRIP-MCNC: 173 MG/DL (ref 74–99)
GLUCOSE SERPL-MCNC: 120 MG/DL (ref 74–99)
GLUCOSE SERPL-MCNC: 132 MG/DL (ref 74–99)
HCT VFR BLD AUTO: 42.9 % (ref 36–46)
HDLC SERPL-MCNC: 26.8 MG/DL
HGB BLD-MCNC: 14.9 G/DL (ref 12–16)
LDLC SERPL CALC-MCNC: ABNORMAL MG/DL
MAGNESIUM SERPL-MCNC: 1.87 MG/DL (ref 1.6–2.4)
MAGNESIUM SERPL-MCNC: 2.27 MG/DL (ref 1.6–2.4)
MCH RBC QN AUTO: 31.2 PG (ref 26–34)
MCHC RBC AUTO-ENTMCNC: 34.7 G/DL (ref 32–36)
MCV RBC AUTO: 90 FL (ref 80–100)
NON HDL CHOLESTEROL: 123 MG/DL (ref 0–149)
NRBC BLD-RTO: 0 /100 WBCS (ref 0–0)
PHOSPHATE SERPL-MCNC: 2.7 MG/DL (ref 2.5–4.9)
PLATELET # BLD AUTO: 319 X10*3/UL (ref 150–450)
POTASSIUM SERPL-SCNC: 2.7 MMOL/L (ref 3.5–5.3)
POTASSIUM SERPL-SCNC: 4 MMOL/L (ref 3.5–5.3)
RBC # BLD AUTO: 4.78 X10*6/UL (ref 4–5.2)
SODIUM SERPL-SCNC: 137 MMOL/L (ref 136–145)
SODIUM SERPL-SCNC: 140 MMOL/L (ref 136–145)
TRIGL SERPL-MCNC: 492 MG/DL (ref 0–149)
TSH SERPL-ACNC: 2.29 MIU/L (ref 0.44–3.98)
VLDL: ABNORMAL
WBC # BLD AUTO: 6.6 X10*3/UL (ref 4.4–11.3)

## 2024-03-16 PROCEDURE — 82947 ASSAY GLUCOSE BLOOD QUANT: CPT

## 2024-03-16 PROCEDURE — 84443 ASSAY THYROID STIM HORMONE: CPT | Performed by: INTERNAL MEDICINE

## 2024-03-16 PROCEDURE — 36415 COLL VENOUS BLD VENIPUNCTURE: CPT | Performed by: INTERNAL MEDICINE

## 2024-03-16 PROCEDURE — 2500000004 HC RX 250 GENERAL PHARMACY W/ HCPCS (ALT 636 FOR OP/ED): Performed by: INTERNAL MEDICINE

## 2024-03-16 PROCEDURE — 99236 HOSP IP/OBS SAME DATE HI 85: CPT | Performed by: INTERNAL MEDICINE

## 2024-03-16 PROCEDURE — 99222 1ST HOSP IP/OBS MODERATE 55: CPT | Performed by: INTERNAL MEDICINE

## 2024-03-16 PROCEDURE — G0378 HOSPITAL OBSERVATION PER HR: HCPCS

## 2024-03-16 PROCEDURE — 80048 BASIC METABOLIC PNL TOTAL CA: CPT | Mod: CCI | Performed by: INTERNAL MEDICINE

## 2024-03-16 PROCEDURE — 2500000001 HC RX 250 WO HCPCS SELF ADMINISTERED DRUGS (ALT 637 FOR MEDICARE OP): Performed by: INTERNAL MEDICINE

## 2024-03-16 PROCEDURE — 2500000004 HC RX 250 GENERAL PHARMACY W/ HCPCS (ALT 636 FOR OP/ED): Performed by: EMERGENCY MEDICINE

## 2024-03-16 PROCEDURE — 80069 RENAL FUNCTION PANEL: CPT | Performed by: INTERNAL MEDICINE

## 2024-03-16 PROCEDURE — 96375 TX/PRO/DX INJ NEW DRUG ADDON: CPT

## 2024-03-16 PROCEDURE — 2500000002 HC RX 250 W HCPCS SELF ADMINISTERED DRUGS (ALT 637 FOR MEDICARE OP, ALT 636 FOR OP/ED): Performed by: INTERNAL MEDICINE

## 2024-03-16 PROCEDURE — 83735 ASSAY OF MAGNESIUM: CPT | Performed by: INTERNAL MEDICINE

## 2024-03-16 PROCEDURE — 96366 THER/PROPH/DIAG IV INF ADDON: CPT

## 2024-03-16 PROCEDURE — 80061 LIPID PANEL: CPT | Performed by: INTERNAL MEDICINE

## 2024-03-16 PROCEDURE — 96365 THER/PROPH/DIAG IV INF INIT: CPT

## 2024-03-16 PROCEDURE — 85027 COMPLETE CBC AUTOMATED: CPT | Performed by: INTERNAL MEDICINE

## 2024-03-16 RX ORDER — HEPARIN SODIUM 5000 [USP'U]/ML
5000 INJECTION, SOLUTION INTRAVENOUS; SUBCUTANEOUS EVERY 8 HOURS
Status: DISCONTINUED | OUTPATIENT
Start: 2024-03-16 | End: 2024-03-16

## 2024-03-16 RX ORDER — FUROSEMIDE 10 MG/ML
20 INJECTION INTRAMUSCULAR; INTRAVENOUS DAILY
Status: DISCONTINUED | OUTPATIENT
Start: 2024-03-16 | End: 2024-03-16

## 2024-03-16 RX ORDER — METOPROLOL SUCCINATE 50 MG/1
50 TABLET, EXTENDED RELEASE ORAL DAILY
Qty: 30 TABLET | Refills: 0 | Status: SHIPPED | OUTPATIENT
Start: 2024-03-17 | End: 2024-04-15

## 2024-03-16 RX ORDER — ONDANSETRON HYDROCHLORIDE 2 MG/ML
4 INJECTION, SOLUTION INTRAVENOUS EVERY 6 HOURS PRN
Status: DISCONTINUED | OUTPATIENT
Start: 2024-03-16 | End: 2024-03-16 | Stop reason: HOSPADM

## 2024-03-16 RX ORDER — METOPROLOL SUCCINATE 25 MG/1
25 TABLET, EXTENDED RELEASE ORAL DAILY
Status: DISCONTINUED | OUTPATIENT
Start: 2024-03-16 | End: 2024-03-16

## 2024-03-16 RX ORDER — ALBUTEROL SULFATE 90 UG/1
2 AEROSOL, METERED RESPIRATORY (INHALATION) EVERY 6 HOURS PRN
Status: DISCONTINUED | OUTPATIENT
Start: 2024-03-16 | End: 2024-03-16 | Stop reason: HOSPADM

## 2024-03-16 RX ORDER — POTASSIUM CHLORIDE 750 MG/1
40 TABLET, FILM COATED, EXTENDED RELEASE ORAL ONCE
Status: COMPLETED | OUTPATIENT
Start: 2024-03-16 | End: 2024-03-16

## 2024-03-16 RX ORDER — ESCITALOPRAM OXALATE 10 MG/1
20 TABLET ORAL DAILY
Status: DISCONTINUED | OUTPATIENT
Start: 2024-03-16 | End: 2024-03-16 | Stop reason: HOSPADM

## 2024-03-16 RX ORDER — POTASSIUM CHLORIDE 14.9 MG/ML
20 INJECTION INTRAVENOUS
Status: COMPLETED | OUTPATIENT
Start: 2024-03-16 | End: 2024-03-16

## 2024-03-16 RX ORDER — FUROSEMIDE 10 MG/ML
40 INJECTION INTRAMUSCULAR; INTRAVENOUS ONCE
Status: COMPLETED | OUTPATIENT
Start: 2024-03-16 | End: 2024-03-16

## 2024-03-16 RX ORDER — DEXTROSE 50 % IN WATER (D50W) INTRAVENOUS SYRINGE
25
Status: DISCONTINUED | OUTPATIENT
Start: 2024-03-16 | End: 2024-03-16 | Stop reason: HOSPADM

## 2024-03-16 RX ORDER — INSULIN LISPRO 100 [IU]/ML
0-5 INJECTION, SOLUTION INTRAVENOUS; SUBCUTANEOUS
Status: DISCONTINUED | OUTPATIENT
Start: 2024-03-16 | End: 2024-03-16 | Stop reason: HOSPADM

## 2024-03-16 RX ORDER — DEXTROSE 50 % IN WATER (D50W) INTRAVENOUS SYRINGE
12.5
Status: DISCONTINUED | OUTPATIENT
Start: 2024-03-16 | End: 2024-03-16 | Stop reason: HOSPADM

## 2024-03-16 RX ORDER — METOPROLOL SUCCINATE 50 MG/1
50 TABLET, EXTENDED RELEASE ORAL DAILY
Status: DISCONTINUED | OUTPATIENT
Start: 2024-03-17 | End: 2024-03-16

## 2024-03-16 RX ORDER — ACETAMINOPHEN 325 MG/1
650 TABLET ORAL EVERY 4 HOURS PRN
Status: DISCONTINUED | OUTPATIENT
Start: 2024-03-16 | End: 2024-03-16 | Stop reason: HOSPADM

## 2024-03-16 RX ORDER — METOPROLOL SUCCINATE 50 MG/1
50 TABLET, EXTENDED RELEASE ORAL DAILY
Status: DISCONTINUED | OUTPATIENT
Start: 2024-03-16 | End: 2024-03-16 | Stop reason: HOSPADM

## 2024-03-16 RX ADMIN — APIXABAN 5 MG: 5 TABLET, FILM COATED ORAL at 10:34

## 2024-03-16 RX ADMIN — POTASSIUM CHLORIDE 40 MEQ: 750 TABLET, FILM COATED, EXTENDED RELEASE ORAL at 10:34

## 2024-03-16 RX ADMIN — POTASSIUM CHLORIDE 20 MEQ: 14.9 INJECTION, SOLUTION INTRAVENOUS at 10:34

## 2024-03-16 RX ADMIN — FUROSEMIDE 40 MG: 10 INJECTION, SOLUTION INTRAVENOUS at 01:24

## 2024-03-16 RX ADMIN — HEPARIN SODIUM 5000 UNITS: 5000 INJECTION INTRAVENOUS; SUBCUTANEOUS at 03:58

## 2024-03-16 RX ADMIN — METOPROLOL SUCCINATE 50 MG: 50 TABLET, EXTENDED RELEASE ORAL at 10:34

## 2024-03-16 RX ADMIN — POTASSIUM CHLORIDE 20 MEQ: 14.9 INJECTION, SOLUTION INTRAVENOUS at 13:19

## 2024-03-16 RX ADMIN — ESCITALOPRAM OXALATE 20 MG: 10 TABLET ORAL at 10:34

## 2024-03-16 SDOH — SOCIAL STABILITY: SOCIAL INSECURITY: ARE THERE ANY APPARENT SIGNS OF INJURIES/BEHAVIORS THAT COULD BE RELATED TO ABUSE/NEGLECT?: NO

## 2024-03-16 SDOH — SOCIAL STABILITY: SOCIAL INSECURITY: HAS ANYONE EVER THREATENED TO HURT YOUR FAMILY OR YOUR PETS?: NO

## 2024-03-16 SDOH — SOCIAL STABILITY: SOCIAL INSECURITY: DO YOU FEEL UNSAFE GOING BACK TO THE PLACE WHERE YOU ARE LIVING?: NO

## 2024-03-16 SDOH — SOCIAL STABILITY: SOCIAL INSECURITY: ARE YOU OR HAVE YOU BEEN THREATENED OR ABUSED PHYSICALLY, EMOTIONALLY, OR SEXUALLY BY ANYONE?: NO

## 2024-03-16 SDOH — SOCIAL STABILITY: SOCIAL INSECURITY: DOES ANYONE TRY TO KEEP YOU FROM HAVING/CONTACTING OTHER FRIENDS OR DOING THINGS OUTSIDE YOUR HOME?: NO

## 2024-03-16 SDOH — SOCIAL STABILITY: SOCIAL INSECURITY: ABUSE: ADULT

## 2024-03-16 SDOH — SOCIAL STABILITY: SOCIAL INSECURITY: WERE YOU ABLE TO COMPLETE ALL THE BEHAVIORAL HEALTH SCREENINGS?: YES

## 2024-03-16 SDOH — SOCIAL STABILITY: SOCIAL INSECURITY: HAVE YOU HAD THOUGHTS OF HARMING ANYONE ELSE?: NO

## 2024-03-16 SDOH — SOCIAL STABILITY: SOCIAL INSECURITY: DO YOU FEEL ANYONE HAS EXPLOITED OR TAKEN ADVANTAGE OF YOU FINANCIALLY OR OF YOUR PERSONAL PROPERTY?: NO

## 2024-03-16 ASSESSMENT — ACTIVITIES OF DAILY LIVING (ADL)
HEARING - LEFT EAR: FUNCTIONAL
TOILETING: INDEPENDENT
JUDGMENT_ADEQUATE_SAFELY_COMPLETE_DAILY_ACTIVITIES: YES
HEARING - RIGHT EAR: FUNCTIONAL
LACK_OF_TRANSPORTATION: NO
ADEQUATE_TO_COMPLETE_ADL: YES
PATIENT'S MEMORY ADEQUATE TO SAFELY COMPLETE DAILY ACTIVITIES?: YES
GROOMING: INDEPENDENT
DRESSING YOURSELF: INDEPENDENT
BATHING: INDEPENDENT
FEEDING YOURSELF: INDEPENDENT
ASSISTIVE_DEVICE: EYEGLASSES
WALKS IN HOME: INDEPENDENT

## 2024-03-16 ASSESSMENT — ENCOUNTER SYMPTOMS
MUSCULOSKELETAL NEGATIVE: 1
ALLERGIC/IMMUNOLOGIC NEGATIVE: 1
GASTROINTESTINAL NEGATIVE: 1
PSYCHIATRIC NEGATIVE: 1
CONSTITUTIONAL NEGATIVE: 1
NEUROLOGICAL NEGATIVE: 1
EYES NEGATIVE: 1
ENDOCRINE NEGATIVE: 1
PALPITATIONS: 1
SHORTNESS OF BREATH: 1

## 2024-03-16 ASSESSMENT — LIFESTYLE VARIABLES
HAVE YOU OR SOMEONE ELSE BEEN INJURED AS A RESULT OF YOUR DRINKING: NO
HOW OFTEN DO YOU HAVE A DRINK CONTAINING ALCOHOL: 2-4 TIMES A MONTH
HOW OFTEN DURING THE LAST YEAR HAVE YOU FOUND THAT YOU WERE NOT ABLE TO STOP DRINKING ONCE YOU HAD STARTED: NEVER
HOW OFTEN DO YOU HAVE 6 OR MORE DRINKS ON ONE OCCASION: LESS THAN MONTHLY
AUDIT-C TOTAL SCORE: 3
AUDIT-C TOTAL SCORE: 3
AUDIT TOTAL SCORE: 3
HAS A RELATIVE, FRIEND, DOCTOR, OR ANOTHER HEALTH PROFESSIONAL EXPRESSED CONCERN ABOUT YOUR DRINKING OR SUGGESTED YOU CUT DOWN: NO
HOW OFTEN DURING THE LAST YEAR HAVE YOU BEEN UNABLE TO REMEMBER WHAT HAPPENED THE NIGHT BEFORE BECAUSE YOU HAD BEEN DRINKING: NEVER
SKIP TO QUESTIONS 9-10: 0
HOW OFTEN DURING THE LAST YEAR HAVE YOU HAD A FEELING OF GUILT OR REMORSE AFTER DRINKING: NEVER
HOW MANY STANDARD DRINKS CONTAINING ALCOHOL DO YOU HAVE ON A TYPICAL DAY: 1 OR 2
HOW OFTEN DURING THE LAST YEAR HAVE YOU FAILED TO DO WHAT WAS NORMALLY EXPECTED FROM YOU BECAUSE OF DRINKING: NEVER
AUDIT TOTAL SCORE: 0
HOW OFTEN DURING THE LAST YEAR HAVE YOU NEEDED AN ALCOHOLIC DRINK FIRST THING IN THE MORNING TO GET YOURSELF GOING AFTER A NIGHT OF HEAVY DRINKING: NEVER

## 2024-03-16 ASSESSMENT — PAIN SCALES - GENERAL: PAINLEVEL_OUTOF10: 0 - NO PAIN

## 2024-03-16 ASSESSMENT — COGNITIVE AND FUNCTIONAL STATUS - GENERAL
MOBILITY SCORE: 24
MOBILITY SCORE: 24
DAILY ACTIVITIY SCORE: 24
PATIENT BASELINE BEDBOUND: NO
DAILY ACTIVITIY SCORE: 24

## 2024-03-16 ASSESSMENT — PATIENT HEALTH QUESTIONNAIRE - PHQ9
2. FEELING DOWN, DEPRESSED OR HOPELESS: NOT AT ALL
1. LITTLE INTEREST OR PLEASURE IN DOING THINGS: NOT AT ALL
SUM OF ALL RESPONSES TO PHQ9 QUESTIONS 1 & 2: 0

## 2024-03-16 ASSESSMENT — PAIN - FUNCTIONAL ASSESSMENT: PAIN_FUNCTIONAL_ASSESSMENT: 0-10

## 2024-03-16 NOTE — CARE PLAN
The patient's goals for the shift include Get rest    Problem: Diabetes  Goal: Achieve decreasing blood glucose levels by end of shift  3/16/2024 1727 by Aliya Montes RN  Outcome: Adequate for Discharge  3/16/2024 1254 by Aliya Montes RN  Outcome: Progressing  Goal: Increase stability of blood glucose readings by end of shift  3/16/2024 1727 by Aliya Montes RN  Outcome: Adequate for Discharge  3/16/2024 1254 by Aliya Montes RN  Outcome: Progressing  Goal: Decrease in ketones present in urine by end of shift  3/16/2024 1727 by Aliya Montes RN  Outcome: Adequate for Discharge  3/16/2024 1254 by Aliya Montes RN  Outcome: Progressing  Goal: Maintain electrolyte levels within acceptable range throughout shift  3/16/2024 1727 by Aliya Montes RN  Outcome: Adequate for Discharge  3/16/2024 1254 by Aliya Montes RN  Outcome: Progressing  Goal: Maintain glucose levels >70mg/dl to <250mg/dl throughout shift  3/16/2024 1727 by Aliya Montes RN  Outcome: Adequate for Discharge  3/16/2024 1254 by Aliya Montes RN  Outcome: Progressing  Goal: No changes in neurological exam by end of shift  3/16/2024 1727 by Aliya Montes RN  Outcome: Adequate for Discharge  3/16/2024 1254 by Aliya Montes RN  Outcome: Progressing  Goal: Learn about and adhere to nutrition recommendations by end of shift  3/16/2024 1727 by Aliya Montes RN  Outcome: Adequate for Discharge  3/16/2024 1254 by Aliya Montes RN  Outcome: Progressing  Goal: Vital signs within normal range for age by end of shift  3/16/2024 1727 by Aliya Montes RN  Outcome: Adequate for Discharge  3/16/2024 1254 by Aliya Montes RN  Outcome: Progressing  Goal: Increase self care and/or family involovement by end of shift  3/16/2024 1727 by Aliya Montes RN  Outcome: Adequate for Discharge  3/16/2024 1254 by Aliya Montes RN  Outcome: Progressing  Goal: Receive DSME education by end of shift  3/16/2024 1727 by Aliya Montes RN  Outcome:  Adequate for Discharge  3/16/2024 1254 by Aliya Montes, RN  Outcome: Progressing       The clinical goals for the shift include progressing    Discharged home this evening. Spouse present with patient. Patient voiced understanding of discharge papers, scripts sent to preferred pharmacy for pickup. IV removed.

## 2024-03-16 NOTE — DISCHARGE SUMMARY
Discharge Diagnosis  Chest pain    Issues Requiring Follow-Up  Follow-up with cardiology and need outpatient EP appointment.  Follow-up with PCP in a week.    Discharge Meds     Your medication list        START taking these medications        Instructions Last Dose Given Next Dose Due   apixaban 5 mg tablet  Commonly known as: Eliquis      Take 1 tablet (5 mg) by mouth every 12 hours.              CHANGE how you take these medications        Instructions Last Dose Given Next Dose Due   metoprolol succinate XL 50 mg 24 hr tablet  Commonly known as: Toprol-XL  Start taking on: March 17, 2024  What changed:   medication strength  how much to take  additional instructions      Take 1 tablet (50 mg) by mouth once daily. Do not crush or chew. Do not start before March 17, 2024.              CONTINUE taking these medications        Instructions Last Dose Given Next Dose Due   albuterol 90 mcg/actuation inhaler           cholecalciferol 125 MCG (5000 UT) capsule  Commonly known as: Vitamin D-3           escitalopram 20 mg tablet  Commonly known as: Lexapro           fluticasone 50 mcg/actuation nasal spray  Commonly known as: Flonase           gabapentin 100 mg capsule  Commonly known as: Neurontin           magnesium oxide 400 mg magnesium capsule           metFORMIN  mg 24 hr tablet  Commonly known as: Glucophage-XR           multivitamin tablet           traZODone 150 mg tablet  Commonly known as: Desyrel           triamterene-hydrochlorothiazid 37.5-25 mg tablet  Commonly known as: Maxzide-25                     Where to Get Your Medications        These medications were sent to [facility] [id number] [facility] [place] - [address] [address]  [address] [address] [facility] [place] [address]      Phone: [phone]   apixaban 5 mg tablet  metoprolol succinate XL 50 mg 24 hr tablet         Test Results Pending At Discharge  Pending Labs       No current pending labs.            Hospital Course   Maritza BALLARD  Jose is a 53 y.o. female with past medical history of hypertension, anxiety and type 2 diabetes mellitus and atrial fibrillation in the past.  Presented to the hospital with chest pain.  The patient states around 10 PM yesterday she was sitting in her chair and she developed some chest pressure and palpitations.  She states she was short of breath but denies any dizziness lightheadedness nausea vomiting at that time.  She was found to be in A-fib  and denies any active chest pain.  Cardiology saw the patient and they have started the patient on Eliquis and increase metoprolol to 50. Rate is currently controlled  Echo from last month shows LVEF of 65 to 70%. Does show diastolic dysfunction. RVSP within normal limits. Cardiology recommends they will have the patient be seen by EP for possible ablation in outpt settings. The pt also had hypokalemia that was replaced and recommend out pt lab follow up with PCP.  The patient has already been cleared for discharge by cardiology.     Pertinent Physical Exam At Time of Discharge  Physical Exam  Constitutional:       Appearance: She is obese.   HENT:      Head: Normocephalic and atraumatic.      Nose: Nose normal.      Mouth/Throat:      Mouth: Mucous membranes are dry.   Eyes:      Extraocular Movements: Extraocular movements intact.      Conjunctiva/sclera: Conjunctivae normal.   Cardiovascular:      Rate and Rhythm: Normal rate and regular rhythm.      Pulses: Normal pulses.      Heart sounds: Normal heart sounds.   Pulmonary:      Effort: Pulmonary effort is normal.      Breath sounds: Normal breath sounds.   Abdominal:      General: Bowel sounds are normal.      Palpations: Abdomen is soft.   Musculoskeletal:         General: Normal range of motion.      Cervical back: Normal range of motion and neck supple.   Skin:     General: Skin is warm and dry.   Neurological:      General: No focal deficit present.      Mental Status: She is alert and oriented to person, place,  and time. Mental status is at baseline.   Psychiatric:         Mood and Affect: Mood normal.         Outpatient Follow-Up  Future Appointments   Date Time Provider Department Center   3/21/2024  2:45 PM Yaron Shelton MD [id number]         Db Jack MD

## 2024-03-16 NOTE — CONSULTS
"Consults  History Of Present Illness:    Maritza Garcia is a 53 y.o. female whose PMH is significant for HTN, anxiety and DM type 2, initial diagnosis of atrial fibrillation in 12/2023,comes in with chest pain and palpitations - Noted to be in afib with RVR. Now in NSR. She feels better. No Cp.      Last Recorded Vitals:  Vitals:    03/16/24 0318 03/16/24 0452 03/16/24 0455 03/16/24 0900   BP: 128/75 109/67 109/67 138/78   BP Location: Left arm Left arm Left arm Left arm   Patient Position: Lying Lying Lying Lying   Pulse: 77 69 69 66   Resp: 18 16 16 16   Temp: 36.6 °C (97.8 °F) 36.1 °C (97 °F) 36.1 °C (97 °F) 37.4 °C (99.4 °F)   TempSrc: Temporal Temporal Temporal Temporal   SpO2: 94% 95% 95% 94%   Weight:  115 kg (254 lb) 115 kg (254 lb)    Height:  1.829 m (6' 0.01\") 1.829 m (6' 0.01\")        Last Labs:  CBC - 3/16/2024:  4:14 AM  6.6 14.9 319    42.9      CMP - 3/16/2024:  4:14 AM  7.3 7.0 25 --- 0.2   2.7 3.5 18 64      PTT - No results in last year.  _   _ _     Troponin I, High Sensitivity   Date/Time Value Ref Range Status   03/15/2024 11:55 PM 10 0 - 13 ng/L Final   03/15/2024 10:47 PM 10 0 - 13 ng/L Final   12/24/2023 11:57 PM 7 0 - 13 ng/L Final     BNP   Date/Time Value Ref Range Status   03/15/2024 10:47 PM 16 0 - 99 pg/mL Final   12/24/2023 11:57 PM 16 0 - 99 pg/mL Final     Hemoglobin A1C   Date/Time Value Ref Range Status   02/02/2024 09:47 AM 6.4 (H) 4.0 - 5.6 % Final   07/18/2023 10:06 AM 5.8 (H) 4.0 - 5.6 % Final     LDL Calculated   Date/Time Value Ref Range Status   03/16/2024 04:14 AM   Final     Comment:     The calculation of LDL and VLDL are inaccurate when the Triglycerides are greater than 400 mg/dL or when the patient is non-fasting. If LDL measurement is necessary contact the testing laboratory for an alternative LDL assay.                                  Near   Borderline      AGE      Desirable  Optimal    High     High     Very High     0-19 Y     0 - 109     ---    110-129   " >/= 130     ----    20-24 Y     0 - 119     ---    120-159   >/= 160     ----      >24 Y     0 -  99   100-129  130-159   160-189     >/=190       VLDL   Date/Time Value Ref Range Status   03/16/2024 04:14 AM   Final     Comment:     Unable to calculate VLDL.      Last I/O:  No intake/output data recorded.    Past Cardiology Tests (Last 3 Years):  EKG:  ECG 12 Lead 02/12/2024      ECG 12 lead 12/25/2023      ECG 12 lead 12/24/2023    Echo:  Transthoracic echo (TTE) complete 02/27/2024    Ejection Fractions:  EF   Date/Time Value Ref Range Status   02/27/2024 08:44 AM 70 %      Cath:  No results found for this or any previous visit from the past 1095 days.    Stress Test:  No results found for this or any previous visit from the past 1095 days.    Cardiac Imaging:  No results found for this or any previous visit from the past 1095 days.      Past Medical History:  She has no past medical history on file.    Past Surgical History:  She has no past surgical history on file.      Social History:  She reports that she has quit smoking. Her smoking use included cigarettes. She has never used smokeless tobacco. No history on file for alcohol use and drug use.    Family History:  No family history on file.     Allergies:  Lisinopril and Cat hair standardized allergenic extract    Inpatient Medications:  Scheduled medications   Medication Dose Route Frequency    apixaban  5 mg oral q12h    escitalopram  20 mg oral Daily    insulin lispro  0-5 Units subcutaneous With meals & nightly    [START ON 3/17/2024] metoprolol succinate XL  50 mg oral Daily     PRN medications   Medication    acetaminophen    albuterol    dextrose    dextrose    glucagon    ondansetron     Continuous Medications   Medication Dose Last Rate     Outpatient Medications:  Current Outpatient Medications   Medication Instructions    albuterol 90 mcg/actuation inhaler 2 puffs, inhalation    cholecalciferol (VITAMIN D-3) 125 mcg, oral, Daily    escitalopram  (LEXAPRO) 20 mg, oral    fluticasone (Flonase) 50 mcg/actuation nasal spray 2 sprays, nasal, Daily    gabapentin (NEURONTIN) 100 mg, oral    magnesium oxide 400 mg magnesium capsule 2 capsules, oral    metFORMIN XR (GLUCOPHAGE-XR) 500 mg, oral    metoprolol succinate XL (TOPROL-XL) 25 mg, oral, Daily    multivitamin tablet oral    traZODone (DESYREL) 150 mg    triamterene-hydrochlorothiazid (Maxzide-25) 37.5-25 mg tablet        Physical Exam:  Constitutional:       Appearance: Not in distress.   Eyes:      Pupils: Pupils are equal, round, and reactive to light.   HENT:      Nose: Nose normal.    Mouth/Throat:      Pharynx: Oropharynx is clear.   Pulmonary:      Effort: Pulmonary effort is normal.      Breath sounds: Normal breath sounds.   Cardiovascular:      PMI at left midclavicular line. Normal rate. Regular rhythm.      Murmurs: There is no murmur.   Abdominal:      General: Bowel sounds are normal.   Musculoskeletal: Normal range of motion.      Cervical back: Normal range of motion and neck supple. Skin:     General: Skin is warm.   Neurological:      General: No focal deficit present.      Mental Status: Alert and oriented to person, place and time.           Assessment/Plan   1) Paroxysmal atrial fibrillation  Start Eliquis  Increase Metoprolol to 50  Ok to Discharge home - Will have hersee EP as outpatient for possible ablation  Peripheral IV 03/15/24 20 G Right Antecubital (Active)   Site Assessment Clean;Dry;Intact 03/16/24 0326   Dressing Status Clean;Dry;Occlusive 03/16/24 0326   Number of days: 1       Code Status:  Full Code    I spent 30 minutes in the professional and overall care of this patient.        Yaron Shelton MD

## 2024-03-16 NOTE — H&P
History Of Present Illness  Maritza Garcia is a 53 y.o. female with past medical history of hypertension, anxiety and type 2 diabetes mellitus and atrial fibrillation in the past.  Presented to the hospital with chest pain.  The patient states around 10 PM yesterday she was sitting in her chair and she developed some chest pressure and palpitations.  She states she was short of breath but denies any dizziness lightheadedness nausea vomiting at that time.  She was found to be in A-fib with RVR and denies any type of chest pain.  Cardiology has already seen the patient and they have started the patient on Eliquis and increase metoprolol to 50.  Cardiology recommends they will have the patient be seen by EP for possible ablation.  The patient has already been cleared for discharge by cardiology.     Past Medical History  She has no past medical history on file.    Surgical History  She has no past surgical history on file.     Social History  She reports that she has quit smoking. Her smoking use included cigarettes. She has never used smokeless tobacco. No history on file for alcohol use and drug use.    Family History  No family history on file.     Allergies  Lisinopril and Cat hair standardized allergenic extract    Review of Systems   Constitutional: Negative.    HENT: Negative.     Eyes: Negative.    Respiratory:  Positive for shortness of breath.    Cardiovascular:  Positive for chest pain and palpitations.   Gastrointestinal: Negative.    Endocrine: Negative.    Musculoskeletal: Negative.    Skin: Negative.    Allergic/Immunologic: Negative.    Neurological: Negative.    Psychiatric/Behavioral: Negative.     All other systems reviewed and are negative.       Physical Exam  Constitutional:       Appearance: She is obese.   HENT:      Head: Normocephalic and atraumatic.      Nose: Nose normal.      Mouth/Throat:      Mouth: Mucous membranes are dry.   Eyes:      Extraocular Movements: Extraocular movements  intact.      Conjunctiva/sclera: Conjunctivae normal.   Cardiovascular:      Rate and Rhythm: Normal rate and regular rhythm.      Pulses: Normal pulses.      Heart sounds: Normal heart sounds.   Pulmonary:      Effort: Pulmonary effort is normal.      Breath sounds: Normal breath sounds.   Abdominal:      General: Bowel sounds are normal.      Palpations: Abdomen is soft.   Musculoskeletal:         General: Normal range of motion.      Cervical back: Normal range of motion and neck supple.   Skin:     General: Skin is warm and dry.   Neurological:      General: No focal deficit present.      Mental Status: She is alert and oriented to person, place, and time. Mental status is at baseline.   Psychiatric:         Mood and Affect: Mood normal.          Last Recorded Vitals  /78 (BP Location: Left arm, Patient Position: Lying)   Pulse 66   Temp 37.4 °C (99.4 °F) (Temporal)   Resp 16   Wt 115 kg (254 lb)   SpO2 94%     Relevant Results        Maritza Garcia is a 53 y.o. female with past medical history of hypertension, anxiety and type 2 diabetes mellitus and atrial fibrillation in the past.  Presented to the hospital with chest pain.      Assessment/Plan   Principal Problem:    Chest pain    Atrial fibrillation with RVR  -Patient has episodes of A-fib in the past was deemed secondary to alcohol consumption in the past and has been on metoprolol but not on any anticoagulation in the past.  -Echo from last month shows LVEF of 65 to 70%.  Does show diastolic dysfunction.  RVSP within normal limits.  -Appreciate cardiology recommendations the patient has been started on Eliquis and metoprolol dose has been increased.  -Cardiology is planning to establish patient with EP in outpatient setting for possible ablation.    Hypertension  -Continue with metoprolol, triamterene hydrochlorothiazide.    Hypokalemia  -Potassium 2.7.  Replace orally and IV.  Will check potassium prior to discharge.    Diabetes  mellitus  -Continue with home medications    Anxiety and depression  -Continue with home medications.       Db Jack MD

## 2024-03-16 NOTE — ED NOTES
charito  Pt arrives to ED via car from home    Full Code    HPI   Chief Complaint   Patient presents with    Shortness of Breath    Chest Pain     Tightness, has hx afib, not on any blood thinners     /80   Pulse 81   Temp 36.7 °C (98 °F)   Resp 17   Wt 113 kg (250 lb)   SpO2 97%   Charles Coma Scale Score: 15      LDA:   Peripheral IV 03/15/24 20 G Right Antecubital (Active)   Placement Date/Time: 03/15/24 2247   Size (Gauge): 20 G  Orientation: Right  Location: Antecubital  Site Prep: Chlorhexidine   Insertion attempts: 1  Patient Tolerance: Tolerated well   Number of days: 0        BACKGROUND  No past medical history on file.  No past surgical history on file.  No current facility-administered medications on file prior to encounter.     Current Outpatient Medications on File Prior to Encounter   Medication Sig Dispense Refill    albuterol 90 mcg/actuation inhaler Inhale 2 puffs.      cholecalciferol (Vitamin D-3) 125 MCG (5000 UT) capsule Take 1 capsule (125 mcg) by mouth once daily.      escitalopram (Lexapro) 20 mg tablet Take 1 tablet (20 mg) by mouth.      fluticasone (Flonase) 50 mcg/actuation nasal spray Administer 2 sprays into affected nostril(s) once daily.      gabapentin (Neurontin) 100 mg capsule Take 1 capsule (100 mg) by mouth.      magnesium oxide 400 mg magnesium capsule Take 2 capsules (800 mg) by mouth.      metFORMIN  mg 24 hr tablet Take 1 tablet (500 mg) by mouth.      metoprolol succinate XL (Toprol-XL) 25 mg 24 hr tablet Take 1 tablet (25 mg) by mouth once daily.      multivitamin tablet Take by mouth.      traZODone (Desyrel) 150 mg tablet 1 tablet (150 mg).      triamterene-hydrochlorothiazid (Maxzide-25) 37.5-25 mg tablet           ASSESSMENT     ED Medication Administration from 03/15/2024 2219 to 03/16/2024 0228         Date/Time Order Dose Route Action Action by     03/16/2024 0124 EDT furosemide (Lasix) injection 40 mg 40 mg intravenous Given KASI Groves              RESULTS    Imaging:  XR chest 2 views   Final Result   1. Mild vascular congestion. No consolidation or pleural effusion.                  MACRO:   None.        Signed by: Aysha Laureano 3/15/2024 11:45 PM   Dictation workstation:   [id number]         }  Labs ::99  Abnormal Labs Reviewed   CBC WITH AUTO DIFFERENTIAL - Abnormal; Notable for the following components:       Result Value    MCHC 36.9 (*)     All other components within normal limits   COMPREHENSIVE METABOLIC PANEL - Abnormal; Notable for the following components:    Glucose 159 (*)     All other components within normal limits                 Patricia Groves RN  03/16/24 0229

## 2024-03-16 NOTE — CARE PLAN
The patient's goals for the shift include Get rest    Problem: Diabetes  Goal: Achieve decreasing blood glucose levels by end of shift  Outcome: Progressing  Goal: Increase stability of blood glucose readings by end of shift  Outcome: Progressing  Goal: Decrease in ketones present in urine by end of shift  Outcome: Progressing  Goal: Maintain electrolyte levels within acceptable range throughout shift  Outcome: Progressing  Goal: Maintain glucose levels >70mg/dl to <250mg/dl throughout shift  Outcome: Progressing  Goal: No changes in neurological exam by end of shift  Outcome: Progressing  Goal: Learn about and adhere to nutrition recommendations by end of shift  Outcome: Progressing  Goal: Vital signs within normal range for age by end of shift  Outcome: Progressing  Goal: Increase self care and/or family involovement by end of shift  Outcome: Progressing  Goal: Receive DSME education by end of shift  Outcome: Progressing       The clinical goals for the shift include progressing    See assessment and mar. Remains on room air. Plan to discharge home today after potassium replacement/recheck. See blood sugars. Tele maintained as ordered.

## 2024-03-18 ENCOUNTER — PATIENT OUTREACH (OUTPATIENT)
Dept: CARE COORDINATION | Facility: CLINIC | Age: 54
End: 2024-03-18
Payer: COMMERCIAL

## 2024-03-18 ENCOUNTER — TELEPHONE (OUTPATIENT)
Dept: CARDIOLOGY | Facility: CLINIC | Age: 54
End: 2024-03-18
Payer: COMMERCIAL

## 2024-03-18 DIAGNOSIS — I48.0 PAROXYSMAL ATRIAL FIBRILLATION (MULTI): Primary | ICD-10-CM

## 2024-03-18 NOTE — ED PROVIDER NOTES
Maritza Garcia is a 54 y.o. patient presenting to the ED for patient presenting to the emergency department for palpitations.  This is associated with pressure in the center of her chest.  It does not radiate.  She was short of breath earlier however this is since improved.  She denies any associated dizziness, lightheadedness, syncope.  No recent fever or feeling ill.    Additional History Obtained from: None  Limitations to History: None  ------------------------------------------------------------------------------------------------------------------------------------------  Physical Exam:  Appearance: Alert, cooperative,   Skin: Warm, dry, appropriate color for ethnicity.  Eyes: Cornea clear. No scleral icterus or injection.   ENT: Mucous membranes moist.  Pulmonary: No accessory muscle use or stridor. Clear lung sounds bilaterally without rhonchi or wheezing.   Cardiac: Heart sounds irregular and tachycardic without murmur. B/L radial pulses full and symmetric.   Abdomen: Soft, not tender.  No rebound or guarding.   Musculoskeletal: No gross deformities.   Neurological: Face symmetrical. Voice clear. Appropriately conversant.   Psychiatric: Appropriate mood and affect.    Medical Decision Making:  Chronic Medical Conditions Significantly Affecting Care:  has no past medical history on file.  A-fib, diabetes    Social Determinants of Health Significantly Affecting Care: None identified    Differential Diagnosis Considered but not limited to: A-fib, CHF, less likely ACS, electrolyte disturbance, viral illness      External Records Reviewed:   I reviewed recent and relevant outside records including:     Independent Interpretation of Studies: The following studies were ordered as part of the emergency department work up and independently interpreted by me. See ED Course for details.    EKG performed at 2233 and interpreted by me shows A-fib with a ventricular rate of 117.  There are occasional sinus beats.   There were no significant ST elevations or depressions.  Mildly flattened T waves.  No STEMI.    CBC is within normal limits.  BNP and troponin are normal.  Magnesium is normal.  TSH is normal.    Chest x-ray shows mild pulmonary vascular congestion.  Otherwise unremarkable.  Confirmed by radiology.    Diagnoses as of 03/18/24 0962   Paroxysmal atrial fibrillation (CMS/[facility])   Dyspnea, unspecified type         Escalation of Care:  The patient was treated with Lasix given her pulmonary vascular congestion and shortness of breath.      Discussion of Management with Other Providers:   I discussed the patient/results with: Dr. Estrada, hospitalist       Cecille Kang,   03/18/24 8299

## 2024-03-18 NOTE — TELEPHONE ENCOUNTER
"Received Epic Secure Chat from Dr. Shelton \"Needs referral to Dr. Salmon for afib\".    Referral placed and task sent to Saritha Rasmussen to schedule.   "

## 2024-03-19 LAB
ATRIAL RATE: 149 BPM
P AXIS: -7 DEGREES
PR INTERVAL: 174 MS
Q ONSET: 254 MS
QRS COUNT: 18 BEATS
QRS DURATION: 90 MS
QT INTERVAL: 361 MS
QTC CALCULATION(BAZETT): 504 MS
QTC FREDERICIA: 451 MS
R AXIS: -5 DEGREES
T AXIS: 38 DEGREES
T OFFSET: 434 MS
VENTRICULAR RATE: 117 BPM

## 2024-03-21 ENCOUNTER — APPOINTMENT (OUTPATIENT)
Dept: CARDIOLOGY | Facility: CLINIC | Age: 54
End: 2024-03-21
Payer: COMMERCIAL

## 2024-03-28 ENCOUNTER — TELEPHONE (OUTPATIENT)
Dept: FAMILY MEDICINE CLINIC | Age: 54
End: 2024-03-28

## 2024-03-28 DIAGNOSIS — G43.919 INTRACTABLE MIGRAINE WITHOUT STATUS MIGRAINOSUS, UNSPECIFIED MIGRAINE TYPE: Primary | ICD-10-CM

## 2024-03-31 RX ORDER — RIZATRIPTAN BENZOATE 5 MG/1
5 TABLET, ORALLY DISINTEGRATING ORAL
Qty: 12 TABLET | Refills: 3 | Status: SHIPPED | OUTPATIENT
Start: 2024-03-31 | End: 2024-03-31

## 2024-04-15 ENCOUNTER — PHARMACY VISIT (OUTPATIENT)
Dept: PHARMACY | Facility: CLINIC | Age: 54
End: 2024-04-15
Payer: COMMERCIAL

## 2024-04-15 ENCOUNTER — OFFICE VISIT (OUTPATIENT)
Dept: CARDIOLOGY | Facility: CLINIC | Age: 54
End: 2024-04-15
Payer: COMMERCIAL

## 2024-04-15 VITALS
HEART RATE: 56 BPM | HEIGHT: 72 IN | DIASTOLIC BLOOD PRESSURE: 70 MMHG | SYSTOLIC BLOOD PRESSURE: 110 MMHG | WEIGHT: 254 LBS | BODY MASS INDEX: 34.4 KG/M2

## 2024-04-15 DIAGNOSIS — I48.0 PAROXYSMAL ATRIAL FIBRILLATION (MULTI): Primary | ICD-10-CM

## 2024-04-15 PROCEDURE — 99214 OFFICE O/P EST MOD 30 MIN: CPT | Performed by: INTERNAL MEDICINE

## 2024-04-15 PROCEDURE — RXMED WILLOW AMBULATORY MEDICATION CHARGE

## 2024-04-15 PROCEDURE — 1036F TOBACCO NON-USER: CPT | Performed by: INTERNAL MEDICINE

## 2024-04-15 PROCEDURE — 93000 ELECTROCARDIOGRAM COMPLETE: CPT | Performed by: INTERNAL MEDICINE

## 2024-04-15 PROCEDURE — 3074F SYST BP LT 130 MM HG: CPT | Performed by: INTERNAL MEDICINE

## 2024-04-15 PROCEDURE — 3078F DIAST BP <80 MM HG: CPT | Performed by: INTERNAL MEDICINE

## 2024-04-15 RX ORDER — FLECAINIDE ACETATE 50 MG/1
50 TABLET ORAL 2 TIMES DAILY
Qty: 180 TABLET | Refills: 2 | Status: SHIPPED | OUTPATIENT
Start: 2024-04-15 | End: 2025-04-15

## 2024-04-15 RX ORDER — METOPROLOL SUCCINATE 25 MG/1
25 TABLET, EXTENDED RELEASE ORAL DAILY
Qty: 90 TABLET | Refills: 3 | Status: SHIPPED | OUTPATIENT
Start: 2024-04-15 | End: 2025-04-15

## 2024-04-15 NOTE — PROGRESS NOTES
[facility] Heart and Vascular Electrophysiology    Patient Name: Maritza Garcia  Patient : 1970    Referred  for   Chief Complaint   Patient presents with    New Patient Visit        Maritza Garcia is a 54 y.o. year old female patient with    HTN  DMII  Anxiety  Paroxysmal atrial fibrillation: Dec 2023, went to the ER. Converted after a few doses of IV metoprolol. Had a second episode on 3/2024. Came to the ER again with AF and converted spontaneously. Each episode lasted around 3-5 hours. Associated with palpitations, SOB and chest pain. Occasional palpitations at times, lasting seconds only.      Past Medical History:  She has a past medical history of Atrial fibrillation (Multi) (2023), Diabetes mellitus (Multi) (2023), and Hypertension.    Past Surgical History:  She has no past surgical history on file.      Social History:  She reports that she has quit smoking. Her smoking use included cigarettes. She has a 2.5 pack-year smoking history. She has never used smokeless tobacco. She reports current alcohol use of about 5.0 standard drinks of alcohol per week. She reports that she does not use drugs.    Family History:  Family History   Problem Relation Name Age of Onset    Hypertension Mother Libby     Abnormal EKG Father Romain     Arrhythmia Father Romain     Asthma Father Romain     Atrial fibrillation Father Romain     Diabetes type II Father Romain     Hypertension Father Romain     Cancer Maternal Grandfather Palomo     Lung disease Maternal Grandfather Palomo     Hypertension Maternal Grandmother Zhnana     Thyroid disease Maternal Grandmother Zhanna     Cancer Paternal Grandfather Lj     Diabetes type II Paternal Grandfather Lj     Hypertension Paternal Grandmother Kesha         Allergies:  Lisinopril and Cat hair standardized allergenic extract    Outpatient Medications:  Current Outpatient Medications   Medication Instructions    albuterol 90 mcg/actuation inhaler 2 puffs,  inhalation    cholecalciferol (VITAMIN D-3) 125 mcg, oral, Daily    Eliquis 5 mg, oral, Every 12 hours    escitalopram (LEXAPRO) 20 mg, oral    flecainide (TAMBOCOR) 50 mg, oral, 2 times daily    fluticasone (Flonase) 50 mcg/actuation nasal spray 2 sprays, nasal, Daily    gabapentin (NEURONTIN) 100 mg, oral    magnesium oxide 400 mg magnesium capsule 2 capsules, oral    metFORMIN XR (GLUCOPHAGE-XR) 500 mg, oral    metoprolol succinate XL (TOPROL-XL) 25 mg, oral, Daily, Do not crush or chew.    multivitamin tablet oral    traZODone (DESYREL) 150 mg    triamterene-hydrochlorothiazid (Maxzide-25) 37.5-25 mg tablet         ROS:  A 14 point review of systems was done and is negative other than as stated in HPI    Vitals:  Vitals:    04/15/24 1034   BP: 110/70   Pulse: 56   Weight: 115 kg (254 lb)   Height: 1.829 m (6')       Physical Exam:   Physical Exam  Cardiovascular:      Rate and Rhythm: Normal rate and regular rhythm.   Pulmonary:      Effort: Pulmonary effort is normal.   Musculoskeletal:         General: No swelling.   Skin:     Findings: No rash.   Neurological:      General: No focal deficit present.      Mental Status: She is alert and oriented to person, place, and time.   Psychiatric:         Mood and Affect: Mood normal.          Intake/Output:   No intake/output data recorded.    Outpatient Medications  Current Outpatient Medications on File Prior to Visit   Medication Sig Dispense Refill    albuterol 90 mcg/actuation inhaler Inhale 2 puffs.      cholecalciferol (Vitamin D-3) 125 MCG (5000 UT) capsule Take 1 capsule (125 mcg) by mouth once daily.      escitalopram (Lexapro) 20 mg tablet Take 1 tablet (20 mg) by mouth.      fluticasone (Flonase) 50 mcg/actuation nasal spray Administer 2 sprays into affected nostril(s) once daily.      gabapentin (Neurontin) 100 mg capsule Take 1 capsule (100 mg) by mouth.      magnesium oxide 400 mg magnesium capsule Take 2 capsules (800 mg) by mouth.      metFORMIN   mg 24 hr tablet Take 1 tablet (500 mg) by mouth.      multivitamin tablet Take by mouth.      traZODone (Desyrel) 150 mg tablet 1 tablet (150 mg).      triamterene-hydrochlorothiazid (Maxzide-25) 37.5-25 mg tablet       [DISCONTINUED] apixaban (Eliquis) 5 mg tablet Take 1 tablet (5 mg) by mouth every 12 hours. 60 tablet 0    [DISCONTINUED] metoprolol succinate XL (Toprol-XL) 50 mg 24 hr tablet Take 1 tablet (50 mg) by mouth once daily. Do not crush or chew. Do not start before March 17, 2024. 30 tablet 0     No current facility-administered medications on file prior to visit.       Labs: (past 26 weeks)  Recent Results (from the past 4368 hour(s))   ECG 12 lead    Collection Time: 12/24/23 11:39 PM   Result Value Ref Range    Ventricular Rate 111 BPM    Atrial Rate 112 BPM    OK Interval 128 ms    QRS Duration 95 ms    QT Interval 358 ms    QTC Calculation(Bazett) 487 ms    R Axis -3 degrees    T Axis -3 degrees    QRS Count 17 beats    Q Onset 252 ms    T Offset 431 ms    QTC Fredericia 439 ms   Phosphorus    Collection Time: 12/24/23 11:57 PM   Result Value Ref Range    Phosphorus 3.4 2.5 - 4.9 mg/dL   Magnesium    Collection Time: 12/24/23 11:57 PM   Result Value Ref Range    Magnesium 1.78 1.60 - 2.40 mg/dL   Comprehensive metabolic panel    Collection Time: 12/24/23 11:57 PM   Result Value Ref Range    Glucose 120 (H) 74 - 99 mg/dL    Sodium 138 136 - 145 mmol/L    Potassium 3.2 (L) 3.5 - 5.3 mmol/L    Chloride 103 98 - 107 mmol/L    Bicarbonate 26 21 - 32 mmol/L    Anion Gap 12 10 - 20 mmol/L    Urea Nitrogen 14 6 - 23 mg/dL    Creatinine 1.04 0.50 - 1.05 mg/dL    eGFR 65 >60 mL/min/1.73m*2    Calcium 9.0 8.6 - 10.3 mg/dL    Albumin 4.0 3.4 - 5.0 g/dL    Alkaline Phosphatase 50 33 - 110 U/L    Total Protein 6.8 6.4 - 8.2 g/dL    AST 19 9 - 39 U/L    Bilirubin, Total 0.3 0.0 - 1.2 mg/dL    ALT 18 7 - 45 U/L   Troponin I, High Sensitivity    Collection Time: 12/24/23 11:57 PM   Result Value Ref Range    Troponin  I, High Sensitivity 7 0 - 13 ng/L   B-Type Natriuretic Peptide    Collection Time: 12/24/23 11:57 PM   Result Value Ref Range    BNP 16 0 - 99 pg/mL   TSH with reflex to Free T4 if abnormal    Collection Time: 12/24/23 11:57 PM   Result Value Ref Range    Thyroid Stimulating Hormone 2.85 0.44 - 3.98 mIU/L   hCG, quantitative, pregnancy    Collection Time: 12/24/23 11:57 PM   Result Value Ref Range    HCG, Beta-Quantitative 4 <5 mIU/mL   ECG 12 lead    Collection Time: 12/25/23 12:20 AM   Result Value Ref Range    Ventricular Rate 66 BPM    Atrial Rate 66 BPM    AK Interval 205 ms    QRS Duration 94 ms    QT Interval 416 ms    QTC Calculation(Bazett) 436 ms    P Axis 8 degrees    R Axis -10 degrees    T Axis 2 degrees    QRS Count 11 beats    Q Onset 249 ms    T Offset 457 ms    QTC Fredericia 429 ms   CBC and Auto Differential    Collection Time: 12/25/23 12:35 AM   Result Value Ref Range    WBC 9.3 4.4 - 11.3 x10*3/uL    nRBC 0.0 0.0 - 0.0 /100 WBCs    RBC 4.70 4.00 - 5.20 x10*6/uL    Hemoglobin 14.7 12.0 - 16.0 g/dL    Hematocrit 41.2 36.0 - 46.0 %    MCV 88 80 - 100 fL    MCH 31.3 26.0 - 34.0 pg    MCHC 35.7 32.0 - 36.0 g/dL    RDW 11.9 11.5 - 14.5 %    Platelets 316 150 - 450 x10*3/uL    Neutrophils % 44.2 40.0 - 80.0 %    Immature Granulocytes %, Automated 0.2 0.0 - 0.9 %    Lymphocytes % 45.5 13.0 - 44.0 %    Monocytes % 7.3 2.0 - 10.0 %    Eosinophils % 1.9 0.0 - 6.0 %    Basophils % 0.9 0.0 - 2.0 %    Neutrophils Absolute 4.09 1.20 - 7.70 x10*3/uL    Immature Granulocytes Absolute, Automated 0.02 0.00 - 0.70 x10*3/uL    Lymphocytes Absolute 4.22 1.20 - 4.80 x10*3/uL    Monocytes Absolute 0.68 0.10 - 1.00 x10*3/uL    Eosinophils Absolute 0.18 0.00 - 0.70 x10*3/uL    Basophils Absolute 0.08 0.00 - 0.10 x10*3/uL   HEMOGLOBIN A1C    Collection Time: 02/02/24  9:47 AM   Result Value Ref Range    Hemoglobin A1C 6.4 (H) 4.0 - 5.6 %   Transthoracic echo (TTE) complete    Collection Time: 02/27/24  8:44 AM   Result  Value Ref Range    LVOT diam 1.83 cm    LV EF 70 %    MV E/A ratio 1.15     Tricuspid annular plane systolic excursion 2.8 cm    LA vol index A/L 19.0 ml/m2    RV free wall pk S' 15.30 cm/s    RVSP 26.7 mmHg    LVIDd 4.47 cm    LV A4C EF 73.9    ECG 12 lead    Collection Time: 03/15/24 10:35 PM   Result Value Ref Range    Ventricular Rate 117 BPM    Atrial Rate 149 BPM    FL Interval 174 ms    QRS Duration 90 ms    QT Interval 361 ms    QTC Calculation(Bazett) 504 ms    P Axis -7 degrees    R Axis -5 degrees    T Axis 38 degrees    QRS Count 18 beats    Q Onset 254 ms    T Offset 434 ms    QTC Fredericia 451 ms   CBC with Differential    Collection Time: 03/15/24 10:47 PM   Result Value Ref Range    WBC 9.1 4.4 - 11.3 x10*3/uL    nRBC 0.0 0.0 - 0.0 /100 WBCs    RBC 4.78 4.00 - 5.20 x10*6/uL    Hemoglobin 14.8 12.0 - 16.0 g/dL    Hematocrit 41.6 36.0 - 46.0 %    MCV 87 80 - 100 fL    MCH 32.3 26.0 - 34.0 pg    MCHC 36.9 (H) 32.0 - 36.0 g/dL    RDW 11.9 11.5 - 14.5 %    Platelets 309 150 - 450 x10*3/uL    Neutrophils % 53.2 40.0 - 80.0 %    Immature Granulocytes %, Automated 0.3 0.0 - 0.9 %    Lymphocytes % 36.9 13.0 - 44.0 %    Monocytes % 6.5 2.0 - 10.0 %    Eosinophils % 2.3 0.0 - 6.0 %    Basophils % 0.8 0.0 - 2.0 %    Neutrophils Absolute 4.86 1.20 - 7.70 x10*3/uL    Immature Granulocytes Absolute, Automated 0.03 0.00 - 0.70 x10*3/uL    Lymphocytes Absolute 3.37 1.20 - 4.80 x10*3/uL    Monocytes Absolute 0.59 0.10 - 1.00 x10*3/uL    Eosinophils Absolute 0.21 0.00 - 0.70 x10*3/uL    Basophils Absolute 0.07 0.00 - 0.10 x10*3/uL   Comprehensive Metabolic Panel    Collection Time: 03/15/24 10:47 PM   Result Value Ref Range    Glucose 159 (H) 74 - 99 mg/dL    Sodium 137 136 - 145 mmol/L    Potassium 3.5 3.5 - 5.3 mmol/L    Chloride 102 98 - 107 mmol/L    Bicarbonate 25 21 - 32 mmol/L    Anion Gap 14 10 - 20 mmol/L    Urea Nitrogen 22 6 - 23 mg/dL    Creatinine 0.89 0.50 - 1.05 mg/dL    eGFR 78 >60 mL/min/1.73m*2     Calcium 8.9 8.6 - 10.3 mg/dL    Albumin 4.3 3.4 - 5.0 g/dL    Alkaline Phosphatase 64 33 - 110 U/L    Total Protein 7.0 6.4 - 8.2 g/dL    AST 25 9 - 39 U/L    Bilirubin, Total 0.2 0.0 - 1.2 mg/dL    ALT 18 7 - 45 U/L   Brain Natriuretic Peptide    Collection Time: 03/15/24 10:47 PM   Result Value Ref Range    BNP 16 0 - 99 pg/mL   Troponin I, High Sensitivity    Collection Time: 03/15/24 10:47 PM   Result Value Ref Range    Troponin I, High Sensitivity 10 0 - 13 ng/L   Troponin I, High Sensitivity    Collection Time: 03/15/24 11:55 PM   Result Value Ref Range    Troponin I, High Sensitivity 10 0 - 13 ng/L   Magnesium    Collection Time: 03/15/24 11:55 PM   Result Value Ref Range    Magnesium 2.27 1.60 - 2.40 mg/dL   TSH with reflex to Free T4 if abnormal    Collection Time: 03/16/24  4:14 AM   Result Value Ref Range    Thyroid Stimulating Hormone 2.29 0.44 - 3.98 mIU/L   Lipid Panel    Collection Time: 03/16/24  4:14 AM   Result Value Ref Range    Cholesterol 150 0 - 199 mg/dL    HDL-Cholesterol 26.8 mg/dL    Cholesterol/HDL Ratio 5.6     LDL Calculated      VLDL      Triglycerides 492 (H) 0 - 149 mg/dL    Non HDL Cholesterol 123 0 - 149 mg/dL   CBC    Collection Time: 03/16/24  4:14 AM   Result Value Ref Range    WBC 6.6 4.4 - 11.3 x10*3/uL    nRBC 0.0 0.0 - 0.0 /100 WBCs    RBC 4.78 4.00 - 5.20 x10*6/uL    Hemoglobin 14.9 12.0 - 16.0 g/dL    Hematocrit 42.9 36.0 - 46.0 %    MCV 90 80 - 100 fL    MCH 31.2 26.0 - 34.0 pg    MCHC 34.7 32.0 - 36.0 g/dL    RDW 12.1 11.5 - 14.5 %    Platelets 319 150 - 450 x10*3/uL   Renal Function Panel    Collection Time: 03/16/24  4:14 AM   Result Value Ref Range    Glucose 120 (H) 74 - 99 mg/dL    Sodium 140 136 - 145 mmol/L    Potassium 2.7 (LL) 3.5 - 5.3 mmol/L    Chloride 106 98 - 107 mmol/L    Bicarbonate 26 21 - 32 mmol/L    Anion Gap 11 10 - 20 mmol/L    Urea Nitrogen 20 6 - 23 mg/dL    Creatinine 0.89 0.50 - 1.05 mg/dL    eGFR 78 >60 mL/min/1.73m*2    Calcium 7.3 (L) 8.6 -  10.3 mg/dL    Phosphorus 2.7 2.5 - 4.9 mg/dL    Albumin 3.5 3.4 - 5.0 g/dL   Magnesium    Collection Time: 03/16/24  4:14 AM   Result Value Ref Range    Magnesium 1.87 1.60 - 2.40 mg/dL   POCT GLUCOSE    Collection Time: 03/16/24 11:19 AM   Result Value Ref Range    POCT Glucose 173 (H) 74 - 99 mg/dL   Basic Metabolic Panel    Collection Time: 03/16/24  4:00 PM   Result Value Ref Range    Glucose 132 (H) 74 - 99 mg/dL    Sodium 137 136 - 145 mmol/L    Potassium 4.0 3.5 - 5.3 mmol/L    Chloride 104 98 - 107 mmol/L    Bicarbonate 26 21 - 32 mmol/L    Anion Gap 11 10 - 20 mmol/L    Urea Nitrogen 16 6 - 23 mg/dL    Creatinine 0.92 0.50 - 1.05 mg/dL    eGFR 75 >60 mL/min/1.73m*2    Calcium 8.7 8.6 - 10.3 mg/dL   POCT GLUCOSE    Collection Time: 03/16/24  4:14 PM   Result Value Ref Range    POCT Glucose 109 (H) 74 - 99 mg/dL       ECG  Encounter Date: 03/15/24   ECG 12 lead   Result Value    Ventricular Rate 117    Atrial Rate 149    SC Interval 174    QRS Duration 90    QT Interval 361    QTC Calculation(Bazett) 504    P Axis -7    R Axis -5    T Axis 38    QRS Count 18    Q Onset 254    T Offset 434    QTC Fredericia 451    Narrative    Atrial fibrillation  Ventricular premature complex  Consider anterior infarct  ST elevation, consider inferior injury    See ED provider note for full interpretation and clinical correlation  Confirmed by Madhavi Whitney ([id number]) on 3/19/2024 12:58:24 PM       Echocardiogram  No results found for this or any previous visit from the past 1095 days.      Assessment/Plan:     Relatively young patient with 2 episodes of AF which have resolved spontaneously. Both episodes correlated with at least some ETOH use. She admits to snoring, never had a sleep study. Her episodes although paroxysmal, have lead to ER visits. I think its best to treat these with flecainide in the short term at least until she works on lifestyle modifications including alcohol cessation and possible CHANDLER treatment. Will follow  in 6 months. If any breakthroughs or medication intolerances, we can consider PVI as alternative option.

## 2024-04-18 ENCOUNTER — PATIENT OUTREACH (OUTPATIENT)
Dept: CARE COORDINATION | Facility: CLINIC | Age: 54
End: 2024-04-18
Payer: COMMERCIAL

## 2024-04-28 ENCOUNTER — CLINICAL SUPPORT (OUTPATIENT)
Dept: SLEEP MEDICINE | Facility: HOSPITAL | Age: 54
End: 2024-04-28
Payer: COMMERCIAL

## 2024-04-28 DIAGNOSIS — I48.0 PAROXYSMAL ATRIAL FIBRILLATION (MULTI): ICD-10-CM

## 2024-04-28 PROCEDURE — 95806 SLEEP STUDY UNATT&RESP EFFT: CPT | Performed by: INTERNAL MEDICINE

## 2024-05-06 DIAGNOSIS — F41.9 ANXIETY AND DEPRESSION: ICD-10-CM

## 2024-05-06 DIAGNOSIS — I10 ESSENTIAL HYPERTENSION: ICD-10-CM

## 2024-05-06 DIAGNOSIS — Z76.0 ENCOUNTER FOR MEDICATION REFILL: ICD-10-CM

## 2024-05-06 DIAGNOSIS — E11.9 TYPE 2 DIABETES MELLITUS WITHOUT COMPLICATION, WITHOUT LONG-TERM CURRENT USE OF INSULIN (HCC): ICD-10-CM

## 2024-05-06 DIAGNOSIS — F32.A ANXIETY AND DEPRESSION: ICD-10-CM

## 2024-05-06 DIAGNOSIS — R20.2 PARESTHESIA: ICD-10-CM

## 2024-05-07 PROCEDURE — RXMED WILLOW AMBULATORY MEDICATION CHARGE

## 2024-05-07 RX ORDER — TRAZODONE HYDROCHLORIDE 150 MG/1
150 TABLET ORAL NIGHTLY
Qty: 69 TABLET | Refills: 0 | Status: SHIPPED | OUTPATIENT
Start: 2024-05-07 | End: 2024-07-15

## 2024-05-07 RX ORDER — TRIAMTERENE AND HYDROCHLOROTHIAZIDE 37.5; 25 MG/1; MG/1
1 TABLET ORAL DAILY
Qty: 69 TABLET | Refills: 0 | Status: SHIPPED | OUTPATIENT
Start: 2024-05-07 | End: 2024-07-15

## 2024-05-07 RX ORDER — ESCITALOPRAM OXALATE 20 MG/1
TABLET ORAL
Qty: 135 TABLET | Refills: 0 | Status: SHIPPED | OUTPATIENT
Start: 2024-05-07 | End: 2024-07-15

## 2024-05-07 RX ORDER — GABAPENTIN 100 MG/1
200 CAPSULE ORAL NIGHTLY
Qty: 180 CAPSULE | Refills: 0 | Status: SHIPPED | OUTPATIENT
Start: 2024-05-07 | End: 2025-05-08

## 2024-05-07 RX ORDER — METFORMIN HYDROCHLORIDE 500 MG/1
500 TABLET, EXTENDED RELEASE ORAL
Qty: 90 TABLET | Refills: 0 | Status: SHIPPED | OUTPATIENT
Start: 2024-05-07 | End: 2024-07-15

## 2024-05-10 ENCOUNTER — PHARMACY VISIT (OUTPATIENT)
Dept: PHARMACY | Facility: CLINIC | Age: 54
End: 2024-05-10
Payer: COMMERCIAL

## 2024-06-28 DIAGNOSIS — F32.A ANXIETY AND DEPRESSION: ICD-10-CM

## 2024-06-28 DIAGNOSIS — Z76.0 ENCOUNTER FOR MEDICATION REFILL: ICD-10-CM

## 2024-06-28 DIAGNOSIS — I10 ESSENTIAL HYPERTENSION: ICD-10-CM

## 2024-06-28 DIAGNOSIS — F41.9 ANXIETY AND DEPRESSION: ICD-10-CM

## 2024-06-28 RX ORDER — TRIAMTERENE/HYDROCHLOROTHIAZID 37.5-25 MG
1 TABLET ORAL DAILY
Qty: 90 TABLET | Refills: 0 | OUTPATIENT
Start: 2024-06-28

## 2024-06-28 RX ORDER — ESCITALOPRAM OXALATE 20 MG/1
TABLET ORAL
Qty: 135 TABLET | Refills: 0 | OUTPATIENT
Start: 2024-06-28

## 2024-07-01 DIAGNOSIS — E78.2 MIXED HYPERLIPIDEMIA: ICD-10-CM

## 2024-07-01 DIAGNOSIS — E11.9 TYPE 2 DIABETES MELLITUS WITHOUT COMPLICATION, WITHOUT LONG-TERM CURRENT USE OF INSULIN (HCC): ICD-10-CM

## 2024-07-01 LAB
ALBUMIN: 4.2 G/DL (ref 3.5–5.2)
ALP BLD-CCNC: 58 U/L (ref 35–104)
ALT SERPL-CCNC: 14 U/L (ref 0–32)
ANION GAP SERPL CALCULATED.3IONS-SCNC: 13 MMOL/L (ref 7–16)
AST SERPL-CCNC: 21 U/L (ref 0–31)
BILIRUB SERPL-MCNC: 0.2 MG/DL (ref 0–1.2)
BUN BLDV-MCNC: 12 MG/DL (ref 6–20)
CALCIUM SERPL-MCNC: 9.4 MG/DL (ref 8.6–10.2)
CHLORIDE BLD-SCNC: 102 MMOL/L (ref 98–107)
CHOLESTEROL, TOTAL: 229 MG/DL
CO2: 26 MMOL/L (ref 22–29)
CREAT SERPL-MCNC: 1 MG/DL (ref 0.5–1)
GFR, ESTIMATED: 69 ML/MIN/1.73M2
GLUCOSE BLD-MCNC: 119 MG/DL (ref 74–99)
HBA1C MFR BLD: 6.5 % (ref 4–5.6)
HDLC SERPL-MCNC: 48 MG/DL
LDL CHOLESTEROL: 140 MG/DL
POTASSIUM SERPL-SCNC: 4.9 MMOL/L (ref 3.5–5)
SODIUM BLD-SCNC: 141 MMOL/L (ref 132–146)
TOTAL PROTEIN: 7.5 G/DL (ref 6.4–8.3)
TRIGL SERPL-MCNC: 204 MG/DL
VLDLC SERPL CALC-MCNC: 41 MG/DL

## 2024-07-01 SDOH — ECONOMIC STABILITY: FOOD INSECURITY: WITHIN THE PAST 12 MONTHS, YOU WORRIED THAT YOUR FOOD WOULD RUN OUT BEFORE YOU GOT MONEY TO BUY MORE.: NEVER TRUE

## 2024-07-01 SDOH — ECONOMIC STABILITY: INCOME INSECURITY: HOW HARD IS IT FOR YOU TO PAY FOR THE VERY BASICS LIKE FOOD, HOUSING, MEDICAL CARE, AND HEATING?: NOT HARD AT ALL

## 2024-07-01 SDOH — ECONOMIC STABILITY: TRANSPORTATION INSECURITY
IN THE PAST 12 MONTHS, HAS LACK OF TRANSPORTATION KEPT YOU FROM MEETINGS, WORK, OR FROM GETTING THINGS NEEDED FOR DAILY LIVING?: NO

## 2024-07-01 SDOH — ECONOMIC STABILITY: FOOD INSECURITY: WITHIN THE PAST 12 MONTHS, THE FOOD YOU BOUGHT JUST DIDN'T LAST AND YOU DIDN'T HAVE MONEY TO GET MORE.: NEVER TRUE

## 2024-07-02 ENCOUNTER — OFFICE VISIT (OUTPATIENT)
Dept: FAMILY MEDICINE CLINIC | Age: 54
End: 2024-07-02
Payer: COMMERCIAL

## 2024-07-02 VITALS
WEIGHT: 258 LBS | BODY MASS INDEX: 34.95 KG/M2 | DIASTOLIC BLOOD PRESSURE: 82 MMHG | TEMPERATURE: 96.8 F | OXYGEN SATURATION: 96 % | RESPIRATION RATE: 16 BRPM | SYSTOLIC BLOOD PRESSURE: 120 MMHG | HEART RATE: 74 BPM | HEIGHT: 72 IN

## 2024-07-02 DIAGNOSIS — I10 ESSENTIAL HYPERTENSION: ICD-10-CM

## 2024-07-02 DIAGNOSIS — E11.9 TYPE 2 DIABETES MELLITUS WITHOUT COMPLICATION, WITHOUT LONG-TERM CURRENT USE OF INSULIN (HCC): ICD-10-CM

## 2024-07-02 DIAGNOSIS — G43.919 INTRACTABLE MIGRAINE WITHOUT STATUS MIGRAINOSUS, UNSPECIFIED MIGRAINE TYPE: ICD-10-CM

## 2024-07-02 DIAGNOSIS — J30.81 ALLERGIC RHINITIS DUE TO ANIMAL HAIR AND DANDER: ICD-10-CM

## 2024-07-02 DIAGNOSIS — F32.A ANXIETY AND DEPRESSION: ICD-10-CM

## 2024-07-02 DIAGNOSIS — F41.9 ANXIETY AND DEPRESSION: ICD-10-CM

## 2024-07-02 DIAGNOSIS — R20.2 PARESTHESIA: ICD-10-CM

## 2024-07-02 DIAGNOSIS — Z76.0 ENCOUNTER FOR MEDICATION REFILL: Primary | ICD-10-CM

## 2024-07-02 PROCEDURE — 3079F DIAST BP 80-89 MM HG: CPT | Performed by: FAMILY MEDICINE

## 2024-07-02 PROCEDURE — G2211 COMPLEX E/M VISIT ADD ON: HCPCS | Performed by: FAMILY MEDICINE

## 2024-07-02 PROCEDURE — 99215 OFFICE O/P EST HI 40 MIN: CPT | Performed by: FAMILY MEDICINE

## 2024-07-02 PROCEDURE — RXMED WILLOW AMBULATORY MEDICATION CHARGE

## 2024-07-02 PROCEDURE — 3044F HG A1C LEVEL LT 7.0%: CPT | Performed by: FAMILY MEDICINE

## 2024-07-02 PROCEDURE — 3074F SYST BP LT 130 MM HG: CPT | Performed by: FAMILY MEDICINE

## 2024-07-02 RX ORDER — ESCITALOPRAM OXALATE 20 MG/1
TABLET ORAL
Qty: 135 TABLET | Refills: 3 | Status: SHIPPED | OUTPATIENT
Start: 2024-07-02 | End: 2025-07-02

## 2024-07-02 RX ORDER — TRIAMTERENE AND HYDROCHLOROTHIAZIDE 37.5; 25 MG/1; MG/1
1 TABLET ORAL DAILY
Qty: 90 TABLET | Refills: 3 | Status: SHIPPED | OUTPATIENT
Start: 2024-07-02 | End: 2025-07-02

## 2024-07-02 RX ORDER — TRAZODONE HYDROCHLORIDE 150 MG/1
150 TABLET ORAL NIGHTLY
Qty: 90 TABLET | Refills: 3 | Status: SHIPPED | OUTPATIENT
Start: 2024-07-02 | End: 2025-07-02

## 2024-07-02 RX ORDER — METOPROLOL SUCCINATE 25 MG/1
25 TABLET, EXTENDED RELEASE ORAL DAILY
Qty: 90 TABLET | Refills: 3 | Status: CANCELLED | OUTPATIENT
Start: 2024-07-02 | End: 2025-07-03

## 2024-07-02 RX ORDER — GABAPENTIN 100 MG/1
200 CAPSULE ORAL NIGHTLY
Qty: 180 CAPSULE | Refills: 3 | Status: SHIPPED | OUTPATIENT
Start: 2024-07-02 | End: 2025-07-02

## 2024-07-02 RX ORDER — METFORMIN HYDROCHLORIDE 500 MG/1
TABLET, EXTENDED RELEASE ORAL
Qty: 360 TABLET | Refills: 3 | Status: SHIPPED | OUTPATIENT
Start: 2024-07-02 | End: 2025-07-02

## 2024-07-02 RX ORDER — FLECAINIDE ACETATE 50 MG/1
50 TABLET ORAL 2 TIMES DAILY
COMMUNITY
Start: 2024-04-15 | End: 2025-04-15

## 2024-07-02 RX ORDER — FLUTICASONE PROPIONATE 50 MCG
2 SPRAY, SUSPENSION (ML) NASAL DAILY
Qty: 3 EACH | Refills: 3 | Status: SHIPPED | OUTPATIENT
Start: 2024-07-02 | End: 2025-07-02

## 2024-07-02 RX ORDER — RIZATRIPTAN BENZOATE 5 MG/1
5 TABLET, ORALLY DISINTEGRATING ORAL
Qty: 12 TABLET | Refills: 3 | Status: CANCELLED | OUTPATIENT
Start: 2024-07-02 | End: 2024-07-02

## 2024-07-02 ASSESSMENT — ENCOUNTER SYMPTOMS
CONSTIPATION: 1
COUGH: 0
BLOOD IN STOOL: 0
BACK PAIN: 0
DIARRHEA: 0
ABDOMINAL PAIN: 0
NAUSEA: 0
SHORTNESS OF BREATH: 0
SORE THROAT: 0
WHEEZING: 0
VOMITING: 0

## 2024-07-02 NOTE — PROGRESS NOTES
Tierney Bermudez is a 54 y.o. female who presents today for     Chief Complaint   Patient presents with    Diabetes    Discuss Labs    Medication Refill       ASSESSMENT/PLAN, 2/7/24 VISIT:       Type 2 diabetes mellitus without complication, without long-term current use of insulin (HCC): Worsening of HgbA1C despite diet and medication compliance.  She did admit to alcohol use as above, which is likely interfering with DM control.        -     Continue metFORMIN (GLUCOPHAGE-XR) 500 MG extended release tablet; Take 1 tablet by mouth daily (with breakfast)         -     SMART goal to decrease alcohol as documented  -     Comprehensive Metabolic Panel; Future  -     Hemoglobin A1C; Future  -     Lipid Panel; Future    Mixed hyperlipidemia: Worse than previous, despite reports of lifestyle compliance.  She now has PAF.  No previous history of statin prescription.  Alcohol use documented as above        -     Patient was counseled that her alcohol use is very likely contributing to elevated lipids as well as elevated HgbA1C        -     SMART goal regarding D/C alcohol use as documented        -     Patient has an appointment with Cardiology @ [facility] next week.  She was advised to discuss indications of statin therapy with him at this time, as this is the standard of care  -     Comprehensive Metabolic Panel; Future  -     Lipid Panel; Future    Uncomplicated alcohol use: SMART goal to decrease/eliminate alcohol documented, as alcohol is felt to contribute to suboptimal control of DM and lipids      SMART GOAL TO DECREASE ALCOHOL CONSUMPTION:    WHY: TO IMPROVE HGBA1C, TO LOSE WEIGHT, AND GET/STAY HEALTHY    HOW: IN ORDER TO IMPROVE LAB NUMBERS, AVOID ALCOHOL. SUBSTITUTE DIET SODA OR SPARKLING WATER IN PLACE OF ALCOHOL.    START WEDNESDAY, FEBRUARY 7, 2024.    FOLLOW UP WITH DR. CALDWELL IN THREE (3) MONTHS WITH LAB WORK 1 WEEK PRIOR     On this date 02/07/24  I have spent 45 minutes reviewing previous

## 2024-07-03 PROCEDURE — RXMED WILLOW AMBULATORY MEDICATION CHARGE

## 2024-07-05 ENCOUNTER — PHARMACY VISIT (OUTPATIENT)
Dept: PHARMACY | Facility: CLINIC | Age: 54
End: 2024-07-05
Payer: COMMERCIAL

## 2024-07-30 ENCOUNTER — PHARMACY VISIT (OUTPATIENT)
Dept: PHARMACY | Facility: CLINIC | Age: 54
End: 2024-07-30
Payer: COMMERCIAL

## 2024-07-30 PROCEDURE — RXMED WILLOW AMBULATORY MEDICATION CHARGE

## 2024-09-11 ENCOUNTER — LAB (OUTPATIENT)
Dept: LAB | Facility: LAB | Age: 54
End: 2024-09-11
Payer: COMMERCIAL

## 2024-09-11 LAB — COTININE UR QL SCN: NEGATIVE

## 2024-10-02 PROCEDURE — RXMED WILLOW AMBULATORY MEDICATION CHARGE

## 2024-10-04 ENCOUNTER — PHARMACY VISIT (OUTPATIENT)
Dept: PHARMACY | Facility: CLINIC | Age: 54
End: 2024-10-04
Payer: COMMERCIAL

## 2024-10-08 DIAGNOSIS — E11.9 TYPE 2 DIABETES MELLITUS WITHOUT COMPLICATION, WITHOUT LONG-TERM CURRENT USE OF INSULIN (HCC): ICD-10-CM

## 2024-10-08 LAB
ALBUMIN: 4.3 G/DL (ref 3.5–5.2)
ALP BLD-CCNC: 50 U/L (ref 35–104)
ALT SERPL-CCNC: 19 U/L (ref 0–32)
ANION GAP SERPL CALCULATED.3IONS-SCNC: 18 MMOL/L (ref 7–16)
AST SERPL-CCNC: 28 U/L (ref 0–31)
BILIRUB SERPL-MCNC: 0.3 MG/DL (ref 0–1.2)
BUN BLDV-MCNC: 16 MG/DL (ref 6–20)
CALCIUM SERPL-MCNC: 9.5 MG/DL (ref 8.6–10.2)
CHLORIDE BLD-SCNC: 101 MMOL/L (ref 98–107)
CHOLESTEROL, TOTAL: 224 MG/DL
CO2: 22 MMOL/L (ref 22–29)
CREAT SERPL-MCNC: 1.1 MG/DL (ref 0.5–1)
GFR, ESTIMATED: 61 ML/MIN/1.73M2
GLUCOSE BLD-MCNC: 114 MG/DL (ref 74–99)
HBA1C MFR BLD: 5.9 % (ref 4–5.6)
HDLC SERPL-MCNC: 47 MG/DL
LDL CHOLESTEROL: 131 MG/DL
POTASSIUM SERPL-SCNC: 4.6 MMOL/L (ref 3.5–5)
SODIUM BLD-SCNC: 141 MMOL/L (ref 132–146)
TOTAL PROTEIN: 7.2 G/DL (ref 6.4–8.3)
TRIGL SERPL-MCNC: 228 MG/DL
VLDLC SERPL CALC-MCNC: 46 MG/DL

## 2024-10-09 ENCOUNTER — OFFICE VISIT (OUTPATIENT)
Dept: FAMILY MEDICINE CLINIC | Age: 54
End: 2024-10-09
Payer: COMMERCIAL

## 2024-10-09 VITALS
BODY MASS INDEX: 34 KG/M2 | RESPIRATION RATE: 16 BRPM | HEIGHT: 72 IN | SYSTOLIC BLOOD PRESSURE: 110 MMHG | HEART RATE: 53 BPM | TEMPERATURE: 97.2 F | DIASTOLIC BLOOD PRESSURE: 72 MMHG | OXYGEN SATURATION: 95 % | WEIGHT: 251 LBS

## 2024-10-09 DIAGNOSIS — I10 ESSENTIAL HYPERTENSION: ICD-10-CM

## 2024-10-09 DIAGNOSIS — Z76.0 ENCOUNTER FOR MEDICATION REFILL: Primary | ICD-10-CM

## 2024-10-09 DIAGNOSIS — E78.2 MIXED HYPERLIPIDEMIA: ICD-10-CM

## 2024-10-09 DIAGNOSIS — E11.9 TYPE 2 DIABETES MELLITUS WITHOUT COMPLICATION, WITHOUT LONG-TERM CURRENT USE OF INSULIN (HCC): ICD-10-CM

## 2024-10-09 DIAGNOSIS — E53.8 LOW SERUM VITAMIN B12: ICD-10-CM

## 2024-10-09 DIAGNOSIS — E55.9 VITAMIN D INSUFFICIENCY: ICD-10-CM

## 2024-10-09 DIAGNOSIS — M79.10 MYALGIA DUE TO STATIN: ICD-10-CM

## 2024-10-09 DIAGNOSIS — T46.6X5A MYALGIA DUE TO STATIN: ICD-10-CM

## 2024-10-09 DIAGNOSIS — Z12.31 ENCOUNTER FOR SCREENING MAMMOGRAM FOR MALIGNANT NEOPLASM OF BREAST: ICD-10-CM

## 2024-10-09 DIAGNOSIS — R79.0 LOW MAGNESIUM LEVEL: ICD-10-CM

## 2024-10-09 PROCEDURE — 3044F HG A1C LEVEL LT 7.0%: CPT | Performed by: FAMILY MEDICINE

## 2024-10-09 PROCEDURE — RXMED WILLOW AMBULATORY MEDICATION CHARGE

## 2024-10-09 PROCEDURE — 99214 OFFICE O/P EST MOD 30 MIN: CPT | Performed by: FAMILY MEDICINE

## 2024-10-09 PROCEDURE — 3078F DIAST BP <80 MM HG: CPT | Performed by: FAMILY MEDICINE

## 2024-10-09 PROCEDURE — G2211 COMPLEX E/M VISIT ADD ON: HCPCS | Performed by: FAMILY MEDICINE

## 2024-10-09 PROCEDURE — 3074F SYST BP LT 130 MM HG: CPT | Performed by: FAMILY MEDICINE

## 2024-10-09 RX ORDER — METOPROLOL SUCCINATE 25 MG/1
25 TABLET, EXTENDED RELEASE ORAL DAILY
Qty: 90 TABLET | Refills: 3 | Status: SHIPPED | OUTPATIENT
Start: 2025-01-01 | End: 2026-01-01

## 2024-10-09 RX ORDER — GLUCOSA SU 2KCL/CHONDROITIN SU 500-400 MG
1 CAPSULE ORAL DAILY
COMMUNITY
Start: 2024-10-09

## 2024-10-09 RX ORDER — ATORVASTATIN CALCIUM 20 MG/1
20 TABLET, FILM COATED ORAL DAILY
Qty: 90 TABLET | Refills: 3 | Status: SHIPPED | OUTPATIENT
Start: 2024-10-09

## 2024-10-09 RX ORDER — ALBUTEROL SULFATE 90 UG/1
2 INHALANT RESPIRATORY (INHALATION)
Qty: 1 EACH | Refills: 11 | Status: SHIPPED | OUTPATIENT
Start: 2025-01-01 | End: 2026-01-01

## 2024-10-09 RX ORDER — METFORMIN HCL 500 MG
1000 TABLET, EXTENDED RELEASE 24 HR ORAL
Qty: 360 TABLET | Refills: 3 | Status: SHIPPED | OUTPATIENT
Start: 2025-01-01 | End: 2026-01-01

## 2024-10-09 ASSESSMENT — ENCOUNTER SYMPTOMS
SORE THROAT: 0
DIARRHEA: 0
CONSTIPATION: 1
VOMITING: 0
WHEEZING: 0
COUGH: 0
SHORTNESS OF BREATH: 0
NAUSEA: 0
ABDOMINAL PAIN: 0
BACK PAIN: 0
BLOOD IN STOOL: 0

## 2024-10-09 NOTE — PATIENT INSTRUCTIONS
INCREASING WATER:    1) DOCUMENT HOW MUCH WATER YOU ARE ACTUALLY DRINKING X 1 WEEK    2) TO YOUR CURRENT INTAKE, ADD EIGHT (8) OUNCES OF WATER ONCE A DAY, EVERY DAY FOR A WEEK.    3) EVERY WEEK, CONTINUE TO ADD EIGHT (8) OUNCES OF WATER PER DAY, PER WEEK UNTIL YOU REACH YOUR GOAL (MINIMUM 64 OZ/DAY)

## 2024-10-09 NOTE — PROGRESS NOTES
improved from last visit.         Labs:  Lab Results   Component Value Date/Time     10/08/2024 09:58 AM    K 4.6 10/08/2024 09:58 AM     10/08/2024 09:58 AM    CO2 22 10/08/2024 09:58 AM    BUN 16 10/08/2024 09:58 AM    CREATININE 1.1 10/08/2024 09:58 AM    CALCIUM 9.5 10/08/2024 09:58 AM    GFRAA >60 09/29/2022 09:26 AM    LABGLOM 61 10/08/2024 09:58 AM    LABGLOM >60 02/02/2024 09:47 AM    GLUCOSE 114 10/08/2024 09:58 AM    AST 28 10/08/2024 09:58 AM    ALT 19 10/08/2024 09:58 AM    ALKPHOS 50 10/08/2024 09:58 AM    BILITOT 0.3 10/08/2024 09:58 AM    TSH 0.730 06/01/2022 09:37 AM    VITD25 55.2 07/18/2023 10:06 AM    CHOL 224 10/08/2024 09:58 AM    TRIG 228 10/08/2024 09:58 AM    HDL 47 10/08/2024 09:58 AM    LABA1C 5.9 10/08/2024 09:58 AM      Lab Results   Component Value Date    CHOL 224 (H) 10/08/2024    CHOL 229 (H) 07/01/2024    CHOL 209 (H) 02/02/2024     Lab Results   Component Value Date    TRIG 228 (H) 10/08/2024    TRIG 204 (H) 07/01/2024    TRIG 192 (H) 02/02/2024     Lab Results   Component Value Date    HDL 47 10/08/2024    HDL 48 07/01/2024    HDL 43 02/02/2024     Lab Results   Component Value Date     (H) 07/01/2024     (H) 02/02/2024     (H) 07/18/2023           Lab Results   Component Value Date    LABA1C 5.9 (H) 10/08/2024    LABA1C 6.5 (H) 07/01/2024    LABA1C 6.4 (H) 02/02/2024     Lab Results   Component Value Date    CREATININE 1.1 (H) 10/08/2024         Assessment / Plan:      Tierney was seen today for 3 month follow-up, discuss labs, diabetes and flu vaccine.    Diagnoses and all orders for this visit:    Encounter for medication refill  -     albuterol sulfate HFA (VENTOLIN HFA) 108 (90 Base) MCG/ACT inhaler; Inhale 2 puffs into the lungs every 4-6 hours as needed for Wheezing or Shortness of Breath  -     metoprolol succinate (TOPROL XL) 25 MG extended release tablet; Take 1 tablet by mouth daily  -     metFORMIN (GLUCOPHAGE-XR) 500 MG extended

## 2024-10-21 ENCOUNTER — APPOINTMENT (OUTPATIENT)
Dept: CARDIOLOGY | Facility: CLINIC | Age: 54
End: 2024-10-21
Payer: COMMERCIAL

## 2024-10-21 ENCOUNTER — PHARMACY VISIT (OUTPATIENT)
Dept: PHARMACY | Facility: CLINIC | Age: 54
End: 2024-10-21
Payer: COMMERCIAL

## 2024-10-21 ENCOUNTER — OFFICE VISIT (OUTPATIENT)
Dept: CARDIOLOGY | Facility: HOSPITAL | Age: 54
End: 2024-10-21
Payer: COMMERCIAL

## 2024-10-21 VITALS
HEIGHT: 72 IN | DIASTOLIC BLOOD PRESSURE: 70 MMHG | BODY MASS INDEX: 33.86 KG/M2 | HEART RATE: 48 BPM | SYSTOLIC BLOOD PRESSURE: 108 MMHG | WEIGHT: 250 LBS

## 2024-10-21 DIAGNOSIS — G47.33 OSA (OBSTRUCTIVE SLEEP APNEA): ICD-10-CM

## 2024-10-21 DIAGNOSIS — I48.0 PAROXYSMAL ATRIAL FIBRILLATION (MULTI): Primary | ICD-10-CM

## 2024-10-21 DIAGNOSIS — Z79.899 ENCOUNTER FOR MONITORING FLECAINIDE THERAPY: ICD-10-CM

## 2024-10-21 DIAGNOSIS — Z79.01 CURRENT USE OF LONG TERM ANTICOAGULATION: ICD-10-CM

## 2024-10-21 DIAGNOSIS — Z51.81 ENCOUNTER FOR MONITORING FLECAINIDE THERAPY: ICD-10-CM

## 2024-10-21 PROCEDURE — 93005 ELECTROCARDIOGRAM TRACING: CPT | Performed by: NURSE PRACTITIONER

## 2024-10-21 PROCEDURE — 99214 OFFICE O/P EST MOD 30 MIN: CPT | Performed by: NURSE PRACTITIONER

## 2024-10-21 RX ORDER — METOPROLOL SUCCINATE 25 MG/1
12.5 TABLET, EXTENDED RELEASE ORAL DAILY
Qty: 45 TABLET | Refills: 3 | Status: SHIPPED | OUTPATIENT
Start: 2024-10-21 | End: 2025-10-21

## 2024-10-21 NOTE — PROGRESS NOTES
Electrophysiology Follow up Visit    History of Present Illness:  Maritza aGrcia is a 54 y.o. year old female patient with    HTN  DMII  Anxiety  Paroxysmal atrial fibrillation: Dec 2023, went to the ER. Converted after a few doses of IV metoprolol. Had a second episode on 3/2024. Came to the ER again with AF and converted spontaneously. Each episode lasted around 3-5 hours. Associated with palpitations, SOB and chest pain. Occasional palpitations at times, lasting seconds only.    Patient was seen in April 2024. Relatively young patient with 2 episodes of AF which have resolved spontaneously. Both episodes correlated with at least some ETOH use. Her episodes although paroxysmal, have lead to ER visits. She was started on flecainide. I think its best to treat these with flecainide in the short term at least until she works on lifestyle modifications including alcohol cessation and possible CHANDLER treatment. Will follow in 6 months. If any breakthroughs or medication intolerances, we can consider PVI as alternative option.     Patient here for follow up for atrial fibrillation and flecainide monitoring. She denies any further episodes of AF. She reports brief episodes of palpitations that last 1-2 seconds. She is compliant with daily medications and denies any bleeding concerns on OAC.      Medical History:  She has a past medical history of Atrial fibrillation (Multi) (12/24/2023), Diabetes mellitus (Multi) (July 2023), and Hypertension.    Surgical History:  She has no past surgical history on file.     Social History:  She reports that she has quit smoking. Her smoking use included cigarettes. She has a 2.5 pack-year smoking history. She has never used smokeless tobacco. She reports current alcohol use of about 5.0 standard drinks of alcohol per week. She reports that she does not use drugs.         Family History   Problem Relation Name Age of Onset    Hypertension Mother Libby     Abnormal EKG Father Romain      Arrhythmia Father Romain     Asthma Father Romain     Atrial fibrillation Father Romain     Diabetes type II Father Romain     Hypertension Father Romain     Cancer Maternal Grandfather Palomo     Lung disease Maternal Grandfather Palomo     Hypertension Maternal Grandmother Zhanna     Thyroid disease Maternal Grandmother Zhanna     Cancer Paternal Grandfather Lj     Diabetes type II Paternal Grandfather Lj     Hypertension Paternal Grandmother Kesha         ROS:  A 14 point review of systems was done and is negative other than as stated in HPI    Physical Exam  Constitutional:       Appearance: Normal appearance.   Cardiovascular:      Rate and Rhythm: Normal rate and regular rhythm.      Pulses: Normal pulses.      Heart sounds: Normal heart sounds.   Pulmonary:      Effort: Pulmonary effort is normal.      Breath sounds: Normal breath sounds.   Musculoskeletal:         General: Normal range of motion.      Right lower leg: No edema.      Left lower leg: No edema.   Skin:     General: Skin is warm and dry.   Neurological:      Mental Status: She is alert and oriented to person, place, and time.   Psychiatric:         Mood and Affect: Mood normal.       Allergies:  Lisinopril, Ace inhibitors, and Cat hair standardized allergenic extract    Outpatient Medications:  Current Outpatient Medications   Medication Instructions    albuterol 90 mcg/actuation inhaler 2 puffs, inhalation    albuterol 90 mcg/actuation inhaler Inhale 2 puffs into the lungs every 4-6 hours as needed for Wheezing or Shortness of Breath    atorvastatin (Lipitor) 20 mg tablet Take 1 tablet by mouth daily    Eliquis 5 mg, oral, Every 12 hours    escitalopram (LEXAPRO) 30 mg, oral, Daily    flecainide (TAMBOCOR) 50 mg, oral, 2 times daily    fluticasone (Flonase) 50 mcg/actuation nasal spray 2 sprays, nasal, Daily    fluticasone (Flonase) 50 mcg/actuation nasal spray use 2 sprays into the nostril(s) daily    gabapentin (Neurontin) 100 mg capsule Take 2  capsules by mouth nightly.    metFORMIN  mg 24 hr tablet Take 2 tablets by mouth 2 times daily (before meals)    metoprolol succinate XL (TOPROL-XL) 12.5 mg, oral, Daily, Do not crush or chew.    multivitamin tablet Take by mouth.    traZODone (Desyrel) 150 mg tablet Take 1 tablet by mouth nightly    triamterene-hydrochlorothiazid (Maxzide-25) 37.5-25 mg tablet Take 1 tablet by mouth daily No further refills without appointment    triamterene-hydrochlorothiazid (Maxzide-25) 37.5-25 mg tablet Take 1 tablet by mouth daily         Last Labs:  CBC  Lab Results   Component Value Date    WBC 6.6 03/16/2024    HGB 14.9 03/16/2024    HCT 42.9 03/16/2024    MCV 90 03/16/2024     03/16/2024        Renal Function Panel  Lab Results   Component Value Date    GLUCOSE 132 (H) 03/16/2024     03/16/2024    K 4.0 03/16/2024     03/16/2024    CO2 26 03/16/2024    ANIONGAP 11 03/16/2024    BUN 16 03/16/2024    CREATININE 0.92 03/16/2024    CALCIUM 8.7 03/16/2024        CMP  Lab Results   Component Value Date    CALCIUM 8.7 03/16/2024    PHOS 2.7 03/16/2024    PROT 7.0 03/15/2024    ALBUMIN 3.5 03/16/2024    AST 25 03/15/2024    ALT 18 03/15/2024    ALKPHOS 64 03/15/2024    BILITOT 0.2 03/15/2024        Mg   Lab Results   Component Value Date    MG 1.87 03/16/2024        Thyroid  Lab Results   Component Value Date    TSH 2.29 03/16/2024        Visit Vitals  /70   Pulse (!) 48   Ht 1.829 m (6')   Wt 113 kg (250 lb)   BMI 33.91 kg/m²   OB Status Postmenopausal   Smoking Status Former   BSA 2.4 m²           Cardiac Testing Reviewed Study(s):  Echo (February/2024):   CONCLUSIONS:   1. Left ventricular systolic function is normal with a 65-70% estimated ejection fraction.   2. Spectral Doppler shows an impaired relaxation pattern of left ventricular diastolic filling.   3. RVSP within normal limits.  ECGs (reviewed and my interpretation):   10/21/2024 sinus bradycardia with rate of 48 bpm with PA  174ms      Assessment  Atrial fibrillation: paroxysmal in nature. No recurrence since starting flecainide. Ecg today personally reviewed which shows sinus bradycardia with normal AZ and QRS. Labs reviewed and acceptable. We reviewed lifestyle modifications to reduce risk of recurrence of atrial fibrillation. Patient has reduce her alcohol use significantly. Her sleep study indicated possible severe sleep apnea. We will refer patient to sleep medicine for treatment. Her BP is well controlled and she exercises regularly. I encouraged patient to see sleep medicine as untreated sleep apnea can increase AF recurrence. Discussed continuing OAC due to elevated [id number] score of 2-3 (htn, dm, gender) which she is agreeable to. We will reduce metoprolol due to bradycardia. Continue flecainide and follow up in 6 months with repeat ecg and labs.       Plan  Reduce metoprolol to 12.5mg daily (1/2 tablet)  Continue flecainide 50mg twice daily  Referral to sleep medicine for CHANDLER evaluation  Follow up in 6 months      Exclusive of any other services or procedures performed, IElin APRN-CNP , spent 40 minutes in duration for this visit today.  This time consisted of chart review, obtaining history, and/or performing the exam as documented above as well as documenting the clinical information for the encounter in the electronic record, discussing treatment options, plans, and/or goals with patient, family, and/or caregiver, refilling medications, updating the electronic record, ordering medicines, lab work, imaging, referrals, and/or procedures as documented above and communicating with other [facility] professionals. I have discussed the results of laboratory, radiology, and cardiology studies with the patient and their family/caregiver.

## 2024-10-21 NOTE — PATIENT INSTRUCTIONS
Reduce metoprolol to 12.5mg daily (1/2 tablet)  Continue flecainide 50mg twice daily  Referral to sleep medicine for CHANDLER evaluation  Follow up in 6 months

## 2024-12-09 ENCOUNTER — APPOINTMENT (OUTPATIENT)
Dept: PRIMARY CARE | Facility: CLINIC | Age: 54
End: 2024-12-09
Payer: COMMERCIAL

## 2024-12-09 ENCOUNTER — PHARMACY VISIT (OUTPATIENT)
Dept: PHARMACY | Facility: CLINIC | Age: 54
End: 2024-12-09
Payer: COMMERCIAL

## 2024-12-09 VITALS
TEMPERATURE: 96.8 F | RESPIRATION RATE: 20 BRPM | BODY MASS INDEX: 33.59 KG/M2 | HEART RATE: 78 BPM | OXYGEN SATURATION: 99 % | DIASTOLIC BLOOD PRESSURE: 84 MMHG | HEIGHT: 72 IN | WEIGHT: 248 LBS | SYSTOLIC BLOOD PRESSURE: 135 MMHG

## 2024-12-09 DIAGNOSIS — I10 ESSENTIAL HYPERTENSION, BENIGN: ICD-10-CM

## 2024-12-09 DIAGNOSIS — E55.9 VITAMIN D DEFICIENCY: ICD-10-CM

## 2024-12-09 DIAGNOSIS — Z23 NEED FOR INFLUENZA VACCINATION: Primary | ICD-10-CM

## 2024-12-09 DIAGNOSIS — E11.9 TYPE 2 DIABETES MELLITUS WITHOUT COMPLICATION, WITHOUT LONG-TERM CURRENT USE OF INSULIN (MULTI): ICD-10-CM

## 2024-12-09 DIAGNOSIS — Z00.00 ANNUAL PHYSICAL EXAM: ICD-10-CM

## 2024-12-09 PROCEDURE — 3079F DIAST BP 80-89 MM HG: CPT

## 2024-12-09 PROCEDURE — 90656 IIV3 VACC NO PRSV 0.5 ML IM: CPT

## 2024-12-09 PROCEDURE — 90471 IMMUNIZATION ADMIN: CPT

## 2024-12-09 PROCEDURE — 99396 PREV VISIT EST AGE 40-64: CPT

## 2024-12-09 PROCEDURE — RXMED WILLOW AMBULATORY MEDICATION CHARGE

## 2024-12-09 PROCEDURE — 99204 OFFICE O/P NEW MOD 45 MIN: CPT

## 2024-12-09 PROCEDURE — 1036F TOBACCO NON-USER: CPT

## 2024-12-09 PROCEDURE — 3075F SYST BP GE 130 - 139MM HG: CPT

## 2024-12-09 PROCEDURE — 3008F BODY MASS INDEX DOCD: CPT

## 2024-12-09 RX ORDER — HYDROCHLOROTHIAZIDE 12.5 MG/1
1 CAPSULE ORAL 4 TIMES DAILY
Qty: 4 EACH | Refills: 3 | Status: SHIPPED | OUTPATIENT
Start: 2024-12-09

## 2024-12-09 SDOH — ECONOMIC STABILITY: FOOD INSECURITY: WITHIN THE PAST 12 MONTHS, THE FOOD YOU BOUGHT JUST DIDN'T LAST AND YOU DIDN'T HAVE MONEY TO GET MORE.: NEVER TRUE

## 2024-12-09 SDOH — ECONOMIC STABILITY: FOOD INSECURITY: WITHIN THE PAST 12 MONTHS, YOU WORRIED THAT YOUR FOOD WOULD RUN OUT BEFORE YOU GOT MONEY TO BUY MORE.: NEVER TRUE

## 2024-12-09 ASSESSMENT — ENCOUNTER SYMPTOMS
EYES NEGATIVE: 1
GASTROINTESTINAL NEGATIVE: 1
DEPRESSION: 0
MUSCULOSKELETAL NEGATIVE: 1
PSYCHIATRIC NEGATIVE: 1
CONSTITUTIONAL NEGATIVE: 1
NEUROLOGICAL NEGATIVE: 1
RESPIRATORY NEGATIVE: 1
OCCASIONAL FEELINGS OF UNSTEADINESS: 0
LOSS OF SENSATION IN FEET: 0
CARDIOVASCULAR NEGATIVE: 1
ALLERGIC/IMMUNOLOGIC NEGATIVE: 1
ENDOCRINE NEGATIVE: 1
HEMATOLOGIC/LYMPHATIC NEGATIVE: 1

## 2024-12-09 ASSESSMENT — PATIENT HEALTH QUESTIONNAIRE - PHQ9
2. FEELING DOWN, DEPRESSED OR HOPELESS: NOT AT ALL
SUM OF ALL RESPONSES TO PHQ9 QUESTIONS 1 & 2: 0
1. LITTLE INTEREST OR PLEASURE IN DOING THINGS: NOT AT ALL

## 2024-12-09 ASSESSMENT — LIFESTYLE VARIABLES
HOW OFTEN DO YOU HAVE A DRINK CONTAINING ALCOHOL: MONTHLY OR LESS
SKIP TO QUESTIONS 9-10: 1
HOW MANY STANDARD DRINKS CONTAINING ALCOHOL DO YOU HAVE ON A TYPICAL DAY: 1 OR 2
HOW OFTEN DO YOU HAVE SIX OR MORE DRINKS ON ONE OCCASION: NEVER
AUDIT-C TOTAL SCORE: 1

## 2024-12-09 ASSESSMENT — PAIN SCALES - GENERAL: PAINLEVEL_OUTOF10: 0-NO PAIN

## 2024-12-09 NOTE — PROGRESS NOTES
Subjective   Patient ID: Maritza Garcia is a 54 y.o. female who presents for New Patient Visit and Annual Exam.    HPI     Maritza presents to establish care and for an annual exam. She endorses that she was with her previous PCP for several years and she would like to discuss starting a GLP-1 to help manage her DM 2 and possibly reduce some of her other medication. She is currently taking 1000 mg metformin BID- she does not have a device to check her blood sugars- freestyle geraldine 3 plus sent to pharmacy as well as semaglutide.  Maritza endorse that she works out several times a week and eats a relatively healthy diet to help manage her blood sugars; she also has been struggling with losing weight and is hoping the GLP-1 can assist with that.  She follows with cardiology for a-fib.  Lab work reviewed in office- complete fasting labs prior to next appointment to further discuss discontinuing any medications.  Flu vaccine received today.     Review of Systems   Constitutional: Negative.    HENT: Negative.     Eyes: Negative.    Respiratory: Negative.     Cardiovascular: Negative.    Gastrointestinal: Negative.    Endocrine: Negative.    Genitourinary: Negative.    Musculoskeletal: Negative.    Skin: Negative.    Allergic/Immunologic: Negative.    Neurological: Negative.    Hematological: Negative.    Psychiatric/Behavioral: Negative.         Objective   /84 (BP Location: Right arm, Patient Position: Sitting, BP Cuff Size: Large adult)   Pulse 78   Temp 36 °C (96.8 °F) (Temporal)   Resp 20   Ht 1.829 m (6')   Wt 112 kg (248 lb)   SpO2 99%   BMI 33.63 kg/m²     Physical Exam  Cardiovascular:      Rate and Rhythm: Normal rate and regular rhythm.      Pulses: Normal pulses.      Heart sounds: Normal heart sounds.   Pulmonary:      Effort: Pulmonary effort is normal.      Breath sounds: Normal breath sounds.   Musculoskeletal:         General: Normal range of motion.   Skin:     General: Skin is warm  and dry.      Capillary Refill: Capillary refill takes less than 2 seconds.   Neurological:      Mental Status: She is oriented to person, place, and time.   Psychiatric:         Mood and Affect: Mood normal.         Behavior: Behavior normal.         Thought Content: Thought content normal.         Judgment: Judgment normal.         Assessment/Plan   Problem List Items Addressed This Visit             ICD-10-CM    Essential hypertension, benign I10    Relevant Orders    CBC    TSH with reflex to Free T4 if abnormal    Type 2 diabetes mellitus without complication, without long-term current use of insulin (Multi) E11.9    Relevant Medications    semaglutide 0.25 mg or 0.5 mg (2 mg/3 mL) pen injector    blood-glucose sensor (FreeStyle Ramesh 3 Plus Sensor) device    Other Relevant Orders    Referral to Clinical Pharmacy    Comprehensive Metabolic Panel    Hemoglobin A1C    Lipid Panel    Need for influenza vaccination - Primary Z23    Relevant Orders    Flu vaccine, trivalent, preservative free, age 6 months and greater (Fluarix/Fluzone/Flulaval) (Completed)    Annual physical exam Z00.00    Vitamin D deficiency E55.9    Relevant Orders    Vitamin D 25-Hydroxy,Total (for eval of Vitamin D levels)

## 2024-12-09 NOTE — PATIENT INSTRUCTIONS
Thank you for coming to see me today.  If you have any questions or concerns following our visit, please contact the office.  Phone: [phone]    Follow up with me in 3 months    1)  Complete fasting labs in 2 months    2) I have sent a prescription to your pharmacy for the semaglutide- I have also placed a referral to our clinical pharmacist to help manage      3) Monitor your blood sugars at home with your freestyle geraldine. Send me a picture of the long through [employer] after about 2 weeks

## 2024-12-18 NOTE — PROGRESS NOTES
Pharmacist Clinic: Diabetes Management  Maritza Garcia is a 54 y.o. female was referred to Clinical Pharmacy Team for diabetes management.   Referring Provider: Annel Zarate*  - Last visit with referring provider: 12/9/24    Subjective     HPI    Current Diabetes Pharmacotherapy:    - Ozempic 0.25 mg weekly - Monday (2 doses so far)  - Metformin XR 1,000 mg in the morning and 500 mg at night    Social History:  Current diet:   - B: smoothie, earl rustamayesha mahmood, fruit  - L: italian small plate; pepperoni and cheese/crackers; apple/grapes  - D: does not eat bread; wendys salad  - Some chocolate during the holiday  - Water    Current exercise:   - Gym 3 days a week  - Yoga    Weight:  - Baseline: 248 lbs  - Current: 244 lbs    Current monitoring regimen:   Patient is using: continuous glucose monitor  Type of CGM: Ramesh 3 plus - phone    Reported blood sugars:   See APG report below    Any episodes of hypoglycemia? Yes - some throughout the day in the 60's    Adverse Effects: Constant diarrhea with Metformin; Occasional nausea after injections          Objective     There were no vitals taken for this visit.    Allergies   Allergen Reactions    Lisinopril Anaphylaxis    Ace Inhibitors Swelling    Cat Hair Standardized Allergenic Extract Unknown     Congested       Historical Diabetes Pharmacotherapy:  - Metformin XL 1,000 mg BID (lows at night)    SECONDARY PREVENTION  - Statin? Yes   - ACE-I/ARB? No  - Aspirin? No    Pertinent PMH Review:  - PMH of Pancreatitis: No  - PMH of Retinopathy: No  - PMH of Urinary Tract Infections: No  - PMH of Yeast Infections: No  - PMH of MTC: No    Lab Review  Lab Results   Component Value Date    BILITOT 0.2 03/15/2024    CALCIUM 8.7 03/16/2024    CO2 26 03/16/2024     03/16/2024    CREATININE 0.92 03/16/2024    GLUCOSE 132 (H) 03/16/2024    ALKPHOS 64 03/15/2024    K 4.0 03/16/2024    PROT 7.0 03/15/2024     03/16/2024    AST 25 03/15/2024    ALT 18  03/15/2024    BUN 16 03/16/2024    ANIONGAP 11 03/16/2024    MG 1.87 03/16/2024    PHOS 2.7 03/16/2024    ALBUMIN 3.5 03/16/2024     Lab Results   Component Value Date    TRIG 492 (H) 03/16/2024    CHOL 150 03/16/2024    HDL 26.8 03/16/2024     Lab Results   Component Value Date    HGBA1C 5.9 (H) 10/08/2024    HGBA1C 6.5 (H) 07/01/2024    HGBA1C 6.4 (H) 02/02/2024     The 10-year ASCVD risk score (Tre VIVEROS, et al., 2019) is: 7.2%    Values used to calculate the score:      Age: 54 years      Sex: Female      Is Non- : No      Diabetic: Yes      Tobacco smoker: No      Systolic Blood Pressure: 135 mmHg      Is BP treated: Yes      HDL Cholesterol: 26.8 mg/dL      Total Cholesterol: 150 mg/dL    Drug Interactions:  None    Affordability/Accessibility:  - CGM = $75/mo.    Preferred Pharmacy:  [facility]    Assessment/Plan   Problem List Items Addressed This Visit       Type 2 diabetes mellitus without complication, without long-term current use of insulin (Multi)    Relevant Orders    Referral to Clinical Pharmacy       ASSESSMENT:  Patients diabetes is controlled with most recent A1c of 5.9%.     Referred for DM management. Looking to reduce amount of medications as well as help with weight loss with GLP. She is having lows throughout the day. She self decreased Metformin to 500 mg in the afternoon but seems to still be having some in the morning. Will decrease Metformin to 500 mg BID. Plan to have her probably stop it completely since she barely breaks 100 in the day. She has only done 2 doses of 0.25 mg of Ozempic so will have her continue and consider dose increase next appt. I will also discuss with her option of enrolling in [facility].    PLAN:  DECREASE Metformin XR to 500 mg BID   CONTINUE Ozempic 0.25 mg   Follow up with clinical pharmacist: 1/6/25 @ 2:40   Follow up with PCP: 3/10/25    Thank you,  Feli Ortiz, PharmD  Clinical Pharmacy  Specialist  [phone]  [email or web address]     Continue all meds under the continuation of care with the referring provider and clinical pharmacy team.

## 2024-12-19 ENCOUNTER — APPOINTMENT (OUTPATIENT)
Dept: PHARMACY | Facility: HOSPITAL | Age: 54
End: 2024-12-19
Payer: COMMERCIAL

## 2024-12-19 DIAGNOSIS — E11.9 TYPE 2 DIABETES MELLITUS WITHOUT COMPLICATION, WITHOUT LONG-TERM CURRENT USE OF INSULIN (MULTI): ICD-10-CM

## 2024-12-29 DIAGNOSIS — I48.0 PAROXYSMAL ATRIAL FIBRILLATION (MULTI): ICD-10-CM

## 2024-12-29 RX ORDER — FLECAINIDE ACETATE 50 MG/1
50 TABLET ORAL 2 TIMES DAILY
Qty: 180 TABLET | Refills: 2 | Status: CANCELLED | OUTPATIENT
Start: 2024-12-29 | End: 2025-12-29

## 2024-12-30 DIAGNOSIS — I48.0 PAROXYSMAL ATRIAL FIBRILLATION (MULTI): ICD-10-CM

## 2024-12-30 PROCEDURE — RXMED WILLOW AMBULATORY MEDICATION CHARGE

## 2025-01-03 PROCEDURE — RXMED WILLOW AMBULATORY MEDICATION CHARGE

## 2025-01-03 RX ORDER — FLECAINIDE ACETATE 50 MG/1
50 TABLET ORAL 2 TIMES DAILY
Qty: 180 TABLET | Refills: 1 | Status: SHIPPED | OUTPATIENT
Start: 2025-01-03 | End: 2025-07-02

## 2025-01-06 ENCOUNTER — APPOINTMENT (OUTPATIENT)
Dept: PHARMACY | Facility: HOSPITAL | Age: 55
End: 2025-01-06
Payer: COMMERCIAL

## 2025-01-06 DIAGNOSIS — E11.9 TYPE 2 DIABETES MELLITUS WITHOUT COMPLICATION, WITHOUT LONG-TERM CURRENT USE OF INSULIN (MULTI): ICD-10-CM

## 2025-01-06 DIAGNOSIS — F41.9 ANXIETY AND DEPRESSION: Primary | ICD-10-CM

## 2025-01-06 DIAGNOSIS — F32.A ANXIETY AND DEPRESSION: Primary | ICD-10-CM

## 2025-01-06 PROCEDURE — RXMED WILLOW AMBULATORY MEDICATION CHARGE

## 2025-01-06 RX ORDER — SEMAGLUTIDE 0.68 MG/ML
0.5 INJECTION, SOLUTION SUBCUTANEOUS
Qty: 3 ML | Refills: 1 | Status: SHIPPED | OUTPATIENT
Start: 2025-01-06

## 2025-01-06 RX ORDER — ESCITALOPRAM OXALATE 20 MG/1
30 TABLET ORAL DAILY
Qty: 135 TABLET | Refills: 1 | Status: SHIPPED | OUTPATIENT
Start: 2025-01-06 | End: 2025-07-05

## 2025-01-06 NOTE — PROGRESS NOTES
[facility] ([facility])  Diabetes    Maritza Garcia is a 54 y.o. female was referred to Clinical Pharmacy Team for diabetes management.   Referring Provider: Annel Zarate*  - Last visit with referring provider: 12/9/24    Subjective     HPI    Current Diabetes Pharmacotherapy:    - Ozempic 0.25 mg weekly - Monday (4 doses so far)  - Metformin  mg BID    Social History:  Current diet:   - B: smoothie, earl mahmood, fruit  - L: italian small plate; pepperoni and cheese/crackers; apple/grapes  - D: does not eat bread; Wendys salad  - Some chocolate during the holiday  - Water  - Feeling decrease appetite/portion sizes (nothing too much but some)    Current exercise:   - Gym 3 days a week  - Yoga    Weight:  - Baseline: 248 lbs  - Weight last appt: 244 lbs  - Current: 243 lbs    Current monitoring regimen:   Patient is using: continuous glucose monitor  Type of CGM: [employer] 3 plus - phone     Reported blood sugars:   See APG report below    Any episodes of hypoglycemia? No    Adverse Effects: Nausea has decreased over the last 2 weeks          Objective     There were no vitals taken for this visit.    Allergies   Allergen Reactions    Lisinopril Anaphylaxis    Ace Inhibitors Swelling    Cat Hair Standardized Allergenic Extract Unknown     Congested       Historical Diabetes Pharmacotherapy:  - Metformin XL 1,000 mg BID (lows at night)    SECONDARY PREVENTION  - Statin? Yes   - ACE-I/ARB? No  - Aspirin? No    Pertinent PMH Review:  - PMH of Pancreatitis: No  - PMH of Retinopathy: No  - PMH of Urinary Tract Infections: No  - PMH of Yeast Infections: No  - PMH of MTC: No    Lab Review  Lab Results   Component Value Date    BILITOT 0.2 03/15/2024    CALCIUM 8.7 03/16/2024    CO2 26 03/16/2024     03/16/2024    CREATININE 0.92 03/16/2024    GLUCOSE 132 (H) 03/16/2024    ALKPHOS 64 03/15/2024    K 4.0 03/16/2024    PROT 7.0 03/15/2024     03/16/2024     AST 25 03/15/2024    ALT 18 03/15/2024    BUN 16 03/16/2024    ANIONGAP 11 03/16/2024    MG 1.87 03/16/2024    PHOS 2.7 03/16/2024    ALBUMIN 3.5 03/16/2024     Lab Results   Component Value Date    TRIG 492 (H) 03/16/2024    CHOL 150 03/16/2024    HDL 26.8 03/16/2024     Lab Results   Component Value Date    HGBA1C 5.9 (H) 10/08/2024    HGBA1C 6.5 (H) 07/01/2024    HGBA1C 6.4 (H) 02/02/2024     The 10-year ASCVD risk score (Tre VIVEROS, et al., 2019) is: 7.2%    Values used to calculate the score:      Age: 54 years      Sex: Female      Is Non- : No      Diabetic: Yes      Tobacco smoker: No      Systolic Blood Pressure: 135 mmHg      Is BP treated: Yes      HDL Cholesterol: 26.8 mg/dL      Total Cholesterol: 150 mg/dL    Drug Interactions:  None     Affordability/Accessibility:  - CGM = $75/mo.    Preferred Pharmacy:  [facility]    Assessment/Plan   Problem List Items Addressed This Visit       Anxiety and depression - Primary    Relevant Medications    escitalopram (Lexapro) 20 mg tablet    Type 2 diabetes mellitus without complication, without long-term current use of insulin (Multi)    Relevant Medications    semaglutide (Ozempic) 0.25 mg or 0.5 mg (2 mg/3 mL) pen injector    Other Relevant Orders    Referral to Clinical Pharmacy       ASSESSMENT:  Patients diabetes is controlled with most recent A1c of 5.9%.     Blood sugars looking fantastic at 100% in target range on CGM. Weight loss has been slow. Therefore will increase Ozempic to 0.5 mg weekly. We will also get her enrolled in [employer]. Also went over extensively on some diabetes educated.    *Requesting refill on Lexapro. Got ok from [facility]. Will send in script.    PLAN:  INCREASE Ozempic to 0.5 mg weekly   CONTINUE all other DM medications  Follow up with clinical pharmacist: 2/3/25 @ 11  Follow up with PCP: 3/10/25    Thank you,  Feli Ortiz, PharmD  Clinical Pharmacy Specialist  [phone]  [email or web address]      Continue all meds under the continuation of care with the referring provider and clinical pharmacy team.

## 2025-01-07 ENCOUNTER — PHARMACY VISIT (OUTPATIENT)
Dept: PHARMACY | Facility: CLINIC | Age: 55
End: 2025-01-07
Payer: COMMERCIAL

## 2025-01-08 ENCOUNTER — PHARMACY VISIT (OUTPATIENT)
Dept: PHARMACY | Facility: CLINIC | Age: 55
End: 2025-01-08
Payer: COMMERCIAL

## 2025-01-23 ENCOUNTER — OFFICE VISIT (OUTPATIENT)
Dept: SLEEP MEDICINE | Facility: CLINIC | Age: 55
End: 2025-01-23
Payer: COMMERCIAL

## 2025-01-23 VITALS
BODY MASS INDEX: 33.32 KG/M2 | RESPIRATION RATE: 16 BRPM | DIASTOLIC BLOOD PRESSURE: 87 MMHG | HEIGHT: 72 IN | WEIGHT: 246 LBS | OXYGEN SATURATION: 99 % | TEMPERATURE: 96.2 F | HEART RATE: 55 BPM | SYSTOLIC BLOOD PRESSURE: 148 MMHG

## 2025-01-23 DIAGNOSIS — G47.34 SLEEP RELATED HYPOXIA: ICD-10-CM

## 2025-01-23 DIAGNOSIS — E66.9 OBESITY (BMI 30-39.9): ICD-10-CM

## 2025-01-23 DIAGNOSIS — I48.0 PAROXYSMAL ATRIAL FIBRILLATION (MULTI): ICD-10-CM

## 2025-01-23 DIAGNOSIS — G47.33 OSA (OBSTRUCTIVE SLEEP APNEA): Primary | ICD-10-CM

## 2025-01-23 DIAGNOSIS — I10 BENIGN ESSENTIAL HYPERTENSION: ICD-10-CM

## 2025-01-23 PROCEDURE — 1036F TOBACCO NON-USER: CPT | Performed by: INTERNAL MEDICINE

## 2025-01-23 PROCEDURE — 99244 OFF/OP CNSLTJ NEW/EST MOD 40: CPT | Performed by: INTERNAL MEDICINE

## 2025-01-23 PROCEDURE — 3077F SYST BP >= 140 MM HG: CPT | Performed by: INTERNAL MEDICINE

## 2025-01-23 PROCEDURE — 3008F BODY MASS INDEX DOCD: CPT | Performed by: INTERNAL MEDICINE

## 2025-01-23 PROCEDURE — 99214 OFFICE O/P EST MOD 30 MIN: CPT | Performed by: INTERNAL MEDICINE

## 2025-01-23 PROCEDURE — 3079F DIAST BP 80-89 MM HG: CPT | Performed by: INTERNAL MEDICINE

## 2025-01-23 ASSESSMENT — SLEEP AND FATIGUE QUESTIONNAIRES
SITING INACTIVE IN A PUBLIC PLACE LIKE A CLASS ROOM OR A MOVIE THEATER: WOULD NEVER DOZE
HOW LIKELY ARE YOU TO NOD OFF OR FALL ASLEEP WHILE SITTING AND TALKING TO SOMEONE: WOULD NEVER DOZE
HOW LIKELY ARE YOU TO NOD OFF OR FALL ASLEEP IN A CAR, WHILE STOPPED FOR A FEW MINUTES IN TRAFFIC: WOULD NEVER DOZE
SATISFACTION_WITH_CURRENT_SLEEP_PATTERN: VERY SATISFIED
HOW LIKELY ARE YOU TO NOD OFF OR FALL ASLEEP WHEN YOU ARE A PASSENGER IN A CAR FOR AN HOUR WITHOUT A BREAK: SLIGHT CHANCE OF DOZING
SLEEP_PROBLEM_NOTICEABLE_TO_OTHERS: SOMEWHAT
HOW LIKELY ARE YOU TO NOD OFF OR FALL ASLEEP WHILE LYING DOWN TO REST IN THE AFTERNOON WHEN CIRCUMSTANCES PERMIT: HIGH CHANCE OF DOZING
HOW LIKELY ARE YOU TO NOD OFF OR FALL ASLEEP WHILE SITTING AND READING: WOULD NEVER DOZE
DIFFICULTY_FALLING_ASLEEP: MILD
SLEEP_PROBLEM_INTERFERES_DAILY_ACTIVITIES: NOT AT ALL NOTICEABLE
DIFFICULTY_STAYING_ASLEEP: MILD
WORRIED_DISTRESSED_DUE_TO_SLEEP: A LITTLE
ESS-CHAD TOTAL SCORE: 4
HOW LIKELY ARE YOU TO NOD OFF OR FALL ASLEEP WHILE WATCHING TV: WOULD NEVER DOZE
HOW LIKELY ARE YOU TO NOD OFF OR FALL ASLEEP WHILE SITTING QUIETLY AFTER LUNCH WITHOUT ALCOHOL: WOULD NEVER DOZE

## 2025-01-23 NOTE — PATIENT INSTRUCTIONS
"Thank you for coming to the Sleep Medicine Clinic today! Your sleep medicine provider today was: Elva Singh MD Below is a summary of your treatment plan, patient education, other important information, and our contact numbers.      TREATMENT PLAN     - Do the following testing: PAP titration study  - Please read the \"Patient Education\" section below for more detailed information. Try implementing tips, reminders, strategies, and supportive management.   - If not yet done, please sign up for [employer] to make a future schedule, send prescription requests, or send messages.    Follow-up Appointment:   Follow-up in 2-3 weeks after sleep study.    PATIENT EDUCATION     OBSTRUCTIVE SLEEP APNEA (CHANDLER) is a sleep disorder where your upper airway muscles relax during sleep and the airway intermittently and repetitively narrows and collapses leading to partially blocked airway (hypopnea) or completely blocked airway (apnea) which, in turn, can disrupt breathing in sleep, lower oxygen levels while you sleep and cause night time wakings. Because both apnea and hypopnea may cause higher carbon dioxide or low oxygen levels, untreated CHANDLER can lead to heart arrhythmia, elevation of blood pressure, and make it harder for the body to consolidate memory and facilitate metabolism (leading to higher blood sugars at night). Frequent partial arousals occur during sleep resulting in sleep deprivation and daytime sleepiness. CHANDLER is associated with an increased risk of cardiovascular disease, stroke, hypertension, and insulin resistance. Moreover, untreated CHANDLER with excessive daytime sleepiness can increase the risk of motor vehicular accidents.    Below are conservative strategies for CHANDLER regardless of CHANDLER severity are:   Positional therapy - Avoid sleeping on your back.   Healthy diet and regular exercise to optimize weight is highly encouraged.   Avoid alcohol late in the evening and sedative-hypnotics as these substances can make sleep " apnea worse.   Improve breathing through the nose with intranasal steroid spray, saline rinse, or antihistamines    Safety: Avoid driving vehicle and operating heavy equipment while sleepy. Drowsy driving may lead to life-threatening motor vehicle accidents. A person driving while sleepy is 5 times more likely to have an accident. If you feel sleepy, pull over and take a short power nap (sleep for less than 30 minutes). Otherwise, ask somebody to drive you.    Treatment options for sleep apnea include weight management, positional therapy, Positive Airway Therapy (PAP) therapy, oral appliance therapy, hypoglossal nerve stimulator (Inspire) and select airway surgeries.    Sleep Testing for sleep apnea: The best and ideal way to check out if you have sleep apnea is to do an overnight sleep study in the sleep laboratory. Alternatively, a home sleep apnea test can also be done depending on your insurance and risk factors.     If you are having a home sleep apnea test, kindly allot 1 hour during pickup of the testing kit as you will have to complete paperwork and listen to the sleep technician for in-person on-the-spot demonstration and instructions on how to hook up the testing kit at home. Do the test for 1 day and start off with sleeping on your back. If you sleep on your side in the middle of night or you have always been a side or stomach-sleeper, it is ok as long as you have some time on your back and off-back.     If you are having an overnight in-lab sleep study, please make sure to bring toiletries, a comfy pillow, additional warm blankets, and any nighttime medications (include as-needed inhaler, pain pill, etc) that you may regularly take. Also, be sure to eat dinner before you arrive as we generally do not provide meals inside the sleep testing center. Lastly, in order to fall asleep faster in the sleep testing center, we advise patients to wake up 2 hours earlier on the morning of scheduled testing and avoid  napping 2 days prior testing. Sometimes, your sleep provider may prescribe a sleep aid to be taken at lights out in the sleep testing center. If you are taking a sleep aid, consider having somebody pick you up after the sleep testing.    Overnight sleep studies may be scheduled on a weekday or weekend. We also perform daytime testing for shift workers on a case-by-case basis.    Once you have booked an appointment to do the sleep study, please contact my office for follow-up visit to discuss results.    On the other hand, if you have any of the following, please consider calling the sleep testing center to RESCHEDULE your sleep study appointment:  If you tested positive for COVID within 10 days of your sleep study appointment.  If you were exposed to somebody who was confirmed for COVID within 10 days of your sleep study appointment and now you are having symptoms of possible COVID  If you have fever>100F or any acute symptoms that you think will lead to poor sleep during testing (e.g. new or worsening stuffy nose not relieved by steroid nasal spray)  If you have traveled domestically or internationally in the last month and now you are having symptoms of possible COVID    You can also go to the following EDUCATION WEBSITES for further information:   American Academy of Sleep Medicine http://sleepeducation.org  National Sleep Foundation: https://sleepfoundation.org  American Sleep Apnea Association: https://www.sleepapnea.org (for patients with sleep apnea)  Narcolepsy Network: https://www.narcolepsynetwork.org (for patients with narcolepsy)  [employer]: https://www.wakeupnarcolepsy.org (for patients with narcolepsy)  Hypersomnia Foundation: https://www.hypersomniafoundation.org (for patients with idiopathic hypersomnia)  RLS foundation: https://www.rls.org (for patients with restless leg syndrome)    IMPORTANT INFORMATION     Call 911 for medical emergencies.  Our offices are generally open from  Monday-Friday, 8 am - 5 pm.   There are no supporting services by either the sleep doctors or their staff on weekends and Holidays, or after 5 PM on weekdays.   If you need to get in touch with me, you may either call my office number or you can use [employer].  If a referral for a test, for CPAP, or for another specialist was made, and you have not heard about scheduling this within a week, please call scheduling at [phone]).  If you are unable to make your appointment for clinic or an overnight study, kindly call the office or sleep testing center at least 48 hours in advance to cancel and reschedule.  If you are on CPAP, please bring your device's card and/or the device to each clinic appointment.   In case of problems with PAP machine or mask interface, please contact your [employer] (Durable Medical [employer]) company first. [employer] is the company who provides you the machine and/or PAP supplies.       PRESCRIPTIONS     We require 7 days advanced notice for prescription refills. If we do not receive the request in this time, we cannot guarantee that your medication will be refilled in time.    IMPORTANT PHONE NUMBERS     Sleep Medicine Clinic Fax: [phone]  Appointments (for Pediatric Sleep Clinic): [phone] - option 1  Appointments (for Adult Sleep Clinic): [phone] - option 2  Appointments (For Sleep Studies): [phone] - option 3  Behavioral Sleep Medicine: [phone]  Sleep Surgery: [phone]  Nutrition Service: [phone]  Weight management clinics with endocrinology: [phone]  Bariatric Services: [phone] (includes weight loss medications and weight loss surgery)  [facility]: [phone] (offers holistic approaches to weight management)  ENT (Otolaryngology): [phone]  Headache Clinic (Neurology): [phone]  Neurology: [phone]  Psychiatry: [phone]  Pulmonary Function Testing (PFT) Center: [phone]  Pulmonary Medicine:  [phone]  [facility] [employer] (DME): [phone]      OUR SLEEP TESTING LOCATIONS     Our team will contact you to schedule your sleep study, however, you can contact us as follow:  Main Phone Line (scheduling only): [phone]), option 3  Adult and Pediatric Locations  [facility] (6 years and older): [facility] - 4th floor ([address] [facility]) After hours line: [phone]  [facility] (Main campus: All ages): [facility], 6th floor. After hours line: [phone]   [place] (5 years and older; younger considered on case-by-case basis): [address]; [address], [address]. Scheduling  After hours line: [phone]   [place] (6 years and older): [address]; [address]; [address]   [place] (6 years and older): [address] [facility], [address]  After hours line: [phone]   [place] (13 years and older) in [place]: [address], 2nd floor  After hours line: [phone]  [facility] (13 year and older): [address]  After hours line: [phone]     Adult Only Locations:   [place] (18 years and older): [address] [facility], 2nd floor   [place] (18 years and older): [address] [facility]; 4th floor  After hours line: [phone]  [facility] (18 years and older) at [place]: [address] [facility]  After hours line: [phone]     CONTACTING YOUR SLEEP MEDICINE PROVIDER AND SLEEP TEAM      For issues with your machine or mask interface, please call your DME provider first. [employer] stands for durable medical company. [employer] is the company who provides you the machine and/or PAP supplies / accessories.   To schedule, cancel, or reschedule SLEEP STUDY APPOINTMENTS, please call the Main Phone Line at [phone] - option 3.   To schedule, cancel, or reschedule CLINIC APPOINTMENTS, you can do it in \"MyChart\", call [phone] (direct line of [place] " "clinic), call [phone] (direct line of [facility]), or call the Main Phone Line at [phone] - option 2  For CLINICAL QUESTIONS or MEDICATION REFILLS, please call direct line for Adult Sleep Nurses at [phone].   Lastly, you can also send a message directly to your provider through \"My Chart\", which is the email service through your  Records Account: [email or web address]       Here at [facility], we wish you a restful sleep!    "

## 2025-01-23 NOTE — PROGRESS NOTES
[facility] SLEEP MEDICINE   NEW CONSULT VISIT NOTE      PCP: CURT Sosa-CNP  Referred by: Elin Vaz A*    HISTORY OF PRESENT ILLNESS     Patient ID: Maritza Garcia is a 54 y.o. female who presents to [facility] for a comprehensive sleep medicine evaluation with concerns of sleep apnea recently diagnosed.    Patient is here alone today.  To review, patient's medical history is notable for obesity (BMI 33), atrial fibrillation, HTN, DM type II, GERD, seasonal allergies, ADHD, anxiety, depression, and CHANDLER    Patient had home sleep study in 5/9/2024 which showed CHANDLER but no CPAP started yet. Referred by cardiology as she has atrial fibrillation. Severe sleep apnea on home study and states  says she snores and stops breathing.    SLEEP STUDY HISTORY (personally reviewed raw data such as interpretation report, data sheet, hypnogram, and titration table if available and applicable)  HSAT 5/9/2024: AHI3% 44.2 (Supine AHI3% 59.8, non-supine AHI3% 14), AHI4% 41.5 (supine AHI4% 56.6, non-supine AHI4% 12.3), SpO2 jones 67.3%, spent 159.4 mins with SpO2<89%    SLEEP-WAKE SCHEDULE  Bedtime:  9 PM to 9:30 PM daily  Subjective sleep latency: less than 5 minutes  Difficulty falling asleep: No  Number of awakenings: once per night to go to bathroom  Falls back asleep right away  Final wake time:  6 AM on weekdays, 7:30 AM on weekends  Out of bed time:  6 AM on weekdays, 8 AM on weekends  Shift work: No  Naps: once a week and last 30 min to one hour  Feels rested after a nap: Yes sometimes  Average sleep duration (excluding naps): 8 hours     SLEEP ENVIRONMENT  Sleep location: bed  Sleep status: sleeps with  and pet cat  Preferred sleep position: back and side  TV in bedroom: yes but TV not turned on during bedtime  Room is dark: Yes  Room is quiet: Yes  Room is cool: Yes    SLEEP HABITS  Activities before bedtime: watching tv and on  phone  Activities in bed: on phone   Clock-watching: No  Smoking: no  ETOH: one glass of wine a week  Marijuana: no  Caffeine: two cups of coffee in the morning and one cup of tea in the afternoon  Sleep aids: trazodone     SLEEP ROS  Night symptoms: POSITIVE for snoring, witnessed apnea, nasal congestion , and mouth breathing and NEGATIVE for wake up gasping and/or choking for air, night sweats during sleep, waking up with racing heart, heartburn or sour taste in mouth at night, nocturnal cough, and nocturia  Morning symptoms: POSITIVE for refreshing sleep and morning dry mouth and NEGATIVE for morning headache and morning sore throat  Daytime symptoms POSITIVE for trouble remembering things in daytime, trouble staying focused in daytime, and irritability in daytime and NEGATIVE for excessive daytime sleepiness, fatigue, and drowsy driving  Hypersomnia / narcolepsy symptoms: Patient denies symptoms of a hypersomnolence disorder such as sleep paralysis, sleep-related hallucinations, and cataplexy.   Parasomnia symptoms: Patient denies symptoms of parasomnia such as sleepwalking, sleeptalking, sleep-eating, acting out dreams, and nightmares. Patient complains of vivid dreams.  Movements in sleep: Patient denies problematic movements in sleep such as seizures during sleep, frequent leg kicks / jerks while asleep, sleep-related bruxism, and waking up with bedsheets in disarray.    RLS screen: Patient denies RLS symptoms.    WEIGHT: lost 20 lbs in one year     Claustrophobia: No    REVIEW OF SYSTEMS     All other systems have been reviewed and are negative.    ALLERGIES     Allergies   Allergen Reactions    Lisinopril Anaphylaxis    Ace Inhibitors Swelling    Cat Hair Standardized Allergenic Extract Unknown     Congested       MEDICATIONS     Current Outpatient Medications   Medication Sig Dispense Refill    albuterol 90 mcg/actuation inhaler Inhale 2 puffs into the lungs every 4-6 hours as needed for Wheezing or  Shortness of Breath 18 g 11    apixaban (Eliquis) 5 mg tablet Take 1 tablet (5 mg) by mouth every 12 hours. 180 tablet 3    atorvastatin (Lipitor) 20 mg tablet Take 1 tablet by mouth daily 90 tablet 3    blood-glucose sensor (FreeStyle Ramesh 3 Plus Sensor) device Use to check blood sugar 4 times daily. Change sensor every 14 days 4 each 3    escitalopram (Lexapro) 20 mg tablet Take 1.5 tablets (30 mg) by mouth once daily. 135 tablet 1    flecainide (Tambocor) 50 mg tablet Take 1 tablet (50 mg) by mouth 2 times a day. 180 tablet 1    fluticasone (Flonase) 50 mcg/actuation nasal spray use 2 sprays into the nostril(s) daily 48 g 3    gabapentin (Neurontin) 100 mg capsule Take 2 capsules by mouth nightly. 180 capsule 3    metFORMIN  mg 24 hr tablet Take 2 tablets by mouth 2 times daily (before meals) 360 tablet 3    metoprolol succinate XL (Toprol-XL) 25 mg 24 hr tablet Take 0.5 tablets (12.5 mg) by mouth once daily. Do not crush or chew. 45 tablet 3    multivitamin tablet Take by mouth.      semaglutide (Ozempic) 0.25 mg or 0.5 mg (2 mg/3 mL) pen injector Inject 0.5 mg under the skin every 7 days. 3 mL 1    traZODone (Desyrel) 150 mg tablet Take 1 tablet by mouth nightly 90 tablet 3    triamterene-hydrochlorothiazid (Maxzide-25) 37.5-25 mg tablet Take 1 tablet by mouth daily 90 tablet 3     No current facility-administered medications for this visit.       PAST HISTORIES     PERTINENT PAST MEDICAL HISTORY: See HPI    PERTINENT PAST SURGICAL HISTORY for Sleep Medicine:  non-contributory    PERTINENT FAMILY HISTORY for Sleep Medicine:  sleep apnea on PAP- son    PERTINENT SOCIAL HISTORY:  She  reports that she has quit smoking. Her smoking use included cigarettes. She has a 2.5 pack-year smoking history. She has never used smokeless tobacco. She reports current alcohol use of about 2.0 standard drinks of alcohol per week. She reports that she does not use drugs. She currently lives with family and employed in  [place] [place]     Active Problems, Allergy List, Medication List, and PMH/PSH/FH/Social Hx have been reviewed and reconciled in chart. No significant changes unless documented in the pertinent chart section. Updates made when necessary.     PHYSICAL EXAM     VITAL SIGNS: /87   Pulse 55   Temp 35.7 °C (96.2 °F)   Resp 16   Ht 1.829 m (6')   Wt 112 kg (246 lb)   SpO2 99%   BMI 33.36 kg/m²     NECK CIRCUMFERENCE: 17 inches    ESS: 4  INDER: 5  STOPBAN    Physical Exam  Constitutional: Awake, not in distress  Head: normocephalic, atraumatic  Craniofacial: no retrognathia  Sinus: no tenderness to palpation  Nose: + nasal congestion, bilateral  Dental: Class 1 bite, mild overjet, no crossbite  Palate: Narrow and high arched  Oropharynx: Alvarado tongue position 3, Mallampati 3, lateral wall narrowing grade 4  Uvula: midline on phonation, no elongated, no swollen  Tongue: Midline on protrusion, + Tongue scalloping, no macroglossia  Lungs: Clear to auscultation bilateral, no rales  Heart: Regular rate and rhythm, no murmurs  Skin: Warm, no rash  Neuro: No tremors, moves all extremities  Psych: alert and oriented to time, place, and person    RESULTS/DATA     No results found for: \"IRON\", \"TRANSFERRIN\", \"IRONSAT\", \"TIBC\", \"FERRITIN\"    Bicarbonate   Date Value Ref Range Status   2024 26 21 - 32 mmol/L Final         ASSESSMENT/PLAN     Maritza Garcia is a 54 y.o. female who is seen today in [facility] for the following problems:.     OBSTRUCTIVE SLEEP APNEA, SEVERE positional (HSAT AHI3% 44.2, AHI4% 41.5)  SLEEP-RELATED HYPOXEMIA  - Personally reviewed the sleep study's raw data such as interpretation report, data sheet, and hypnogram. A copy of recent testing was either given to patient or released in [email or web address]. See HPI for detailed summary of sleep study results.  - Due to severity of sleep apnea and significant oxygen desaturation, recommend titration study with CO2 " monitoring to evaluate effective pressure settings to control sleep apnea.   - Start CPAP at 5 cm H2O, increase by 2 cm H2O increments, and switch to BPAP if persistent events noted at CPAP 15 cm H2O or if patient had CPAP intolerance. May add O2 or backup rate to BPAP if needed per protocol.  - Supportive management as follows: Lose weight. Stay off your back when sleeping. Avoid smoking, alcohol, and sedating medications if applicable. Don't drive when sleepy.    SEASONAL ALLERGIES  - Continue fluticasone nasal spray 2 sprays per nostril once daily 1 hour before bedtime.  - If there is inadequate or lack of improvement on the above intranasal steroid spray, consider adding Azelastine nasal spray, 2 sprays per nostril once daily in the morning.   - Alternatively, may add cetirizine or loratadine 10 mg once daily.   - Educated patient on proper use of intranasal sprays.     OBESITY  - Recommend weight loss with diet and exercise.  - Weight loss can help in long term management of CHANDLER.  - Defer management to PCP.    HYPERTENSION  - BP slightly high today. Denies chest discomfort, shortness of breath, palpitations, headache, blurred vision, dizziness, or neurologic deficit.  - Untreated  or inadequately treated sleep apnea can lead to new onset hypertension, resistant hypertension, or refractory hypertension.  - Continue anti-hypertensive medications per PCP.  - Supportive management: low salt DASH diet (less than 2000 mg sodium intake daily), moderate intensity aerobic exercise at least 30 minutes 5 days per week, reduce stress, quit smoking, limit alcohol, lose weight, and monitor BP once daily  - PCP following. Defer management to PCP.    ATRIAL FIBRILLATION, PAROXYSMAL  - currently in sinus rhythm  - Continue meds per Cardio.  - Cardio following. Defer management to Cardio.    Follow-up in 2-3 weeks after sleep study.    Attending Attestation: I saw and evaluated this patient with 4th year medical student Aneta  Trey. I independently obtained the key and critical portions of the history and physical exam from the patient. Also, I reviewed the 4th year medical student's documentation and made the above necessary corrections if applicable. I discussed the patient's case with the 4th year medical student and agreed with medical decision making as documented in the note.       All of patient's questions were answered. She verbalizes understanding and agreement with my assessment and plan. Refer to after-visit-summary (AVS) for patient education and more details on sleep study preparation, troubleshooting issues with PAP usage, proper cleaning instructions of PAP supplies, and usual recommended replacement schedule for each of the PAP supplies.

## 2025-01-30 ENCOUNTER — DOCUMENTATION (OUTPATIENT)
Dept: PHARMACY | Facility: HOSPITAL | Age: 55
End: 2025-01-30
Payer: COMMERCIAL

## 2025-01-30 DIAGNOSIS — F41.9 ANXIETY AND DEPRESSION: ICD-10-CM

## 2025-01-30 DIAGNOSIS — F32.A ANXIETY AND DEPRESSION: ICD-10-CM

## 2025-01-30 DIAGNOSIS — E11.9 TYPE 2 DIABETES MELLITUS WITHOUT COMPLICATION, WITHOUT LONG-TERM CURRENT USE OF INSULIN (MULTI): Primary | ICD-10-CM

## 2025-01-30 NOTE — PROGRESS NOTES
Patient contacted pharmacy because labs did not transfer over with [facility] transition in lab services. Re-ordered labs today so patient can complete them.     Giulia Stephenson, PharmD  Clinical Pharmacy Specialist

## 2025-01-31 NOTE — PROGRESS NOTES
[facility] ([facility])  Diabetes    Maritza Garcia is a 54 y.o. female was referred to Clinical Pharmacy Team for diabetes management.   Referring Provider: Annel Zarate*  - Last visit with referring provider: 12/9/24    Subjective     HPI    Current Diabetes Pharmacotherapy:    - Ozempic 0.5 mg weekly - Monday (4th dose is today)  - Metformin  mg BID    Social History:  Current diet:   - B: smoothie, earl mahmood, fruit  - L: italian small plate; pepperoni and cheese/crackers; apple/grapes  - D: does not eat bread; Wendys salad  - Some chocolate during the holiday  - Water  - Feeling decrease appetite/portion sizes   - Tries to force self to eat because not hungry     Current exercise:   - Gym 3 days a week  - Yoga    Weight:  - Baseline: 248 lbs  - Weight last appt: 243 lbs  - Current: 236 lbs    Current monitoring regimen:   Patient is using: continuous glucose monitor  Type of CGM: Ramesh 3 plus - phone     Reported blood sugars:   See APG report below    Any episodes of hypoglycemia? No    Adverse Effects: Nausea has decreased over the last 2 weeks          Objective     There were no vitals taken for this visit.    Allergies   Allergen Reactions    Lisinopril Anaphylaxis    Ace Inhibitors Swelling    Cat Hair Standardized Allergenic Extract Unknown     Congested       Historical Diabetes Pharmacotherapy:  - Metformin XL 1,000 mg BID (lows at night)    SECONDARY PREVENTION  - Statin? Yes   - ACE-I/ARB? No  - Aspirin? No    Pertinent PMH Review:  - PMH of Pancreatitis: No  - PMH of Retinopathy: No  - PMH of Urinary Tract Infections: No  - PMH of Yeast Infections: No  - PMH of MTC: No    Lab Review  Lab Results   Component Value Date    BILITOT 0.2 03/15/2024    CALCIUM 8.7 03/16/2024    CO2 26 03/16/2024     03/16/2024    CREATININE 0.92 03/16/2024    GLUCOSE 132 (H) 03/16/2024    ALKPHOS 64 03/15/2024    K 4.0 03/16/2024    PROT 7.0  03/15/2024     03/16/2024    AST 25 03/15/2024    ALT 18 03/15/2024    BUN 16 03/16/2024    ANIONGAP 11 03/16/2024    MG 1.87 03/16/2024    PHOS 2.7 03/16/2024    ALBUMIN 3.5 03/16/2024     Lab Results   Component Value Date    TRIG 492 (H) 03/16/2024    CHOL 150 03/16/2024    HDL 26.8 03/16/2024     Lab Results   Component Value Date    HGBA1C 5.9 (H) 10/08/2024    HGBA1C 6.5 (H) 07/01/2024    HGBA1C 6.4 (H) 02/02/2024     The 10-year ASCVD risk score (Tre VIVEROS, et al., 2019) is: 8.6%    Values used to calculate the score:      Age: 54 years      Sex: Female      Is Non- : No      Diabetic: Yes      Tobacco smoker: No      Systolic Blood Pressure: 148 mmHg      Is BP treated: Yes      HDL Cholesterol: 26.8 mg/dL      Total Cholesterol: 150 mg/dL    Drug Interactions:  None     Affordability/Accessibility:  - CGM = $75/mo.    Preferred Pharmacy:  [facility]    Assessment/Plan   Problem List Items Addressed This Visit       Type 2 diabetes mellitus without complication, without long-term current use of insulin (Multi)    Relevant Medications    metFORMIN XR (Glucophage-XR) 500 mg 24 hr tablet    Other Relevant Orders    Referral to Clinical Pharmacy     ASSESSMENT:  Patients diabetes is controlled with most recent A1c of 5.9%.     Patient has lost about 7 lbs since last time we spoke. States she is definitely feeling sandra faster and sometimes has to force herself to eat. Told her to try to eat smaller meals/snacks throughout the day to help. Blood sugars also looking fantastic. Some lows at night listed in CGM. Will decrease Metformin XR to 500 mg daily since sugars are running near 80 overnight. Will also keep Ozempic at 0.5 mg for now.    PLAN:  DECREASE Metformin XR to 500 mg daily  CONTINUE Ozempic 0.5 mg weekly  Follow up with clinical pharmacist: 3/3/25 @ 11:20   Follow up with PCP: 3/10/25    Thank you,  Feli Ortiz, PharmD  Clinical Pharmacy  Specialist  [phone]  [email or web address]     Continue all meds under the continuation of care with the referring provider and clinical pharmacy team.

## 2025-02-03 ENCOUNTER — APPOINTMENT (OUTPATIENT)
Dept: PHARMACY | Facility: HOSPITAL | Age: 55
End: 2025-02-03
Payer: COMMERCIAL

## 2025-02-03 DIAGNOSIS — E11.9 TYPE 2 DIABETES MELLITUS WITHOUT COMPLICATION, WITHOUT LONG-TERM CURRENT USE OF INSULIN (MULTI): ICD-10-CM

## 2025-02-03 PROCEDURE — RXMED WILLOW AMBULATORY MEDICATION CHARGE

## 2025-02-03 RX ORDER — METFORMIN HYDROCHLORIDE 500 MG/1
500 TABLET, EXTENDED RELEASE ORAL DAILY
Qty: 90 TABLET | Refills: 3 | Status: SHIPPED | OUTPATIENT
Start: 2025-02-03 | End: 2026-02-03

## 2025-02-06 PROCEDURE — RXMED WILLOW AMBULATORY MEDICATION CHARGE

## 2025-02-08 ENCOUNTER — PHARMACY VISIT (OUTPATIENT)
Dept: PHARMACY | Facility: CLINIC | Age: 55
End: 2025-02-08
Payer: COMMERCIAL

## 2025-02-27 LAB
25(OH)D3+25(OH)D2 SERPL-MCNC: 49 NG/ML (ref 30–100)
ALBUMIN SERPL-MCNC: 4.1 G/DL (ref 3.6–5.1)
ALP SERPL-CCNC: 41 U/L (ref 37–153)
ALT SERPL-CCNC: 21 U/L (ref 6–29)
ANION GAP SERPL CALCULATED.4IONS-SCNC: 8 MMOL/L (CALC) (ref 7–17)
AST SERPL-CCNC: 20 U/L (ref 10–35)
BILIRUB SERPL-MCNC: 0.5 MG/DL (ref 0.2–1.2)
BUN SERPL-MCNC: 8 MG/DL (ref 7–25)
CALCIUM SERPL-MCNC: 9.1 MG/DL (ref 8.6–10.4)
CHLORIDE SERPL-SCNC: 104 MMOL/L (ref 98–110)
CHOLEST SERPL-MCNC: 123 MG/DL
CHOLEST/HDLC SERPL: 2.9 (CALC)
CO2 SERPL-SCNC: 29 MMOL/L (ref 20–32)
CREAT SERPL-MCNC: 1.07 MG/DL (ref 0.5–1.03)
EGFRCR SERPLBLD CKD-EPI 2021: 62 ML/MIN/1.73M2
ERYTHROCYTE [DISTWIDTH] IN BLOOD BY AUTOMATED COUNT: 12.5 % (ref 11–15)
EST. AVERAGE GLUCOSE BLD GHB EST-MCNC: 114 MG/DL
EST. AVERAGE GLUCOSE BLD GHB EST-SCNC: 6.3 MMOL/L
GLUCOSE SERPL-MCNC: 106 MG/DL (ref 65–99)
HBA1C MFR BLD: 5.6 % OF TOTAL HGB
HCT VFR BLD AUTO: 42.7 % (ref 35–45)
HDLC SERPL-MCNC: 43 MG/DL
HGB BLD-MCNC: 14.2 G/DL (ref 11.7–15.5)
LDLC SERPL CALC-MCNC: 58 MG/DL (CALC)
MCH RBC QN AUTO: 30.6 PG (ref 27–33)
MCHC RBC AUTO-ENTMCNC: 33.3 G/DL (ref 32–36)
MCV RBC AUTO: 92 FL (ref 80–100)
NONHDLC SERPL-MCNC: 80 MG/DL (CALC)
PLATELET # BLD AUTO: 307 THOUSAND/UL (ref 140–400)
PMV BLD REES-ECKER: 10.5 FL (ref 7.5–12.5)
POTASSIUM SERPL-SCNC: 4.1 MMOL/L (ref 3.5–5.3)
PROT SERPL-MCNC: 6.5 G/DL (ref 6.1–8.1)
RBC # BLD AUTO: 4.64 MILLION/UL (ref 3.8–5.1)
SODIUM SERPL-SCNC: 141 MMOL/L (ref 135–146)
TRIGL SERPL-MCNC: 140 MG/DL
TSH SERPL-ACNC: 0.86 MIU/L
WBC # BLD AUTO: 4.5 THOUSAND/UL (ref 3.8–10.8)

## 2025-02-28 ENCOUNTER — CLINICAL SUPPORT (OUTPATIENT)
Dept: SLEEP MEDICINE | Facility: CLINIC | Age: 55
End: 2025-02-28
Payer: COMMERCIAL

## 2025-02-28 DIAGNOSIS — G47.33 OSA (OBSTRUCTIVE SLEEP APNEA): ICD-10-CM

## 2025-02-28 NOTE — PROGRESS NOTES
[facility] ([facility])  Diabetes    Maritza Garcia is a 54 y.o. female was referred to Clinical Pharmacy Team for diabetes management.   Referring Provider: Annel Zarate*  - Last visit with referring provider: 12/9/24    Subjective     HPI    Current Diabetes Pharmacotherapy:    - Ozempic 0.5 mg weekly - Monday  - Metformin  mg daily    Social History:  Current diet:   - B: smoothearl ding, fruit  - L: italian small plate; pepperoni and cheese/crackers; apple/grapes  - D: does not eat bread; Wendys salad  - Some chocolate during the holiday  - Water  - Feeling decrease appetite/portion sizes - not as much anymore   - Tries to force self to eat because not hungry     Current exercise:   - Gym 3 days a week  - Yoga    Weight:  - Baseline: 248 lbs  - Weight last appt: 236 lbs  - Current weight: 235.2 lbs    Current monitoring regimen:   Patient is using: continuous glucose monitor  Type of CGM: Cit[employer] Style 3 plus - phone     Reported blood sugars:   See APG report below    Any episodes of hypoglycemia? No    Adverse Effects: Some nausea that lasts a few mins (tolerable)          Objective     There were no vitals taken for this visit.    Allergies   Allergen Reactions    Lisinopril Anaphylaxis    Ace Inhibitors Swelling    Cat Hair Standardized Allergenic Extract Unknown     Congested       Historical Diabetes Pharmacotherapy:  - Metformin XL 1,000 mg BID (lows at night)    SECONDARY PREVENTION  - Statin? Yes   - ACE-I/ARB? No  - Aspirin? No    Pertinent PMH Review:  - PMH of Pancreatitis: No  - PMH of Retinopathy: No  - PMH of Urinary Tract Infections: No  - PMH of Yeast Infections: No  - PMH of MTC: No    Lab Review  Lab Results   Component Value Date    BILITOT 0.5 02/26/2025    CALCIUM 9.1 02/26/2025    CO2 29 02/26/2025     02/26/2025    CREATININE 1.07 (H) 02/26/2025    GLUCOSE 106 (H) 02/26/2025    ALKPHOS 41 02/26/2025    K 4.1 02/26/2025     PROT 6.5 02/26/2025     02/26/2025    AST 20 02/26/2025    ALT 21 02/26/2025    BUN 8 02/26/2025    ANIONGAP 8 02/26/2025    MG 1.87 03/16/2024    PHOS 2.7 03/16/2024    ALBUMIN 4.1 02/26/2025     Lab Results   Component Value Date    TRIG 140 02/26/2025    CHOL 123 02/26/2025    HDL 43 (L) 02/26/2025     Lab Results   Component Value Date    HGBA1C 5.6 02/26/2025    HGBA1C 5.9 (H) 10/08/2024    HGBA1C 6.5 (H) 07/01/2024     The ASCVD Risk score (Tre VIVEROS, et al., 2019) failed to calculate for the following reasons:    The valid total cholesterol range is 130 to 320 mg/dL    Drug Interactions:  None     Affordability/Accessibility:  - CGM = $75/mo.    Preferred Pharmacy:  [facility]    Assessment/Plan   Problem List Items Addressed This Visit       Type 2 diabetes mellitus without complication, without long-term current use of insulin (Multi)    Relevant Medications    semaglutide (Ozempic) 1 mg/dose (4 mg/3 mL) pen injector    Other Relevant Orders    Referral to Clinical Pharmacy       ASSESSMENT:  Patients diabetes is controlled with most recent A1c of 5.6%.     A1c has further improved and still at goal. CGM states she is 100% in target range. Has not noticed much weight loss since last visit. Feeling hungrier now. Will increase Ozempic to 1 mg.      PLAN:  INCREASE Ozempic to 1 mg weeklu  Follow up with clinical pharmacist: 3/31/25 @ 11:20   Follow up with PCP: 3/10/25    Thank you,  Feli Ortiz, PharmD  Clinical Pharmacy Specialist  [phone]  [email or web address]     Continue all meds under the continuation of care with the referring provider and clinical pharmacy team.

## 2025-03-01 VITALS
WEIGHT: 246.91 LBS | HEIGHT: 72 IN | DIASTOLIC BLOOD PRESSURE: 87 MMHG | BODY MASS INDEX: 33.44 KG/M2 | SYSTOLIC BLOOD PRESSURE: 148 MMHG

## 2025-03-01 ASSESSMENT — SLEEP AND FATIGUE QUESTIONNAIRES
HOW LIKELY ARE YOU TO NOD OFF OR FALL ASLEEP WHILE SITTING QUIETLY AFTER LUNCH WITHOUT ALCOHOL: WOULD NEVER DOZE
HOW LIKELY ARE YOU TO NOD OFF OR FALL ASLEEP WHEN YOU ARE A PASSENGER IN A CAR FOR AN HOUR WITHOUT A BREAK: WOULD NEVER DOZE
ESS-CHAD TOTAL SCORE: 2
HOW LIKELY ARE YOU TO NOD OFF OR FALL ASLEEP WHILE SITTING AND READING: WOULD NEVER DOZE
HOW LIKELY ARE YOU TO NOD OFF OR FALL ASLEEP WHILE SITTING AND TALKING TO SOMEONE: WOULD NEVER DOZE
HOW LIKELY ARE YOU TO NOD OFF OR FALL ASLEEP WHILE LYING DOWN TO REST IN THE AFTERNOON WHEN CIRCUMSTANCES PERMIT: MODERATE CHANCE OF DOZING
SITING INACTIVE IN A PUBLIC PLACE LIKE A CLASS ROOM OR A MOVIE THEATER: WOULD NEVER DOZE
HOW LIKELY ARE YOU TO NOD OFF OR FALL ASLEEP IN A CAR, WHILE STOPPED FOR A FEW MINUTES IN TRAFFIC: WOULD NEVER DOZE
HOW LIKELY ARE YOU TO NOD OFF OR FALL ASLEEP WHILE WATCHING TV: WOULD NEVER DOZE

## 2025-03-01 NOTE — PROGRESS NOTES
[facility] TECH NOTE:     Patient: Maritza Garcia   MRN//AGE: [id number]  1970  54 y.o.   Technologist: Enrrique Mike RPSGT   Room: 1   Service Date: 25        Sleep Testing Location: [facility]    [place]: 2    TECHNOLOGIST SLEEP STUDY PROCEDURE NOTE:   This sleep study is being conducted according to the policies and procedures outlined by the AAS accreditation standards.  The sleep study procedure and processes involved during this appointment was explained to the patient/patient’s family, questions were answered. The patient/family verbalized understanding.      The patient is a 54 y.o. year old female scheduled for aCPAP titration with montage of:  PAP . she arrived for her appointment.      The study that was ultimately completed was aCPAP titration with montage of:  PAP .    The full study Was completed.  Patient questionnaires completed?: yes     Consents signed? yes    Initial Fall Risk Screening:     Maritza has not fallen in the last 6 months. her did not result in injury. Maritza does not have a fear of falling. He does not need assistance with sitting, standing, or walking. she does not need assistance walking in her home. she does not need assistance in an unfamiliar setting. The patient is notusing an assistive device.     Brief Study observations: 55 y/o female patient presents for a PAP titration sleep study with TcCO2 monitoring. She arrived to the lab by herself at 8:00 PM. Procedures were explained to the patient and she expressed understanding. She was pleasant and cooperative throughout the procedure. CPAP recall consent form was signed.    Deviation to order/protocol and reason: NA      If PAP, which was preferred mask/pressure/mode: CPAP; RESMED AirFit P10 nasal pillows (XS)      Other:None    After the procedure, the patient/family was informed to ensure followup with ordering clinician for testing results.      Technologist: Enrrique Mike  RPSGT

## 2025-03-03 ENCOUNTER — TELEPHONE (OUTPATIENT)
Dept: PRIMARY CARE | Facility: CLINIC | Age: 55
End: 2025-03-03

## 2025-03-03 ENCOUNTER — APPOINTMENT (OUTPATIENT)
Dept: PHARMACY | Facility: HOSPITAL | Age: 55
End: 2025-03-03
Payer: COMMERCIAL

## 2025-03-03 DIAGNOSIS — E11.9 TYPE 2 DIABETES MELLITUS WITHOUT COMPLICATION, WITHOUT LONG-TERM CURRENT USE OF INSULIN (MULTI): ICD-10-CM

## 2025-03-03 PROCEDURE — RXMED WILLOW AMBULATORY MEDICATION CHARGE

## 2025-03-03 RX ORDER — SEMAGLUTIDE 1.34 MG/ML
1 INJECTION, SOLUTION SUBCUTANEOUS
Qty: 3 ML | Refills: 1 | Status: SHIPPED | OUTPATIENT
Start: 2025-03-03

## 2025-03-03 NOTE — TELEPHONE ENCOUNTER
----- Message from Annel Zarate sent at 2/28/2025 11:28 AM EST -----  Labs stable- will discuss during next office visit.

## 2025-03-07 ENCOUNTER — PHARMACY VISIT (OUTPATIENT)
Dept: PHARMACY | Facility: CLINIC | Age: 55
End: 2025-03-07
Payer: COMMERCIAL

## 2025-03-07 PROCEDURE — RXMED WILLOW AMBULATORY MEDICATION CHARGE

## 2025-03-10 ENCOUNTER — APPOINTMENT (OUTPATIENT)
Dept: PRIMARY CARE | Facility: CLINIC | Age: 55
End: 2025-03-10
Payer: COMMERCIAL

## 2025-03-10 VITALS
HEIGHT: 72 IN | DIASTOLIC BLOOD PRESSURE: 84 MMHG | SYSTOLIC BLOOD PRESSURE: 121 MMHG | BODY MASS INDEX: 32.51 KG/M2 | OXYGEN SATURATION: 98 % | WEIGHT: 240 LBS | TEMPERATURE: 96.6 F | RESPIRATION RATE: 14 BRPM | HEART RATE: 60 BPM

## 2025-03-10 DIAGNOSIS — E11.9 TYPE 2 DIABETES MELLITUS WITHOUT COMPLICATION, WITHOUT LONG-TERM CURRENT USE OF INSULIN (MULTI): ICD-10-CM

## 2025-03-10 DIAGNOSIS — I10 ESSENTIAL HYPERTENSION, BENIGN: Primary | ICD-10-CM

## 2025-03-10 PROCEDURE — 3008F BODY MASS INDEX DOCD: CPT

## 2025-03-10 PROCEDURE — 3074F SYST BP LT 130 MM HG: CPT

## 2025-03-10 PROCEDURE — 99213 OFFICE O/P EST LOW 20 MIN: CPT

## 2025-03-10 PROCEDURE — 3079F DIAST BP 80-89 MM HG: CPT

## 2025-03-10 PROCEDURE — 1036F TOBACCO NON-USER: CPT

## 2025-03-10 SDOH — ECONOMIC STABILITY: FOOD INSECURITY: WITHIN THE PAST 12 MONTHS, THE FOOD YOU BOUGHT JUST DIDN'T LAST AND YOU DIDN'T HAVE MONEY TO GET MORE.: NEVER TRUE

## 2025-03-10 SDOH — ECONOMIC STABILITY: FOOD INSECURITY: WITHIN THE PAST 12 MONTHS, YOU WORRIED THAT YOUR FOOD WOULD RUN OUT BEFORE YOU GOT MONEY TO BUY MORE.: NEVER TRUE

## 2025-03-10 ASSESSMENT — ENCOUNTER SYMPTOMS
CARDIOVASCULAR NEGATIVE: 1
RESPIRATORY NEGATIVE: 1
GASTROINTESTINAL NEGATIVE: 1
MUSCULOSKELETAL NEGATIVE: 1
ALLERGIC/IMMUNOLOGIC NEGATIVE: 1
HEMATOLOGIC/LYMPHATIC NEGATIVE: 1
NEUROLOGICAL NEGATIVE: 1
PSYCHIATRIC NEGATIVE: 1
ENDOCRINE NEGATIVE: 1

## 2025-03-10 ASSESSMENT — ANXIETY QUESTIONNAIRES
GAD7 TOTAL SCORE: 0
3. WORRYING TOO MUCH ABOUT DIFFERENT THINGS: NOT AT ALL
7. FEELING AFRAID AS IF SOMETHING AWFUL MIGHT HAPPEN: NOT AT ALL
6. BECOMING EASILY ANNOYED OR IRRITABLE: NOT AT ALL
1. FEELING NERVOUS, ANXIOUS, OR ON EDGE: NOT AT ALL
2. NOT BEING ABLE TO STOP OR CONTROL WORRYING: NOT AT ALL
4. TROUBLE RELAXING: NOT AT ALL
5. BEING SO RESTLESS THAT IT IS HARD TO SIT STILL: NOT AT ALL

## 2025-03-10 ASSESSMENT — LIFESTYLE VARIABLES
HOW OFTEN DO YOU HAVE A DRINK CONTAINING ALCOHOL: 2-3 TIMES A WEEK
HOW OFTEN DO YOU HAVE SIX OR MORE DRINKS ON ONE OCCASION: NEVER
AUDIT-C TOTAL SCORE: 3
HOW MANY STANDARD DRINKS CONTAINING ALCOHOL DO YOU HAVE ON A TYPICAL DAY: 1 OR 2
SKIP TO QUESTIONS 9-10: 1

## 2025-03-10 ASSESSMENT — PAIN SCALES - GENERAL: PAINLEVEL_OUTOF10: 0-NO PAIN

## 2025-03-10 NOTE — PATIENT INSTRUCTIONS
Thank you for coming to see me today.  If you have any questions or concerns following our visit, please contact the office.  Phone: [phone]    Follow up in 6 months    1)  Continue following with Feli to assist with your Ozempic

## 2025-03-10 NOTE — ASSESSMENT & PLAN NOTE
Hemoglobin A1c 5.6- discussed with clinical pharmacy that it is fine for Maritza to stop metformin completely and just continue Ozempic.

## 2025-03-10 NOTE — PROGRESS NOTES
Subjective   Patient ID: Maritza Garcia is a 54 y.o. female who presents for Diabetes (Follow up, wants to go over sleep study if possible.).    HPI     Maritza presents for a follow up appointment- she did have an in-center sleep study completed on 2/28, but I do not see results for it yet to discuss with her.   Lab work reviewed in detail during office visit; I did touch base with Feli in Clinical pharmacy to discuss Maritza stopping her metformin and she is agreeable to this based on her Ramesh report and last hemoglobin A1c result of 5.6- metformin discontinued from med list.  No other concerns at this time.     Review of Systems   HENT: Negative.     Respiratory: Negative.     Cardiovascular: Negative.    Gastrointestinal: Negative.    Endocrine: Negative.    Genitourinary: Negative.    Musculoskeletal: Negative.    Skin: Negative.    Allergic/Immunologic: Negative.    Neurological: Negative.    Hematological: Negative.    Psychiatric/Behavioral: Negative.         Objective   /84 (BP Location: Left arm, Patient Position: Sitting, BP Cuff Size: Adult)   Pulse 60   Temp 35.9 °C (96.6 °F) (Temporal)   Resp 14   Ht 1.829 m (6')   Wt 109 kg (240 lb)   SpO2 98%   BMI 32.55 kg/m²     Physical Exam  Cardiovascular:      Rate and Rhythm: Normal rate and regular rhythm.      Pulses: Normal pulses.      Heart sounds: Normal heart sounds.   Pulmonary:      Effort: Pulmonary effort is normal.      Breath sounds: Normal breath sounds.   Musculoskeletal:         General: Normal range of motion.   Skin:     General: Skin is warm and dry.      Capillary Refill: Capillary refill takes less than 2 seconds.   Neurological:      Mental Status: She is oriented to person, place, and time.   Psychiatric:         Mood and Affect: Mood normal.         Behavior: Behavior normal.         Thought Content: Thought content normal.         Judgment: Judgment normal.         Assessment/Plan   Problem List Items Addressed  This Visit             ICD-10-CM    Essential hypertension, benign - Primary I10     /84 today         Type 2 diabetes mellitus without complication, without long-term current use of insulin (Multi) E11.9     Hemoglobin A1c 5.6- discussed with clinical pharmacy that it is fine for Maritza to stop metformin completely and just continue Ozempic.

## 2025-03-28 NOTE — PROGRESS NOTES
[facility] ([facility]  Diabetes    Maritza Garcia is a 55 y.o. female was referred to Clinical Pharmacy Team for diabetes management.   Referring Provider: Annel Zarate*  - Last visit with referring provider: 12/9/24   - Transitioning to Mayda Roper     Subjective     HPI    Current Diabetes Pharmacotherapy:    - Ozempic 1 mg weekly - Monday (today is 4th dose)    Social History:  Current diet:   - B: smoothie, earl mahmood, fruit  - L: italian small plate; pepperoni and cheese/crackers; apple/grapes  - D: does not eat bread; Wendys salad  - Some chocolate during the holiday  - Water  - Feeling decrease appetite/portion sizes  - Tries to force self to eat because not hungry     Current exercise:   - Gym 3 days a week  - Yoga    Weight:  - Baseline: 248 lbs  - Weight last appt: 235.2 lbs  - Current weight: 233.8 lbs    Current monitoring regimen:   Patient is using: continuous glucose monitor  Type of CGM: [employer] 3 plus - phone     Reported blood sugars:   See APG report below    Any episodes of hypoglycemia? No    Adverse Effects: A lot of gas for a couple weeks; has gotten better now          Objective     There were no vitals taken for this visit.    Allergies   Allergen Reactions    Lisinopril Anaphylaxis    Ace Inhibitors Swelling    Cat Hair Standardized Allergenic Extract Unknown     Congested       Historical Diabetes Pharmacotherapy:  - Metformin XL 1,000 mg BID (lows at night)    SECONDARY PREVENTION  - Statin? Yes   - ACE-I/ARB? No  - Aspirin? No    Pertinent PMH Review:  - PMH of Pancreatitis: No  - PMH of Retinopathy: No  - PMH of Urinary Tract Infections: No  - PMH of Yeast Infections: No  - PMH of MTC: No    Lab Review  Lab Results   Component Value Date    BILITOT 0.5 02/26/2025    CALCIUM 9.1 02/26/2025    CO2 29 02/26/2025     02/26/2025    CREATININE 1.07 (H) 02/26/2025    GLUCOSE 106 (H) 02/26/2025    ALKPHOS 41 02/26/2025    K  4.1 02/26/2025    PROT 6.5 02/26/2025     02/26/2025    AST 20 02/26/2025    ALT 21 02/26/2025    BUN 8 02/26/2025    ANIONGAP 8 02/26/2025    MG 1.87 03/16/2024    PHOS 2.7 03/16/2024    ALBUMIN 4.1 02/26/2025     Lab Results   Component Value Date    TRIG 140 02/26/2025    CHOL 123 02/26/2025    HDL 43 (L) 02/26/2025     Lab Results   Component Value Date    HGBA1C 5.6 02/26/2025    HGBA1C 5.9 (H) 10/08/2024    HGBA1C 6.5 (H) 07/01/2024     The ASCVD Risk score (Tre VIVEROS, et al., 2019) failed to calculate for the following reasons:    The valid total cholesterol range is 130 to 320 mg/dL    Drug Interactions:  - None     Affordability/Accessibility:  - CGM = $75/mo.    Preferred Pharmacy:  - [facility]    Assessment/Plan   Problem List Items Addressed This Visit       Type 2 diabetes mellitus without complication, without long-term current use of insulin (Multi)    Relevant Medications    semaglutide (Ozempic) 2 mg/dose (8 mg/3 mL) pen injector    Other Relevant Orders    Referral to Clinical Pharmacy       ASSESSMENT:  Patients diabetes is controlled with most recent A1c of 5.6%.     Metformin stopped a few weeks ago due to controlled A1c. Just on Ozempic at this time. Has lost about 2 lbs since we last spoke. Had a few weeks of a lot of gas but has subsided. She'd like to go up on dose. Will increase to 2 mg and consider switching to Mounjaro in future.    PLAN:  INCREASE Ozempic to 2 mg weekly  Follow up with clinical pharmacist: 5/5/25 @ 11:20   Follow up with PCP: 9/15/25    Thank you,  Feli Ortiz, PharmD  Clinical Pharmacy Specialist  [phone]  [email or web address]     Continue all meds under the continuation of care with the referring provider and clinical pharmacy team.

## 2025-03-31 ENCOUNTER — APPOINTMENT (OUTPATIENT)
Dept: PHARMACY | Facility: HOSPITAL | Age: 55
End: 2025-03-31
Payer: COMMERCIAL

## 2025-03-31 DIAGNOSIS — I48.0 PAROXYSMAL ATRIAL FIBRILLATION (MULTI): ICD-10-CM

## 2025-03-31 DIAGNOSIS — E11.9 TYPE 2 DIABETES MELLITUS WITHOUT COMPLICATION, WITHOUT LONG-TERM CURRENT USE OF INSULIN: ICD-10-CM

## 2025-03-31 PROCEDURE — RXMED WILLOW AMBULATORY MEDICATION CHARGE

## 2025-03-31 RX ORDER — SEMAGLUTIDE 2.68 MG/ML
2 INJECTION, SOLUTION SUBCUTANEOUS
Qty: 3 ML | Refills: 1 | Status: SHIPPED | OUTPATIENT
Start: 2025-03-31

## 2025-04-01 DIAGNOSIS — I48.0 PAROXYSMAL ATRIAL FIBRILLATION (MULTI): ICD-10-CM

## 2025-04-01 RX ORDER — APIXABAN 5 MG/1
5 TABLET, FILM COATED ORAL EVERY 12 HOURS
Qty: 180 TABLET | Refills: 3 | Status: CANCELLED | OUTPATIENT
Start: 2025-03-31 | End: 2026-03-31

## 2025-04-02 ENCOUNTER — OFFICE VISIT (OUTPATIENT)
Dept: SLEEP MEDICINE | Facility: CLINIC | Age: 55
End: 2025-04-02
Payer: COMMERCIAL

## 2025-04-02 VITALS
WEIGHT: 237.3 LBS | BODY MASS INDEX: 32.18 KG/M2 | RESPIRATION RATE: 16 BRPM | DIASTOLIC BLOOD PRESSURE: 82 MMHG | SYSTOLIC BLOOD PRESSURE: 124 MMHG | HEART RATE: 60 BPM | OXYGEN SATURATION: 95 %

## 2025-04-02 DIAGNOSIS — G47.33 OSA (OBSTRUCTIVE SLEEP APNEA): Primary | ICD-10-CM

## 2025-04-02 DIAGNOSIS — G47.34 SLEEP RELATED HYPOXIA: ICD-10-CM

## 2025-04-02 PROCEDURE — 99214 OFFICE O/P EST MOD 30 MIN: CPT | Performed by: INTERNAL MEDICINE

## 2025-04-02 PROCEDURE — 3079F DIAST BP 80-89 MM HG: CPT | Performed by: INTERNAL MEDICINE

## 2025-04-02 PROCEDURE — 3074F SYST BP LT 130 MM HG: CPT | Performed by: INTERNAL MEDICINE

## 2025-04-02 ASSESSMENT — SLEEP AND FATIGUE QUESTIONNAIRES
HOW LIKELY ARE YOU TO NOD OFF OR FALL ASLEEP WHILE LYING DOWN TO REST IN THE AFTERNOON WHEN CIRCUMSTANCES PERMIT: SLIGHT CHANCE OF DOZING
HOW LIKELY ARE YOU TO NOD OFF OR FALL ASLEEP IN A CAR, WHILE STOPPED FOR A FEW MINUTES IN TRAFFIC: WOULD NEVER DOZE
HOW LIKELY ARE YOU TO NOD OFF OR FALL ASLEEP WHILE SITTING QUIETLY AFTER LUNCH WITHOUT ALCOHOL: WOULD NEVER DOZE
HOW LIKELY ARE YOU TO NOD OFF OR FALL ASLEEP WHEN YOU ARE A PASSENGER IN A CAR FOR AN HOUR WITHOUT A BREAK: WOULD NEVER DOZE
ESS-CHAD TOTAL SCORE: 2
HOW LIKELY ARE YOU TO NOD OFF OR FALL ASLEEP WHILE SITTING AND READING: SLIGHT CHANCE OF DOZING
HOW LIKELY ARE YOU TO NOD OFF OR FALL ASLEEP WHILE SITTING AND TALKING TO SOMEONE: WOULD NEVER DOZE
SITING INACTIVE IN A PUBLIC PLACE LIKE A CLASS ROOM OR A MOVIE THEATER: WOULD NEVER DOZE
HOW LIKELY ARE YOU TO NOD OFF OR FALL ASLEEP WHILE WATCHING TV: WOULD NEVER DOZE

## 2025-04-02 ASSESSMENT — PAIN SCALES - GENERAL: PAINLEVEL_OUTOF10: 0-NO PAIN

## 2025-04-02 ASSESSMENT — PATIENT HEALTH QUESTIONNAIRE - PHQ9
2. FEELING DOWN, DEPRESSED OR HOPELESS: NOT AT ALL
1. LITTLE INTEREST OR PLEASURE IN DOING THINGS: NOT AT ALL
SUM OF ALL RESPONSES TO PHQ9 QUESTIONS 1 AND 2: 0

## 2025-04-02 ASSESSMENT — ENCOUNTER SYMPTOMS: DEPRESSION: 0

## 2025-04-02 NOTE — PROGRESS NOTES
[facility] SLEEP MEDICINE   FOLLOW-UP VISIT NOTE    PCP: MARITZA Sosa    HISTORY OF PRESENT ILLNESS     Patient ID: Maritza Garcia is a 55 y.o. female who presents for follow-up after sleep study    Patient is here alone today.  To review, patient's medical history is notable for obesity (BMI 33), atrial fibrillation, HTN, DM type II, GERD, seasonal allergies, ADHD, anxiety, depression, and CHANDLER      Interval History  Patient was last seen in 1/23/2025. Plan was to do titration study to evaluate effective pressure settings.  Since last visit, patient had completed sleep study which showed better patient tolerance on CPAP than BPAP.    RLS Follow-up:   Denies    SLEEP STUDY HISTORY (personally reviewed raw data such as interpretation report, data sheet, hypnogram, and titration table if available and applicable)  HSAT 5/9/2024: AHI3% 44.2 (Supine AHI3% 59.8, non-supine AHI3% 14), AHI4% 41.5 (supine AHI4% 56.6, non-supine AHI4% 12.3), SpO2 jones 67.3%, spent 159.4 mins with SpO2<89%   PAP titration 2/28/2025: CPAP was started at 5-15 cm H2O with Flex 2.     SLEEP-WAKE SCHEDULE  Bedtime:  9 PM to 9:30 PM daily  Subjective sleep latency: less than 5 minutes  Difficulty falling asleep: No  Number of awakenings: once per night to go to bathroom  Falls back asleep right away  Final wake time:  6 AM on weekdays, 7:30 AM on weekends  Out of bed time:  6 AM on weekdays, 8 AM on weekends  Shift work: No  Naps: once a week and last 30 min to one hour  Feels rested after a nap: Yes sometimes  Average sleep duration (excluding naps): 8 hours     SLEEP ENVIRONMENT  Sleep location: bed  Sleep status: sleeps with  and pet cat  Preferred sleep position: back and side    SOCIAL HISTORY  Smoking: no  ETOH: one glass of wine a week  Marijuana: no  Caffeine: two cups of coffee in the morning and one cup of tea in the afternoon  Sleep aids: yes on trazodone    WEIGHT: lost 9 lbs in 3 months      Claustrophobia: No     REVIEW OF SYSTEMS     All other systems have been reviewed and are negative.    ALLERGIES     Allergies   Allergen Reactions   • Lisinopril Anaphylaxis   • Ace Inhibitors Swelling   • Cat Hair Standardized Allergenic Extract Unknown     Congested       MEDICATIONS     Current Outpatient Medications   Medication Sig Dispense Refill   • albuterol 90 mcg/actuation inhaler Inhale 2 puffs into the lungs every 4-6 hours as needed for Wheezing or Shortness of Breath 18 g 11   • apixaban (Eliquis) 5 mg tablet Take 1 tablet (5 mg) by mouth every 12 hours. 180 tablet 3   • atorvastatin (Lipitor) 20 mg tablet Take 1 tablet by mouth daily 90 tablet 3   • blood-glucose sensor (FreeStyle Ramesh 3 Plus Sensor) device Use to check blood sugar 4 times daily. Change sensor every 14 days 4 each 3   • escitalopram (Lexapro) 20 mg tablet Take 1.5 tablets (30 mg) by mouth once daily. 135 tablet 1   • flecainide (Tambocor) 50 mg tablet Take 1 tablet (50 mg) by mouth 2 times a day. 180 tablet 1   • fluticasone (Flonase) 50 mcg/actuation nasal spray use 2 sprays into the nostril(s) daily 48 g 3   • gabapentin (Neurontin) 100 mg capsule Take 2 capsules by mouth nightly. 180 capsule 3   • metoprolol succinate XL (Toprol-XL) 25 mg 24 hr tablet Take 0.5 tablets (12.5 mg) by mouth once daily. Do not crush or chew. 45 tablet 3   • multivitamin tablet Take by mouth.     • semaglutide (Ozempic) 2 mg/dose (8 mg/3 mL) pen injector Inject 2 mg under the skin every 7 days. 3 mL 1   • traZODone (Desyrel) 150 mg tablet Take 1 tablet by mouth nightly 90 tablet 3   • triamterene-hydrochlorothiazid (Maxzide-25) 37.5-25 mg tablet Take 1 tablet by mouth daily 90 tablet 3     No current facility-administered medications for this visit.       Active Problems, Allergy List, Medication List, and PMH/PSH/FH/Social Hx have been reviewed and reconciled in chart. No significant changes unless documented in the pertinent chart section.  "Updates made when necessary.       PHYSICAL EXAM     VITAL SIGNS: /82   Pulse 60   Resp 16   Wt 108 kg (237 lb 4.8 oz)   SpO2 95%   BMI 32.18 kg/m²     Today ESS: 2  Last visit ESS: 4  Last visit INDER: 4    Physical Exam  Constitutional: Awake, not in distress  Head: Normocephalic, atraumatic  Skin: Warm, no rash  Neuro: No tremors, moves all extremities  Psych: alert and oriented to time, place, and person    RESULTS/DATA     No results found for: \"IRON\", \"TRANSFERRIN\", \"IRONSAT\", \"TIBC\", \"FERRITIN\"    CARBON DIOXIDE   Date Value Ref Range Status   02/26/2025 29 20 - 32 mmol/L Final       ASSESSMENT/PLAN     Maritza Garcia is a 55 y.o. female who returns in [facility] for the following problems:    OBSTRUCTIVE SLEEP APNEA, SEVERE positional (HSAT AHI3% 44.2, AHI4% 41.5)  SLEEP-RELATED HYPOXEMIA  - Personally reviewed the sleep study's raw data such as interpretation report, data sheet, and hypnogram. Discussed sleep study results with patient today. A copy of recent testing was either given to patient or released in [employer]. See HPI for detailed summary of sleep study results.  - Recommend starting auto-CPAP 13-20 cm H2O with EPR 2, heated humidification, heated tubings, and mask used in sleep lab (Resmed Airfit P10 nasal pillows). Orders sent to [employer].  - Discussed 30-day mask guarantee and insurance requirement regarding PAP compliance and follow-up.   - Advised about cleaning instructions and replacement schedule of PAP accessories.  - Supportive management as follows: Lose weight. Stay off your back when sleeping. Avoid smoking, alcohol, and sedating medications if applicable. Don't drive when sleepy.    SEASONAL ALLERGIES  - Continue fluticasone nasal spray 2 sprays per nostril once daily 1 hour before bedtime.  - If there is inadequate or lack of improvement on the above intranasal steroid spray, consider adding Azelastine nasal spray, 2 sprays " per nostril once daily in the morning.   - Alternatively, may add cetirizine or loratadine 10 mg once daily.    OBESITY   - Recommend weight loss with diet and exercise.  - Weight loss can help in long term management of CHANDLER.  - Defer management to PCP.    HYPERTENSION  - BP stable today.  - Untreated  or inadequately treated sleep apnea can lead to new onset hypertension, resistant hypertension, or refractory hypertension.  - Continue anti-hypertensive medications per PCP.  - Supportive management: low salt DASH diet (less than 2000 mg sodium intake daily), moderate intensity aerobic exercise at least 30 minutes 5 days per week, reduce stress, quit smoking, limit alcohol, lose weight, and monitor BP once daily  - PCP following. Defer management to PCP.    ATRIAL FIBRILLATION, PAROXYSMAL  - currently in sinus rhythm  - Continue meds per Cardio.  - Cardio following. Defer management to Cardio.    Follow-up in 31-90 days after getting new machine.      All of patient's questions were answered. She verbalizes understanding and agreement with my assessment and plan. Refer to after-visit-summary (AVS) for patient education and if applicable, for more details on troubleshooting issues with PAP usage, proper cleaning instructions of PAP supplies, and usual recommended replacement schedule for each of the PAP supplies.

## 2025-04-05 ENCOUNTER — PHARMACY VISIT (OUTPATIENT)
Dept: PHARMACY | Facility: CLINIC | Age: 55
End: 2025-04-05
Payer: COMMERCIAL

## 2025-04-05 PROCEDURE — RXMED WILLOW AMBULATORY MEDICATION CHARGE

## 2025-04-07 DIAGNOSIS — I48.0 PAROXYSMAL ATRIAL FIBRILLATION (MULTI): ICD-10-CM

## 2025-04-07 PROCEDURE — RXMED WILLOW AMBULATORY MEDICATION CHARGE

## 2025-04-09 PROBLEM — I10 HIGH BLOOD PRESSURE: Status: RESOLVED | Noted: 2024-03-12 | Resolved: 2025-04-09

## 2025-04-15 ENCOUNTER — PHARMACY VISIT (OUTPATIENT)
Dept: PHARMACY | Facility: CLINIC | Age: 55
End: 2025-04-15
Payer: COMMERCIAL

## 2025-04-21 ENCOUNTER — APPOINTMENT (OUTPATIENT)
Dept: CARDIOLOGY | Facility: HOSPITAL | Age: 55
End: 2025-04-21
Payer: COMMERCIAL

## 2025-04-21 VITALS
HEIGHT: 72 IN | BODY MASS INDEX: 32.1 KG/M2 | DIASTOLIC BLOOD PRESSURE: 88 MMHG | SYSTOLIC BLOOD PRESSURE: 122 MMHG | HEART RATE: 59 BPM | WEIGHT: 237 LBS

## 2025-04-21 DIAGNOSIS — Z79.01 CURRENT USE OF LONG TERM ANTICOAGULATION: ICD-10-CM

## 2025-04-21 DIAGNOSIS — Z79.899 LONG TERM CURRENT USE OF ANTIARRHYTHMIC MEDICAL THERAPY: Primary | ICD-10-CM

## 2025-04-21 DIAGNOSIS — I48.0 PAROXYSMAL ATRIAL FIBRILLATION (MULTI): ICD-10-CM

## 2025-04-21 PROCEDURE — 3008F BODY MASS INDEX DOCD: CPT | Performed by: NURSE PRACTITIONER

## 2025-04-21 PROCEDURE — 3079F DIAST BP 80-89 MM HG: CPT | Performed by: NURSE PRACTITIONER

## 2025-04-21 PROCEDURE — 93005 ELECTROCARDIOGRAM TRACING: CPT | Performed by: NURSE PRACTITIONER

## 2025-04-21 PROCEDURE — 1036F TOBACCO NON-USER: CPT | Performed by: NURSE PRACTITIONER

## 2025-04-21 PROCEDURE — 99214 OFFICE O/P EST MOD 30 MIN: CPT | Performed by: NURSE PRACTITIONER

## 2025-04-21 PROCEDURE — 93010 ELECTROCARDIOGRAM REPORT: CPT | Performed by: INTERNAL MEDICINE

## 2025-04-21 PROCEDURE — 99214 OFFICE O/P EST MOD 30 MIN: CPT | Mod: 25 | Performed by: NURSE PRACTITIONER

## 2025-04-21 PROCEDURE — 3074F SYST BP LT 130 MM HG: CPT | Performed by: NURSE PRACTITIONER

## 2025-04-21 RX ORDER — FLECAINIDE ACETATE 50 MG/1
50 TABLET ORAL 2 TIMES DAILY
Qty: 180 TABLET | Refills: 1 | Status: SHIPPED | OUTPATIENT
Start: 2025-04-21 | End: 2025-10-18

## 2025-04-21 ASSESSMENT — ENCOUNTER SYMPTOMS
OCCASIONAL FEELINGS OF UNSTEADINESS: 0
LOSS OF SENSATION IN FEET: 0
DEPRESSION: 0

## 2025-04-21 NOTE — PROGRESS NOTES
Electrophysiology Follow up Visit    History of Present Illness:  Maritza Garcia is a 55 y.o. year old female patient with atrial fibrillation, hypertension, diabetes, GERD, ADHD, CHANDLER, depression and anxiety.    Patient was initially referred to Dr. Salmon in April 2024 for atrial fibrillation.  Initial episode of atrial fibrillation was December 2023 which she converted after few doses of IV metoprolol.  She had a reoccurrence in March 2024 and again spontaneously converted.  Episodes lasted 3 to 5 hours and associated with shortness of breath and chest discomfort.  She was started on flecainide at least for the short-term, hoping lifestyle modifications including alcohol cessation and possible treatment for CHANDLER could reduce options.    Patient here for follow-up for atrial fibrillation and flecainide monitoring. Patient is feeling well and continues to exercise with peloton, kickboxing, and yoga. She has felt brief palpitations lasting a couple of seconds. She is compliant with daily medications and denies any bleeding concerns on OAC.     Medical History:  She has a past medical history of ADHD (attention deficit hyperactivity disorder) (1995), Allergic, Anxiety, Atrial fibrillation (Multi) (12/24/2023), Diabetes mellitus (Multi) (July 2023), Eczema, Hypertension, Varicella, and Visual impairment.    Surgical History:  She has a past surgical history that includes Blanco tooth extraction and Cholecystectomy.     Social History:  She reports that she has quit smoking. Her smoking use included cigarettes. She has a 3 pack-year smoking history. She has never used smokeless tobacco. She reports current alcohol use of about 2.0 standard drinks of alcohol per week. She reports that she does not use drugs.       Family History[1]     ROS:  A 14 point review of systems was done and is negative other than as stated in HPI    Physical Exam  Constitutional:       Appearance: Normal appearance.   Cardiovascular:       Rate and Rhythm: Normal rate and regular rhythm.      Pulses: Normal pulses.      Heart sounds: Normal heart sounds.   Pulmonary:      Effort: Pulmonary effort is normal.      Breath sounds: Normal breath sounds.   Musculoskeletal:         General: Normal range of motion.      Right lower leg: No edema.      Left lower leg: No edema.   Skin:     General: Skin is warm and dry.   Neurological:      Mental Status: She is alert and oriented to person, place, and time.   Psychiatric:         Mood and Affect: Mood normal.       Allergies:  Lisinopril, Ace inhibitors, and Cat hair standardized allergenic extract    Outpatient Medications:  Current Outpatient Medications   Medication Instructions    albuterol 90 mcg/actuation inhaler Inhale 2 puffs into the lungs every 4-6 hours as needed for Wheezing or Shortness of Breath    atorvastatin (Lipitor) 20 mg tablet Take 1 tablet by mouth daily    blood-glucose sensor (FreeStyle Ramesh 3 Plus Sensor) device Use to check blood sugar 4 times daily. Change sensor every 14 days    Eliquis 5 mg, oral, Every 12 hours    escitalopram (LEXAPRO) 30 mg, oral, Daily    flecainide (TAMBOCOR) 50 mg, oral, 2 times daily    fluticasone (Flonase) 50 mcg/actuation nasal spray use 2 sprays into the nostril(s) daily    gabapentin (Neurontin) 100 mg capsule Take 2 capsules by mouth nightly.    metoprolol succinate XL (TOPROL-XL) 12.5 mg, oral, Daily, Do not crush or chew.    multivitamin tablet Take by mouth.    Ozempic 2 mg, subcutaneous, Every 7 days    traZODone (Desyrel) 150 mg tablet Take 1 tablet by mouth nightly    triamterene-hydrochlorothiazid (Maxzide-25) 37.5-25 mg tablet Take 1 tablet by mouth daily         Reviewed Labs:  CBC  Lab Results   Component Value Date    WBC 4.5 02/26/2025    HGB 14.2 02/26/2025    HCT 42.7 02/26/2025    MCV 92.0 02/26/2025     02/26/2025        Renal Function Panel  Lab Results   Component Value Date    GLUCOSE 106 (H) 02/26/2025     02/26/2025     K 4.1 02/26/2025     02/26/2025    CO2 29 02/26/2025    ANIONGAP 8 02/26/2025    BUN 8 02/26/2025    CREATININE 1.07 (H) 02/26/2025    CALCIUM 9.1 02/26/2025        CMP  Lab Results   Component Value Date    CALCIUM 9.1 02/26/2025    PHOS 2.7 03/16/2024    PROT 6.5 02/26/2025    ALBUMIN 4.1 02/26/2025    AST 20 02/26/2025    ALT 21 02/26/2025    ALKPHOS 41 02/26/2025    BILITOT 0.5 02/26/2025        Mg   Lab Results   Component Value Date    MG 1.87 03/16/2024        Thyroid  Lab Results   Component Value Date    TSH 0.86 02/26/2025        Visit Vitals  /88 (BP Location: Left arm)   Pulse 59   Ht 1.829 m (6')   Wt 108 kg (237 lb)   BMI 32.14 kg/m²   OB Status Postmenopausal   Smoking Status Former   BSA 2.34 m²        Cardiac Testing Reviewed Study(s):  Echo (February/2024):   CONCLUSIONS:   1. Left ventricular systolic function is normal with a 65-70% estimated ejection fraction.   2. Spectral Doppler shows an impaired relaxation pattern of left ventricular diastolic filling.   3. RVSP within normal limits.  ECGs (reviewed and my interpretation):   10/21/2024 sinus bradycardia with rate of 48 bpm with VT 174ms  4/21/2025 sinus rhythm with rate of 59 bpm, VT 214ms, QRS 90ms    Assessment  Atrial fibrillation:  Initial episode was December 2023 and she spontaneously converted after IV metoprolol  Flecainide was started in April 2024  due to multiple symptomatic episodes.  Encourage lifestyle modifications alcohol cessation and evaluation for sleep apnea  Patient continued on OAC due to elevated [id number] score of 3 (htn, dm, gender)  Metoprolol reduced due to bradycardia    ECG today shows sinus rhythm with acceptable QRS.  Labs reviewed and acceptable.  Patient maintaining sinus rhythm on flecainide.  Reviewed lifestyle modifications. She followed up with sleep medicine and is awaiting CPAP machine. She continues to exercise, BP is well controlled, and 1 glass of wine/week. We discussed stopping  flecainide to see if she would have a recurrence as she has modified her lifestyle. She prefers to wait till next visit as she will have started her CPAP by then which is reasonable. We will continue flecainide, metoprolol, and Eliqui. Follow up in 6 months with repeat ECG to monitor UT and QRS.     Hypertension:  Controlled  Continue metoprolol    CHANDLER:  Has followed up with sleep medicine and awaiting CPAP machine     Plan  Continue flecainide 50mg twice daily  Continue metoprolol 12.5mg daily  Continue Eliquis twice daily  Follow up in 6 months      Exclusive of any other services or procedures performed, Elin JORDAN, CURT-CNP , spent 30 minutes in duration for this visit today.  This time consisted of chart review, obtaining history, and/or performing the exam as documented above as well as documenting the clinical information for the encounter in the electronic record, discussing treatment options, plans, and/or goals with patient, family, and/or caregiver, refilling medications, updating the electronic record, ordering medicines, lab work, imaging, referrals, and/or procedures as documented above and communicating with other [facility] professionals. I have discussed the results of laboratory, radiology, and cardiology studies with the patient and their family/caregiver.          [1]   Family History  Problem Relation Name Age of Onset    Hypertension Mother Libby     Heart disease Mother Libby     Abnormal EKG Father Romain     Arrhythmia Father Romain     Asthma Father Romain     Atrial fibrillation Father Romain     Diabetes type II Father Romain     Hypertension Father Romain     Diabetes Father Romain     Heart disease Father Romain     Hyperlipidemia Brother Dilip     Sleep apnea Son      Hypertension Maternal Grandmother Zhanna     Thyroid disease Maternal Grandmother Zhanna     Cancer Maternal Grandfather Palomo     Lung disease Maternal Grandfather Palomo     Alcohol abuse Maternal Grandfather Palomo     Hypertension  Paternal Grandmother Kesha     Heart disease Paternal Grandmother Kesha     Cancer Paternal Grandfather Lj     Diabetes type II Paternal Grandfather Lj     Alcohol abuse Paternal Grandfather Lj     Diabetes Paternal Grandfather Lj

## 2025-04-22 LAB
ATRIAL RATE: 59 BPM
P AXIS: 61 DEGREES
P OFFSET: 177 MS
P ONSET: 114 MS
PR INTERVAL: 214 MS
Q ONSET: 221 MS
QRS COUNT: 10 BEATS
QRS DURATION: 90 MS
QT INTERVAL: 420 MS
QTC CALCULATION(BAZETT): 415 MS
QTC FREDERICIA: 417 MS
R AXIS: 84 DEGREES
T AXIS: 80 DEGREES
T OFFSET: 431 MS
VENTRICULAR RATE: 59 BPM

## 2025-05-02 NOTE — PROGRESS NOTES
[facility] ([facility])  Diabetes    Maritza Garcia is a 55 y.o. female was referred to Clinical Pharmacy Team for diabetes management.     Referring Provider: Mayda Roper AP*  - Last visit with referring provider: 12/9/24    Subjective     HPI    Current Diabetes Pharmacotherapy:    - Ozempic 2 mg weekly - Sunday     Social History:  Current diet:   - B: smoothie, earl rustam delights, fruit  - L: italian small plate; pepperoni and cheese/crackers; apple/grapes  - D: does not eat bread; Wendys salad  - Some chocolate during the holiday  - Water  - Feeling decrease appetite/portion sizes    Current exercise:   - Gym 3 days a week  - Yoga    Weight:  - Baseline: 248 lbs  - Weight last appt: 233.8 lbs  - Current weight: 230.2 lbs    Current monitoring regimen:   Patient is using: continuous glucose monitor  Type of CGM: Ramesh 3 plus - phone     Reported blood sugars:   See APG report below    Any episodes of hypoglycemia? No    Adverse Effects: Constipation, gas (uses Miralax/fiber gummy daily)          Objective     There were no vitals taken for this visit.    Allergies   Allergen Reactions    Lisinopril Anaphylaxis    Ace Inhibitors Swelling    Cat Hair Standardized Allergenic Extract Unknown     Congested       Historical Diabetes Pharmacotherapy:  - Metformin (controlled A1c)    SECONDARY PREVENTION  - Statin? Yes   - ACE-I/ARB? No  - Aspirin? No    Pertinent PMH Review:  - PMH of Pancreatitis: No  - PMH of Retinopathy: No  - PMH of Urinary Tract Infections: No  - PMH of Yeast Infections: No  - PMH of MTC: No    Lab Review  Lab Results   Component Value Date    BILITOT 0.5 02/26/2025    CALCIUM 9.1 02/26/2025    CO2 29 02/26/2025     02/26/2025    CREATININE 1.07 (H) 02/26/2025    GLUCOSE 106 (H) 02/26/2025    ALKPHOS 41 02/26/2025    K 4.1 02/26/2025    PROT 6.5 02/26/2025     02/26/2025    AST 20 02/26/2025    ALT 21 02/26/2025    BUN 8 02/26/2025     ANIONGAP 8 02/26/2025    MG 1.87 03/16/2024    PHOS 2.7 03/16/2024    ALBUMIN 4.1 02/26/2025     Lab Results   Component Value Date    TRIG 140 02/26/2025    CHOL 123 02/26/2025    HDL 43 (L) 02/26/2025     Lab Results   Component Value Date    HGBA1C 5.6 02/26/2025    HGBA1C 5.9 (H) 10/08/2024    HGBA1C 6.5 (H) 07/01/2024     The ASCVD Risk score (Tre VIVEROS, et al., 2019) failed to calculate for the following reasons:    The valid total cholesterol range is 130 to 320 mg/dL    Drug Interactions:  - None     Affordability/Accessibility:  - CGM = $75/mo.    Preferred Pharmacy:  - [facility]    Assessment/Plan   Problem List Items Addressed This Visit       Type 2 diabetes mellitus without complication, without long-term current use of insulin    Relevant Medications    tirzepatide (Mounjaro) 5 mg/0.5 mL pen injector    Other Relevant Orders    Referral to Clinical Pharmacy       ASSESSMENT:  Patients diabetes is controlled with most recent A1c of 5.6%.     Patient has lost 3 lb on Ozempic 2 mg but states constipation is an issue as well as gas. Has been using miralax and fiber gummy daily to help. She is interested in trying Mounjaro 5 mg at this time. Will send in script.    PLAN:  STOP Ozempic  START Mounjaro 5 mg weekly  Follow up with clinical pharmacist: 5/29/25 @ 11:20   Follow up with PCP: 9/15/25    Thank you,  Feli Ortiz, PharmD  Clinical Pharmacy Specialist  [phone]  [email or web address]     Continue all meds under the continuation of care with the referring provider and clinical pharmacy team.

## 2025-05-05 ENCOUNTER — APPOINTMENT (OUTPATIENT)
Dept: PHARMACY | Facility: HOSPITAL | Age: 55
End: 2025-05-05
Payer: COMMERCIAL

## 2025-05-05 DIAGNOSIS — E11.9 TYPE 2 DIABETES MELLITUS WITHOUT COMPLICATION, WITHOUT LONG-TERM CURRENT USE OF INSULIN: ICD-10-CM

## 2025-05-05 PROCEDURE — RXMED WILLOW AMBULATORY MEDICATION CHARGE

## 2025-05-05 RX ORDER — TIRZEPATIDE 5 MG/.5ML
5 INJECTION, SOLUTION SUBCUTANEOUS
Qty: 2 ML | Refills: 1 | Status: SHIPPED | OUTPATIENT
Start: 2025-05-05

## 2025-05-09 ENCOUNTER — PHARMACY VISIT (OUTPATIENT)
Dept: PHARMACY | Facility: CLINIC | Age: 55
End: 2025-05-09
Payer: COMMERCIAL

## 2025-05-23 ENCOUNTER — APPOINTMENT (OUTPATIENT)
Dept: SLEEP MEDICINE | Facility: CLINIC | Age: 55
End: 2025-05-23
Payer: COMMERCIAL

## 2025-05-27 NOTE — PROGRESS NOTES
[facility] ([facility])  Diabetes    Maritza Garcia is a 55 y.o. female was referred to Clinical Pharmacy Team for diabetes management.     Referring Provider: Mayda Roper AP*  - Last visit with referring provider: 3/10/25    Subjective     HPI    Current Diabetes Pharmacotherapy:    - Mounjaro 5 mg weekly - Sundays (3 doses so far)    Social History:  Current diet:   - B: smoothie, earl rustamayesha mahmood, fruit  - L: italian small plate; pepperoni and cheese/crackers; apple/grapes  - D: does not eat bread; Wendys salad  - Some chocolate during the holiday  - Water  - Feeling decrease appetite/portion sizes    Current exercise:   - Gym 3 days a week  - Yoga    Weight:  - Baseline: 248 lbs  - Weight last appt: 230.2 lbs  - Current weight: 228 lbs    Current monitoring regimen:   Patient is using: continuous glucose monitor  Type of CGM: Feedtrace 3 plus - phone     Reported blood sugars:   See APG report below    Any episodes of hypoglycemia? No    Adverse Effects: Some constipation/gas but has gotten better since switching to Mounjaro           Objective     There were no vitals taken for this visit.    Allergies   Allergen Reactions    Lisinopril Anaphylaxis    Ace Inhibitors Swelling    Cat Hair Standardized Allergenic Extract Unknown     Congested       Historical Diabetes Pharmacotherapy:  - Metformin (controlled A1c)    SECONDARY PREVENTION  - Statin? Yes   - ACE-I/ARB? No  - Aspirin? No    Pertinent PMH Review:  - PMH of Pancreatitis: No  - PMH of Retinopathy: No  - PMH of Urinary Tract Infections: No  - PMH of Yeast Infections: No  - PMH of MTC: No    Lab Review  Lab Results   Component Value Date    BILITOT 0.5 02/26/2025    CALCIUM 9.1 02/26/2025    CO2 29 02/26/2025     02/26/2025    CREATININE 1.07 (H) 02/26/2025    GLUCOSE 106 (H) 02/26/2025    ALKPHOS 41 02/26/2025    K 4.1 02/26/2025    PROT 6.5 02/26/2025     02/26/2025    AST 20 02/26/2025    ALT  21 02/26/2025    BUN 8 02/26/2025    ANIONGAP 8 02/26/2025    MG 1.87 03/16/2024    PHOS 2.7 03/16/2024    ALBUMIN 4.1 02/26/2025     Lab Results   Component Value Date    TRIG 140 02/26/2025    CHOL 123 02/26/2025    HDL 43 (L) 02/26/2025     Lab Results   Component Value Date    HGBA1C 5.6 02/26/2025    HGBA1C 5.9 (H) 10/08/2024    HGBA1C 6.5 (H) 07/01/2024     The ASCVD Risk score (Tre DK, et al., 2019) failed to calculate for the following reasons:    The valid total cholesterol range is 130 to 320 mg/dL    Drug Interactions:  - None     Affordability/Accessibility:  - CGM = $75/mo.    Preferred Pharmacy:  - [facility]    Assessment/Plan   Problem List Items Addressed This Visit       Type 2 diabetes mellitus without complication, without long-term current use of insulin    Relevant Medications    tirzepatide (Mounjaro) 7.5 mg/0.5 mL pen injector    Other Relevant Orders    Referral to Clinical Pharmacy     ASSESSMENT:  Patients diabetes is controlled with most recent A1c of 5.6%.     Switched from Ozempic to Mounjaro last visit. Side effects are better with this switch. Lost only 2 lbs. We will increase dose to 7.5 mg.    PLAN:  INCREASE Mounjaro to 7.5 mg weekly  Follow up with clinical pharmacist: 6/26/25 @ 11:20   Follow up with PCP: 9/15/25    Thank you,  Feli Ortiz, PharmD  Clinical Pharmacy Specialist  [phone]  [email or web address]     Continue all meds under the continuation of care with the referring provider and clinical pharmacy team.

## 2025-05-29 ENCOUNTER — APPOINTMENT (OUTPATIENT)
Dept: PHARMACY | Facility: HOSPITAL | Age: 55
End: 2025-05-29
Payer: COMMERCIAL

## 2025-05-29 DIAGNOSIS — E11.9 TYPE 2 DIABETES MELLITUS WITHOUT COMPLICATION, WITHOUT LONG-TERM CURRENT USE OF INSULIN: ICD-10-CM

## 2025-05-29 PROCEDURE — RXMED WILLOW AMBULATORY MEDICATION CHARGE

## 2025-05-29 RX ORDER — TIRZEPATIDE 7.5 MG/.5ML
7.5 INJECTION, SOLUTION SUBCUTANEOUS
Qty: 2 ML | Refills: 1 | Status: SHIPPED | OUTPATIENT
Start: 2025-05-29

## 2025-05-31 ENCOUNTER — PHARMACY VISIT (OUTPATIENT)
Dept: PHARMACY | Facility: CLINIC | Age: 55
End: 2025-05-31
Payer: COMMERCIAL

## 2025-06-12 ENCOUNTER — PHARMACY VISIT (OUTPATIENT)
Dept: PHARMACY | Facility: CLINIC | Age: 55
End: 2025-06-12
Payer: COMMERCIAL

## 2025-06-12 DIAGNOSIS — I48.0 PAROXYSMAL ATRIAL FIBRILLATION (MULTI): ICD-10-CM

## 2025-06-12 PROCEDURE — RXMED WILLOW AMBULATORY MEDICATION CHARGE

## 2025-06-12 RX ORDER — METOPROLOL SUCCINATE 25 MG/1
12.5 TABLET, EXTENDED RELEASE ORAL DAILY
Qty: 45 TABLET | Refills: 3 | Status: SHIPPED | OUTPATIENT
Start: 2025-06-12 | End: 2026-06-12

## 2025-06-25 NOTE — PROGRESS NOTES
[facility] ([facility])  Diabetes    Maritza Garcia is a 55 y.o. female was referred to Clinical Pharmacy Team for diabetes management.     Referring Provider: Mayda Roper AP*  - Last visit with referring provider: 3/10/25    Subjective     HPI    Current Diabetes Pharmacotherapy:    - Mounjaro 7.5 mg weekly - Sundays (4 doses so far)    Social History:  Current diet:   - B: smoothie, earl mahmood, fruit  - L: italian small plate; pepperoni and cheese/crackers; apple/grapes  - D: does not eat bread; Wendys salad  - Some chocolate during the holiday  - Water  - Feeling decrease appetite/portion sizes    Current exercise:   - Gym 3 days a week  - Yoga    Weight:  - Baseline: 248 lbs  - Weight last appt: 228 lbs  - Current weight: 226.2 lbs    Current monitoring regimen:   Patient is using: continuous glucose monitor  Type of CGM: Ramesh 3 plus - phone     Reported blood sugars:   See APG report below    Any episodes of hypoglycemia? No    Adverse Effects: Some constipation/gas but has gotten better since switching to Mounjaro       - has not wore her ramesh      Objective     There were no vitals taken for this visit.    Allergies   Allergen Reactions    Lisinopril Anaphylaxis    Ace Inhibitors Swelling    Cat Hair Standardized Allergenic Extract Unknown     Congested       Historical Diabetes Pharmacotherapy:  - Metformin (controlled A1c)    SECONDARY PREVENTION  - Statin? Yes   - ACE-I/ARB? No  - Aspirin? No    Pertinent PMH Review:  - PMH of Pancreatitis: No  - PMH of Retinopathy: No  - PMH of Urinary Tract Infections: No  - PMH of Yeast Infections: No  - PMH of MTC: No    Lab Review  Lab Results   Component Value Date    BILITOT 0.5 02/26/2025    CALCIUM 9.1 02/26/2025    CO2 29 02/26/2025     02/26/2025    CREATININE 1.07 (H) 02/26/2025    GLUCOSE 106 (H) 02/26/2025    ALKPHOS 41 02/26/2025    K 4.1 02/26/2025    PROT 6.5 02/26/2025     02/26/2025     AST 20 02/26/2025    ALT 21 02/26/2025    BUN 8 02/26/2025    ANIONGAP 8 02/26/2025    MG 1.87 03/16/2024    PHOS 2.7 03/16/2024    ALBUMIN 4.1 02/26/2025     Lab Results   Component Value Date    TRIG 140 02/26/2025    CHOL 123 02/26/2025    HDL 43 (L) 02/26/2025     Lab Results   Component Value Date    HGBA1C 5.6 02/26/2025    HGBA1C 5.9 (H) 10/08/2024    HGBA1C 6.5 (H) 07/01/2024     The ASCVD Risk score (Tre VIVEROS, et al., 2019) failed to calculate for the following reasons:    The valid total cholesterol range is 130 to 320 mg/dL    Drug Interactions:  - None     Affordability/Accessibility:  - CGM = $75/mo.    Preferred Pharmacy:  -  [facility]    Assessment/Plan   Problem List Items Addressed This Visit       Type 2 diabetes mellitus without complication, without long-term current use of insulin    Relevant Medications    tirzepatide (Mounjaro) 10 mg/0.5 mL pen injector    Other Relevant Orders    Referral to Clinical Pharmacy     ASSESSMENT:  Patients diabetes is controlled with most recent A1c of 5.6%.     Patient is doing well on increased dose. No new side effects. Noticed to have only lost 2 lbs. Will trial increased in Mounjaro 10 mg at this time.     Patient also inquired for cheaper options for CGM. Ramesh is 74.99 through  wit coupon card. Dexcom showing not covered with insulin use. Stelo is more expensive than ramesh. Patient's diabetes is controlled with A1c 5.6%. Told patient at this time if cost is an issue she may just do FS whenever she presenting with hypoglycemic or hyperglycemic symptoms.     PLAN:  INCREASE Mounjaro to 10 mg weekly  Follow up with clinical pharmacist: 8/4/25 @ 3:20   Follow up with PCP: 9/15/25    Thank you,  Chen Shaikh, [facility] MED - PGY1 Resident     Continue all meds under the continuation of care with the referring provider and clinical pharmacy team.

## 2025-06-26 ENCOUNTER — APPOINTMENT (OUTPATIENT)
Dept: PHARMACY | Facility: HOSPITAL | Age: 55
End: 2025-06-26
Payer: COMMERCIAL

## 2025-06-26 DIAGNOSIS — E11.9 TYPE 2 DIABETES MELLITUS WITHOUT COMPLICATION, WITHOUT LONG-TERM CURRENT USE OF INSULIN: ICD-10-CM

## 2025-06-26 PROCEDURE — RXMED WILLOW AMBULATORY MEDICATION CHARGE

## 2025-06-26 RX ORDER — TIRZEPATIDE 10 MG/.5ML
10 INJECTION, SOLUTION SUBCUTANEOUS
Qty: 2 ML | Refills: 1 | Status: SHIPPED | OUTPATIENT
Start: 2025-06-26

## 2025-06-26 NOTE — PROGRESS NOTES
I reviewed the progress note and agree with the resident’s findings and plans as written. Case discussed with resident.    Feli Ortiz, BrendenD

## 2025-07-05 DIAGNOSIS — F32.A ANXIETY AND DEPRESSION: ICD-10-CM

## 2025-07-05 DIAGNOSIS — I10 ESSENTIAL HYPERTENSION: ICD-10-CM

## 2025-07-05 DIAGNOSIS — Z76.0 ENCOUNTER FOR MEDICATION REFILL: ICD-10-CM

## 2025-07-05 DIAGNOSIS — R20.2 PARESTHESIA: ICD-10-CM

## 2025-07-05 DIAGNOSIS — F41.9 ANXIETY AND DEPRESSION: ICD-10-CM

## 2025-07-05 RX ORDER — ESCITALOPRAM OXALATE 20 MG/1
30 TABLET ORAL DAILY
Qty: 135 TABLET | Refills: 1 | Status: CANCELLED | OUTPATIENT
Start: 2025-07-05 | End: 2026-01-01

## 2025-07-06 ENCOUNTER — PHARMACY VISIT (OUTPATIENT)
Dept: PHARMACY | Facility: CLINIC | Age: 55
End: 2025-07-06
Payer: COMMERCIAL

## 2025-07-06 PROCEDURE — RXMED WILLOW AMBULATORY MEDICATION CHARGE

## 2025-07-07 DIAGNOSIS — F41.9 ANXIETY AND DEPRESSION: ICD-10-CM

## 2025-07-07 DIAGNOSIS — F32.A ANXIETY AND DEPRESSION: ICD-10-CM

## 2025-07-07 RX ORDER — ESCITALOPRAM OXALATE 20 MG/1
30 TABLET ORAL DAILY
Qty: 135 TABLET | Refills: 1 | Status: CANCELLED | OUTPATIENT
Start: 2025-07-05 | End: 2026-01-01

## 2025-07-09 DIAGNOSIS — F41.9 ANXIETY AND DEPRESSION: ICD-10-CM

## 2025-07-09 DIAGNOSIS — F32.A ANXIETY AND DEPRESSION: ICD-10-CM

## 2025-07-09 RX ORDER — GABAPENTIN 100 MG/1
200 CAPSULE ORAL NIGHTLY
Qty: 180 CAPSULE | Refills: 0 | Status: SHIPPED | OUTPATIENT
Start: 2025-07-09 | End: 2026-07-09

## 2025-07-09 RX ORDER — TRIAMTERENE AND HYDROCHLOROTHIAZIDE 37.5; 25 MG/1; MG/1
1 TABLET ORAL DAILY
Qty: 90 TABLET | Refills: 0 | Status: SHIPPED | OUTPATIENT
Start: 2025-07-09 | End: 2026-07-09

## 2025-07-09 RX ORDER — ESCITALOPRAM OXALATE 20 MG/1
30 TABLET ORAL DAILY
Qty: 135 TABLET | Refills: 1 | Status: CANCELLED | OUTPATIENT
Start: 2025-07-05 | End: 2026-01-01

## 2025-07-09 NOTE — TELEPHONE ENCOUNTER
Name of Medication(s) Requested:  Requested Prescriptions     Pending Prescriptions Disp Refills    gabapentin (NEURONTIN) 100 MG capsule [Pharmacy Med Name: gabapentin 100 mg capsule (Neurontin)] 180 capsule 0     Sig: Take 2 capsules by mouth nightly.    triamterene-hydroCHLOROthiazide (MAXZIDE-25) 37.5-25 MG per tablet [Pharmacy Med Name: triamterene 37.5 mg-hydrochlorothiazide 25 mg tablet (Maxzide-25)] 90 tablet 0     Sig: Take 1 tablet by mouth daily       Medication is on current medication list Yes    Dosage and directions were verified? Yes    Quantity verified: 90 day supply     Pharmacy Verified?  Yes    Last Appointment:  10/9/2024    Future appts:  No future appointments.     (If no appt send self scheduling link. .REFILLAPPT)  Scheduling request sent?     [x] Yes  [] No    Does patient need updated?  [] Yes  [x] No

## 2025-07-10 PROCEDURE — RXMED WILLOW AMBULATORY MEDICATION CHARGE

## 2025-07-11 ENCOUNTER — PHARMACY VISIT (OUTPATIENT)
Dept: PHARMACY | Facility: CLINIC | Age: 55
End: 2025-07-11
Payer: COMMERCIAL

## 2025-07-14 DIAGNOSIS — F32.A ANXIETY AND DEPRESSION: ICD-10-CM

## 2025-07-14 DIAGNOSIS — F41.9 ANXIETY AND DEPRESSION: ICD-10-CM

## 2025-07-14 PROCEDURE — RXMED WILLOW AMBULATORY MEDICATION CHARGE

## 2025-07-14 RX ORDER — ESCITALOPRAM OXALATE 20 MG/1
30 TABLET ORAL DAILY
Qty: 135 TABLET | Refills: 0 | Status: SHIPPED | OUTPATIENT
Start: 2025-07-14 | End: 2025-10-13

## 2025-07-15 ENCOUNTER — PHARMACY VISIT (OUTPATIENT)
Dept: PHARMACY | Facility: CLINIC | Age: 55
End: 2025-07-15
Payer: COMMERCIAL

## 2025-08-01 PROCEDURE — RXMED WILLOW AMBULATORY MEDICATION CHARGE

## 2025-08-04 ENCOUNTER — APPOINTMENT (OUTPATIENT)
Dept: PHARMACY | Facility: HOSPITAL | Age: 55
End: 2025-08-04
Payer: COMMERCIAL

## 2025-08-04 ENCOUNTER — PHARMACY VISIT (OUTPATIENT)
Dept: PHARMACY | Facility: CLINIC | Age: 55
End: 2025-08-04
Payer: COMMERCIAL

## 2025-08-04 DIAGNOSIS — R11.0 NAUSEA: ICD-10-CM

## 2025-08-04 DIAGNOSIS — E11.9 TYPE 2 DIABETES MELLITUS WITHOUT COMPLICATION, WITHOUT LONG-TERM CURRENT USE OF INSULIN: Primary | ICD-10-CM

## 2025-08-04 PROCEDURE — RXMED WILLOW AMBULATORY MEDICATION CHARGE

## 2025-08-04 RX ORDER — TIRZEPATIDE 10 MG/.5ML
10 INJECTION, SOLUTION SUBCUTANEOUS
Qty: 2 ML | Refills: 1 | Status: SHIPPED | OUTPATIENT
Start: 2025-08-04

## 2025-08-04 RX ORDER — ONDANSETRON 4 MG/1
4 TABLET, ORALLY DISINTEGRATING ORAL EVERY 8 HOURS PRN
Qty: 20 TABLET | Refills: 1 | Status: SHIPPED | OUTPATIENT
Start: 2025-08-04 | End: 2025-09-03

## 2025-08-04 NOTE — PROGRESS NOTES
[facility] ([facility])  Diabetes    Maritza Garcia is a 55 y.o. female was referred to Clinical Pharmacy Team for diabetes management.     Referring Provider: Mayda Roper AP*  - Last visit with referring provider: 3/10/25    Subjective     HPI    Current Diabetes Pharmacotherapy:    - Mounjaro 10 mg weekly - Sundays     Social History:  Current diet:   - B: smoothie, earl rustam delights, fruit  - L: italian small plate; pepperoni and cheese/crackers; apple/grapes  - D: does not eat bread; Wend[employer] salad  - Some chocolate during the holiday  - Water  - Feeling decrease appetite/portion sizes    Current exercise:   - Gym 3 days a week  - Yoga    Weight:  - Baseline: 248 lbs  - Weight last appt: 226.2 lbs  - Current weight: 221.4 lbs    Current monitoring regimen:   Patient is using: continuous glucose monitor  Type of CGM: [employer] 3 plus - phone     Reported blood sugars:   See APG report below    Any episodes of hypoglycemia? No    Adverse Effects: Some nausea/queasy, doesn't last long        Objective     There were no vitals taken for this visit.    Allergies   Allergen Reactions    Lisinopril Anaphylaxis    Ace Inhibitors Swelling    Cat Hair Standardized Allergenic Extract Unknown     Congested       Historical Diabetes Pharmacotherapy:  - Metformin (controlled A1c)    SECONDARY PREVENTION  - Statin? Yes   - ACE-I/ARB? No  - Aspirin? No    Pertinent PMH Review:  - PMH of Pancreatitis: No  - PMH of Retinopathy: No  - PMH of Urinary Tract Infections: No  - PMH of Yeast Infections: No  - PMH of MTC: No    Lab Review  Lab Results   Component Value Date    BILITOT 0.5 02/26/2025    CALCIUM 9.1 02/26/2025    CO2 29 02/26/2025     02/26/2025    CREATININE 1.07 (H) 02/26/2025    GLUCOSE 106 (H) 02/26/2025    ALKPHOS 41 02/26/2025    K 4.1 02/26/2025    PROT 6.5 02/26/2025     02/26/2025    AST 20 02/26/2025    ALT 21 02/26/2025    BUN 8 02/26/2025    ANIONGAP 8  02/26/2025    MG 1.87 03/16/2024    PHOS 2.7 03/16/2024    ALBUMIN 4.1 02/26/2025     Lab Results   Component Value Date    TRIG 140 02/26/2025    CHOL 123 02/26/2025    HDL 43 (L) 02/26/2025     Lab Results   Component Value Date    HGBA1C 5.6 02/26/2025    HGBA1C 5.9 (H) 10/08/2024    HGBA1C 6.5 (H) 07/01/2024     The ASCVD Risk score (Tre VIVEROS, et al., 2019) failed to calculate for the following reasons:    The valid total cholesterol range is 130 to 320 mg/dL    Drug Interactions:  - None     Affordability/Accessibility:  - CGM = $75/mo.    Preferred Pharmacy:  - [facility]    Assessment/Plan   Problem List Items Addressed This Visit       Type 2 diabetes mellitus without complication, without long-term current use of insulin - Primary    Relevant Medications    tirzepatide (Mounjaro) 10 mg/0.5 mL pen injector    Other Relevant Orders    Referral to Clinical Pharmacy     Other Visit Diagnoses         Nausea        Relevant Medications    ondansetron ODT (Zofran-ODT) 4 mg disintegrating tablet            ASSESSMENT:  Patients diabetes is controlled with most recent A1c of 5.6%.     Patient doing well on Mounjaro 10 mg. Has lost additional 5 lbs. Having some nausea/queasiness here and there. Will send Zofran if she needs it. Otherwise, she just picked up refill so will have her continue on current dose. Blood sugars are way at goal and looking great. Will follow up in 2 months.    PLAN:  CONTINUE Mounjaro 10 mg weekly  Follow up with clinical pharmacist: 9/29/25 @ 11  Follow up with PCP: 9/15/25    Thank you,  Feli Ortiz, PharmD  Clinical Pharmacy Specialist - Primary Care  [phone]  [email or web address]  __________________________________________________________  Patient enrolled in  Employee diabetes program for $0 co-pays on diabetes medications/supplies. Enrollment should be active in 2-4 weeks and will be valid for one year contingent upon patient remaining on  prescription insurance  plan and filling at a  pharmacy. After 1 year, patient will require another consult with the clinical pharmacy team.   Requested [facility] enrollement date: 1/6/25  PharmD Management Level: Monthly   Pharmacy fill location:  [place]      Continue all meds under the continuation of care with the referring provider and clinical pharmacy team.

## 2025-08-13 ENCOUNTER — PHARMACY VISIT (OUTPATIENT)
Dept: PHARMACY | Facility: CLINIC | Age: 55
End: 2025-08-13
Payer: COMMERCIAL

## 2025-08-14 ENCOUNTER — OFFICE VISIT (OUTPATIENT)
Dept: SLEEP MEDICINE | Facility: CLINIC | Age: 55
End: 2025-08-14
Payer: COMMERCIAL

## 2025-08-14 VITALS — BODY MASS INDEX: 29.93 KG/M2 | WEIGHT: 221 LBS | HEIGHT: 72 IN

## 2025-08-14 DIAGNOSIS — G47.33 OSA ON CPAP: Primary | ICD-10-CM

## 2025-08-14 PROCEDURE — 99214 OFFICE O/P EST MOD 30 MIN: CPT | Performed by: INTERNAL MEDICINE

## 2025-08-14 PROCEDURE — 99214 OFFICE O/P EST MOD 30 MIN: CPT | Mod: 95 | Performed by: INTERNAL MEDICINE

## 2025-08-14 PROCEDURE — 3008F BODY MASS INDEX DOCD: CPT | Performed by: INTERNAL MEDICINE

## 2025-08-14 ASSESSMENT — SLEEP AND FATIGUE QUESTIONNAIRES
HOW LIKELY ARE YOU TO NOD OFF OR FALL ASLEEP WHILE SITTING INACTIVE IN A PUBLIC PLACE: WOULD NEVER DOZE
HOW LIKELY ARE YOU TO NOD OFF OR FALL ASLEEP WHILE WATCHING TV: SLIGHT CHANCE OF DOZING
ESS-CHAD TOTAL SCORE: 5
HOW LIKELY ARE YOU TO NOD OFF OR FALL ASLEEP WHILE SITTING AND READING: SLIGHT CHANCE OF DOZING
HOW LIKELY ARE YOU TO NOD OFF OR FALL ASLEEP WHILE SITTING AND READING: SLIGHT CHANCE OF DOZING
HOW LIKELY ARE YOU TO NOD OFF OR FALL ASLEEP WHILE LYING DOWN TO REST IN THE AFTERNOON WHEN CIRCUMSTANCES PERMIT: HIGH CHANCE OF DOZING
SITING INACTIVE IN A PUBLIC PLACE LIKE A CLASS ROOM OR A MOVIE THEATER: WOULD NEVER DOZE
HOW LIKELY ARE YOU TO NOD OFF OR FALL ASLEEP WHILE SITTING AND TALKING TO SOMEONE: WOULD NEVER DOZE
HOW LIKELY ARE YOU TO NOD OFF OR FALL ASLEEP IN A CAR, WHILE STOPPED FOR A FEW MINUTES IN TRAFFIC: WOULD NEVER DOZE
HOW LIKELY ARE YOU TO NOD OFF OR FALL ASLEEP WHILE LYING DOWN TO REST IN THE AFTERNOON WHEN CIRCUMSTANCES PERMIT: HIGH CHANCE OF DOZING
HOW LIKELY ARE YOU TO NOD OFF OR FALL ASLEEP WHILE SITTING QUIETLY AFTER LUNCH WITHOUT ALCOHOL: WOULD NEVER DOZE
HOW LIKELY ARE YOU TO NOD OFF OR FALL ASLEEP WHILE SITTING QUIETLY AFTER LUNCH WITHOUT ALCOHOL: WOULD NEVER DOZE
ESS TOTAL SCORE: 5
HOW LIKELY ARE YOU TO NOD OFF OR FALL ASLEEP IN A CAR, WHILE STOPPED FOR A FEW MINUTES IN TRAFFIC: WOULD NEVER DOZE
HOW LIKELY ARE YOU TO NOD OFF OR FALL ASLEEP WHEN YOU ARE A PASSENGER IN A CAR FOR AN HOUR WITHOUT A BREAK: WOULD NEVER DOZE
HOW LIKELY ARE YOU TO NOD OFF OR FALL ASLEEP WHILE SITTING AND TALKING TO SOMEONE: WOULD NEVER DOZE
HOW LIKELY ARE YOU TO NOD OFF OR FALL ASLEEP WHILE WATCHING TV: SLIGHT CHANCE OF DOZING
HOW LIKELY ARE YOU TO NOD OFF OR FALL ASLEEP WHEN YOU ARE A PASSENGER IN A CAR FOR AN HOUR WITHOUT A BREAK: WOULD NEVER DOZE

## 2025-09-03 PROCEDURE — RXMED WILLOW AMBULATORY MEDICATION CHARGE

## 2025-09-06 ENCOUNTER — PHARMACY VISIT (OUTPATIENT)
Dept: PHARMACY | Facility: CLINIC | Age: 55
End: 2025-09-06
Payer: COMMERCIAL

## 2025-09-15 ENCOUNTER — APPOINTMENT (OUTPATIENT)
Dept: PRIMARY CARE | Facility: CLINIC | Age: 55
End: 2025-09-15
Payer: COMMERCIAL

## 2025-09-29 ENCOUNTER — APPOINTMENT (OUTPATIENT)
Dept: PHARMACY | Facility: HOSPITAL | Age: 55
End: 2025-09-29
Payer: COMMERCIAL

## 2025-11-04 ENCOUNTER — APPOINTMENT (OUTPATIENT)
Dept: OBSTETRICS AND GYNECOLOGY | Facility: CLINIC | Age: 55
End: 2025-11-04
Payer: COMMERCIAL

## (undated) DEVICE — CANNULA NSL ORAL AD FOR CAPNOFLEX CO2 O2 AIRLFE

## (undated) DEVICE — CAESAR GRASPING FORCEPS: Brand: CAESAR